# Patient Record
Sex: FEMALE | Race: WHITE | NOT HISPANIC OR LATINO | Employment: OTHER | URBAN - METROPOLITAN AREA
[De-identification: names, ages, dates, MRNs, and addresses within clinical notes are randomized per-mention and may not be internally consistent; named-entity substitution may affect disease eponyms.]

---

## 2018-04-11 ENCOUNTER — PES CALL (OUTPATIENT)
Dept: ADMINISTRATIVE | Facility: CLINIC | Age: 73
End: 2018-04-11

## 2019-06-17 ENCOUNTER — HOSPITAL ENCOUNTER (OUTPATIENT)
Dept: TELEMEDICINE | Facility: HOSPITAL | Age: 74
Discharge: HOME | End: 2019-06-17
Payer: MEDICARE

## 2019-06-17 PROBLEM — F29 PSYCHOSIS: Status: ACTIVE | Noted: 2019-06-17

## 2019-06-17 PROCEDURE — 99203 OFFICE O/P NEW LOW 30 MIN: CPT | Mod: GT,,, | Performed by: PSYCHIATRY & NEUROLOGY

## 2019-06-17 PROCEDURE — 99203 PR OFFICE/OUTPT VISIT, NEW, LEVL III, 30-44 MIN: ICD-10-PCS | Mod: GT,,, | Performed by: PSYCHIATRY & NEUROLOGY
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
## 2019-06-18 PROBLEM — K21.9 GERD (GASTROESOPHAGEAL REFLUX DISEASE): Chronic | Status: ACTIVE | Noted: 2019-06-18

## 2019-06-18 PROBLEM — H10.13 ALLERGIC CONJUNCTIVITIS OF BOTH EYES: Status: ACTIVE | Noted: 2019-06-18

## 2019-06-18 PROBLEM — R73.09 ELEVATED GLUCOSE: Status: ACTIVE | Noted: 2019-06-18

## 2019-08-14 ENCOUNTER — PES CALL (OUTPATIENT)
Dept: ADMINISTRATIVE | Facility: CLINIC | Age: 74
End: 2019-08-14

## 2022-06-09 ENCOUNTER — TELEPHONE (OUTPATIENT)
Dept: UROLOGY | Facility: CLINIC | Age: 77
End: 2022-06-09

## 2022-06-09 ENCOUNTER — OFFICE VISIT (OUTPATIENT)
Dept: UROLOGY | Facility: CLINIC | Age: 77
End: 2022-06-09
Payer: MEDICARE

## 2022-06-09 VITALS
BODY MASS INDEX: 28.05 KG/M2 | HEART RATE: 96 BPM | WEIGHT: 152.44 LBS | DIASTOLIC BLOOD PRESSURE: 65 MMHG | RESPIRATION RATE: 18 BRPM | SYSTOLIC BLOOD PRESSURE: 103 MMHG | HEIGHT: 62 IN

## 2022-06-09 DIAGNOSIS — N39.0 URINARY TRACT INFECTION WITH HEMATURIA, SITE UNSPECIFIED: Primary | ICD-10-CM

## 2022-06-09 DIAGNOSIS — N20.0 LEFT NEPHROLITHIASIS: ICD-10-CM

## 2022-06-09 DIAGNOSIS — R31.9 URINARY TRACT INFECTION WITH HEMATURIA, SITE UNSPECIFIED: Primary | ICD-10-CM

## 2022-06-09 LAB
BILIRUB SERPL-MCNC: ABNORMAL MG/DL
BLOOD URINE, POC: ABNORMAL
CLARITY, POC UA: CLEAR
COLOR, POC UA: YELLOW
GLUCOSE UR QL STRIP: ABNORMAL
KETONES UR QL STRIP: ABNORMAL
LEUKOCYTE ESTERASE URINE, POC: ABNORMAL
NITRITE, POC UA: ABNORMAL
PH, POC UA: 5.5
PROTEIN, POC: ABNORMAL
SPECIFIC GRAVITY, POC UA: 1.02
UROBILINOGEN, POC UA: 0.2

## 2022-06-09 PROCEDURE — 99999 PR PBB SHADOW E&M-EST. PATIENT-LVL IV: CPT | Mod: PBBFAC,,, | Performed by: UROLOGY

## 2022-06-09 PROCEDURE — 3288F PR FALLS RISK ASSESSMENT DOCUMENTED: ICD-10-PCS | Mod: CPTII,S$GLB,, | Performed by: UROLOGY

## 2022-06-09 PROCEDURE — 99204 OFFICE O/P NEW MOD 45 MIN: CPT | Mod: S$GLB,,, | Performed by: UROLOGY

## 2022-06-09 PROCEDURE — 81002 POCT URINE DIPSTICK WITHOUT MICROSCOPE: ICD-10-PCS | Mod: S$GLB,,, | Performed by: UROLOGY

## 2022-06-09 PROCEDURE — 3074F PR MOST RECENT SYSTOLIC BLOOD PRESSURE < 130 MM HG: ICD-10-PCS | Mod: CPTII,S$GLB,, | Performed by: UROLOGY

## 2022-06-09 PROCEDURE — 1126F AMNT PAIN NOTED NONE PRSNT: CPT | Mod: CPTII,S$GLB,, | Performed by: UROLOGY

## 2022-06-09 PROCEDURE — 3078F PR MOST RECENT DIASTOLIC BLOOD PRESSURE < 80 MM HG: ICD-10-PCS | Mod: CPTII,S$GLB,, | Performed by: UROLOGY

## 2022-06-09 PROCEDURE — 1101F PR PT FALLS ASSESS DOC 0-1 FALLS W/OUT INJ PAST YR: ICD-10-PCS | Mod: CPTII,S$GLB,, | Performed by: UROLOGY

## 2022-06-09 PROCEDURE — 3074F SYST BP LT 130 MM HG: CPT | Mod: CPTII,S$GLB,, | Performed by: UROLOGY

## 2022-06-09 PROCEDURE — 3288F FALL RISK ASSESSMENT DOCD: CPT | Mod: CPTII,S$GLB,, | Performed by: UROLOGY

## 2022-06-09 PROCEDURE — 1101F PT FALLS ASSESS-DOCD LE1/YR: CPT | Mod: CPTII,S$GLB,, | Performed by: UROLOGY

## 2022-06-09 PROCEDURE — 1126F PR PAIN SEVERITY QUANTIFIED, NO PAIN PRESENT: ICD-10-PCS | Mod: CPTII,S$GLB,, | Performed by: UROLOGY

## 2022-06-09 PROCEDURE — 3078F DIAST BP <80 MM HG: CPT | Mod: CPTII,S$GLB,, | Performed by: UROLOGY

## 2022-06-09 PROCEDURE — 81002 URINALYSIS NONAUTO W/O SCOPE: CPT | Mod: S$GLB,,, | Performed by: UROLOGY

## 2022-06-09 PROCEDURE — 99204 PR OFFICE/OUTPT VISIT, NEW, LEVL IV, 45-59 MIN: ICD-10-PCS | Mod: S$GLB,,, | Performed by: UROLOGY

## 2022-06-09 PROCEDURE — 1159F PR MEDICATION LIST DOCUMENTED IN MEDICAL RECORD: ICD-10-PCS | Mod: CPTII,S$GLB,, | Performed by: UROLOGY

## 2022-06-09 PROCEDURE — 99999 PR PBB SHADOW E&M-EST. PATIENT-LVL IV: ICD-10-PCS | Mod: PBBFAC,,, | Performed by: UROLOGY

## 2022-06-09 PROCEDURE — 1159F MED LIST DOCD IN RCRD: CPT | Mod: CPTII,S$GLB,, | Performed by: UROLOGY

## 2022-06-09 RX ORDER — ESTRADIOL 0.1 MG/G
1 CREAM VAGINAL DAILY
Qty: 42.5 G | Refills: 3 | Status: SHIPPED | OUTPATIENT
Start: 2022-06-09 | End: 2022-06-09

## 2022-06-09 RX ORDER — ESTRADIOL 0.1 MG/G
1 CREAM VAGINAL DAILY
Qty: 42.5 G | Refills: 3 | Status: SHIPPED | OUTPATIENT
Start: 2022-06-09 | End: 2023-11-20

## 2022-06-09 NOTE — PATIENT INSTRUCTIONS
Need to confirm uti's in future with cath urines. If no uti then eval cause for sx.   LLP 9mm stone enlarging slowly since 2013    1. Urinary tract infection with hematuria, site unspecified    2. Left nephrolithiasis          Plan:     Start estrace cream nightly for 2 weeks then every other night- to help prevent uti.     Start utiva cranberry caplets. Once daily. When she has uti sx twice a day for 2 days. If no improvement with then needs catheterized urien sample     Xray and ultrasound  to see if stone still present and visible, confirm not obstructing-if visible on kub/xray can possibly can shock wave lithotripsy (12cm SSD, 1200 HU). Would likely need stent followed by urs if didn't work.     She will call clinic if she has uti's - needs catheterized urine samples     F/u in 3 months        Hormone cream- why you need it, how to use it  We all have bacteria that cover our bodies, however we want good bacteria, not bad bacteria. When you lack hormones,  your vagina starts to let the bad bacteria take over because there is an imbalance. The hormone cream allows for good bacteria to take over again and this can help decrease the risks of UTIs. It can also help with vaginal dryness.   Please let  know if you have a history of breast cancer, uterine cancer, cervical cancer or a history of blood clots or heart attack.  If it costs more than $70 we can write you for a compounded less expensive form of estrace [facility]. Please let us know. They will mail it to you for around $8 or you can pick it up in [place]. They will call you for payment first and you can ask the address. If you haven't heard from them in 2 days, please call them to confirm they received the prescription.   Do not use the applicator, just your finger. This will make it last longer. Apply small smear to 2nd knuckle. Apply around urethra and into vagina to 2nd knuckle. Prescription should last around 6 months.    Apply inside vagina with finger daily x 2 weeks and then 2 to 3x per week after this indefinitely.  You will only see improvement if you use on a regular basis in about a month. (less dryness, maybe some less overactive bladder, less UTIs possibly if having UTIs).   It is not a medicine to use as needed. Typically a lifelong medication.

## 2022-06-09 NOTE — TELEPHONE ENCOUNTER
Spoke with aliza at [facility] clarified prescription for estrace vaginal cream. Apply pea size amount with knuckle. Verbally voiced understanding.

## 2022-06-09 NOTE — TELEPHONE ENCOUNTER
----- Message from Elaina Edouard MA sent at 6/9/2022 12:49 PM CDT -----  Type: Needs Medical Advice  Who Called:  [facility]  Best Call Back Number: [phone]  Additional Information: pharm needs clarification for medication that was sent over

## 2022-06-09 NOTE — PROGRESS NOTES
[facility] - [place]  Staff: MD Maritza  PCP: Kareem Sosa MD  Date of Service: 06/09/2022      Subjective:     HPI: Amanda Pereira is a 76 y.o. female     Pt saw radha sherrie 12/5/12 for flank pain, oab and possible h/o stones. Urine cultures were negative. and by that point had a CTRSS 11/29/12 showing a 5mm stone on left      She then saw  2/8/13 . He did a cysto urethral dilation. kub showed 8mm stone over L kidney.       She saw Brea rodriguez 10/31/14 for L flank pain with nausea. Voided ua showed leuk and blood.     kub 10/23/14 identifies a stone but not in location c/w seen on ct and previous kub      She saw me 12/10/14 with c/o L flank pain stating pain occurs with activity and decaf coffee, was also having some UUI. pvr by scan:0. ua neg. I ordered a rbus and started her on vesicare and premarin cream. rbus showed 5mm stone in L kd without hydro.     Ct 2018 at [facility] done for diarrhea:      Interval history since last visit with me 6/9/22 :  She thinks she's been having uti's the past few months. Symptoms include dysuria, significant urge incontinence, peach colored cloudiness. Hasn't passed any kidney stones. Hasn't had any left flank pain.  Usually resolves with azo. Denies any GH. Significant dry mouth from anti-depressants?    About a week ago she saw pcp, she was started on abx and finished them yesterday.     When she doesn't have a uti has some oab. Voids 3-4x a day.    Urine history: family history of kidney, bladder or prostate cancer:No, personal or family history of kidney stones: No,tobacco use: No, anticoagulation: No   6/9/22  Tr bld  12/10/14 Neg  10/27/14 2+bili/+wbc/50 bld  10/23/14 diptheroids  11/29/12 Ng, void: >1000 glucose  11/21/12 Tr prot    REVIEW OF SYSTEMS:  Negative except for as stated above    Past Medical History:   Diagnosis Date    Depression     Diabetes mellitus     borderline    Kidney stone        Past Surgical History:    Procedure Laterality Date    TONSILLECTOMY, ADENOIDECTOMY      TUBAL LIGATION      WISDOM TOOTH EXTRACTION              Objective:     Vitals:    06/09/22 1204   BP: 103/65   Pulse: 96   Resp: 18       Focused Gu exam: as above       Assessment:     Amanda Pereira is a 76 y.o. female with     Need to confirm uti's in future with cath urines. If no uti then eval cause for sx.   LLP 9mm stone enlarging slowly since 2013    1. Urinary tract infection with hematuria, site unspecified    2. Left nephrolithiasis          Plan:     Start estrace cream nightly for 2 weeks then every other night- to help prevent uti. [facility] to [facility]     Start utiva cranberry caplets. Once daily. When she has uti sx twice a day for 2 days. If no improvement with then needs catheterized urien sample     Xray and ultrasound  to see if stone still present and visible, confirm not obstructing-if visible on kub/xray can possibly can shock wave lithotripsy (12cm SSD, 1200 HU). Would likely need stent followed by urs if didn't work.     She will call clinic if she has uti's - needs catheterized urine samples     F/u in 3 months          Pepper Salas MD

## 2022-06-10 ENCOUNTER — PATIENT MESSAGE (OUTPATIENT)
Dept: UROLOGY | Facility: CLINIC | Age: 77
End: 2022-06-10
Payer: MEDICARE

## 2022-06-13 ENCOUNTER — PATIENT MESSAGE (OUTPATIENT)
Dept: UROLOGY | Facility: CLINIC | Age: 77
End: 2022-06-13
Payer: MEDICARE

## 2022-06-14 ENCOUNTER — PATIENT MESSAGE (OUTPATIENT)
Dept: UROLOGY | Facility: CLINIC | Age: 77
End: 2022-06-14
Payer: MEDICARE

## 2022-06-27 ENCOUNTER — HOSPITAL ENCOUNTER (OUTPATIENT)
Dept: RADIOLOGY | Facility: HOSPITAL | Age: 77
Discharge: HOME | End: 2022-06-27
Attending: UROLOGY
Payer: MEDICARE

## 2022-06-27 DIAGNOSIS — N20.0 LEFT NEPHROLITHIASIS: ICD-10-CM

## 2022-06-27 DIAGNOSIS — R31.9 URINARY TRACT INFECTION WITH HEMATURIA, SITE UNSPECIFIED: ICD-10-CM

## 2022-06-27 DIAGNOSIS — N39.0 URINARY TRACT INFECTION WITH HEMATURIA, SITE UNSPECIFIED: ICD-10-CM

## 2022-06-27 PROCEDURE — 74018 RADEX ABDOMEN 1 VIEW: CPT | Mod: 26,,, | Performed by: RADIOLOGY

## 2022-06-27 PROCEDURE — 74018 RADEX ABDOMEN 1 VIEW: CPT | Mod: TC,FY

## 2022-06-27 PROCEDURE — 76770 US EXAM ABDO BACK WALL COMP: CPT | Mod: 26,,, | Performed by: RADIOLOGY

## 2022-06-27 PROCEDURE — 76770 US EXAM ABDO BACK WALL COMP: CPT | Mod: TC

## 2022-06-27 PROCEDURE — 74018 XR KUB: ICD-10-PCS | Mod: 26,,, | Performed by: RADIOLOGY

## 2022-06-27 PROCEDURE — 76770 US RETROPERITONEAL COMPLETE: ICD-10-PCS | Mod: 26,,, | Performed by: RADIOLOGY

## 2022-06-27 NOTE — PROGRESS NOTES
Let her know xray shows stone  It's not obstructing  It should not be causing pain  We can talk about treating it since we can see it on xray  She can f/u in 3 months as scheduled or f/u sooner if she wants to discuss shock wave treatment for stone

## 2022-08-22 ENCOUNTER — OFFICE VISIT (OUTPATIENT)
Dept: UROLOGY | Facility: CLINIC | Age: 77
End: 2022-08-22
Payer: MEDICARE

## 2022-08-22 VITALS
WEIGHT: 143.88 LBS | BODY MASS INDEX: 25.49 KG/M2 | HEIGHT: 63 IN | HEART RATE: 80 BPM | DIASTOLIC BLOOD PRESSURE: 61 MMHG | SYSTOLIC BLOOD PRESSURE: 105 MMHG

## 2022-08-22 DIAGNOSIS — N39.41 URGE INCONTINENCE: ICD-10-CM

## 2022-08-22 DIAGNOSIS — N32.81 OAB (OVERACTIVE BLADDER): ICD-10-CM

## 2022-08-22 DIAGNOSIS — N20.0 LEFT NEPHROLITHIASIS: Primary | ICD-10-CM

## 2022-08-22 LAB
BILIRUB SERPL-MCNC: ABNORMAL MG/DL
BLOOD URINE, POC: NEGATIVE
CLARITY, POC UA: CLEAR
COLOR, POC UA: YELLOW
GLUCOSE UR QL STRIP: NEGATIVE
KETONES UR QL STRIP: NEGATIVE
LEUKOCYTE ESTERASE URINE, POC: ABNORMAL
NITRITE, POC UA: NEGATIVE
PH, POC UA: 5.5
PROTEIN, POC: NEGATIVE
SPECIFIC GRAVITY, POC UA: 1.02
UROBILINOGEN, POC UA: 0.2

## 2022-08-22 PROCEDURE — 99999 PR PBB SHADOW E&M-EST. PATIENT-LVL V: CPT | Mod: PBBFAC,,, | Performed by: UROLOGY

## 2022-08-22 PROCEDURE — 81002 POCT URINE DIPSTICK WITHOUT MICROSCOPE: ICD-10-PCS | Mod: S$GLB,,, | Performed by: UROLOGY

## 2022-08-22 PROCEDURE — 99214 OFFICE O/P EST MOD 30 MIN: CPT | Mod: S$GLB,,, | Performed by: UROLOGY

## 2022-08-22 PROCEDURE — 3074F PR MOST RECENT SYSTOLIC BLOOD PRESSURE < 130 MM HG: ICD-10-PCS | Mod: CPTII,S$GLB,, | Performed by: UROLOGY

## 2022-08-22 PROCEDURE — 3078F PR MOST RECENT DIASTOLIC BLOOD PRESSURE < 80 MM HG: ICD-10-PCS | Mod: CPTII,S$GLB,, | Performed by: UROLOGY

## 2022-08-22 PROCEDURE — 1160F RVW MEDS BY RX/DR IN RCRD: CPT | Mod: CPTII,S$GLB,, | Performed by: UROLOGY

## 2022-08-22 PROCEDURE — 1101F PR PT FALLS ASSESS DOC 0-1 FALLS W/OUT INJ PAST YR: ICD-10-PCS | Mod: CPTII,S$GLB,, | Performed by: UROLOGY

## 2022-08-22 PROCEDURE — 1160F PR REVIEW ALL MEDS BY PRESCRIBER/CLIN PHARMACIST DOCUMENTED: ICD-10-PCS | Mod: CPTII,S$GLB,, | Performed by: UROLOGY

## 2022-08-22 PROCEDURE — 99214 PR OFFICE/OUTPT VISIT, EST, LEVL IV, 30-39 MIN: ICD-10-PCS | Mod: S$GLB,,, | Performed by: UROLOGY

## 2022-08-22 PROCEDURE — 3074F SYST BP LT 130 MM HG: CPT | Mod: CPTII,S$GLB,, | Performed by: UROLOGY

## 2022-08-22 PROCEDURE — 3288F FALL RISK ASSESSMENT DOCD: CPT | Mod: CPTII,S$GLB,, | Performed by: UROLOGY

## 2022-08-22 PROCEDURE — 3288F PR FALLS RISK ASSESSMENT DOCUMENTED: ICD-10-PCS | Mod: CPTII,S$GLB,, | Performed by: UROLOGY

## 2022-08-22 PROCEDURE — 3078F DIAST BP <80 MM HG: CPT | Mod: CPTII,S$GLB,, | Performed by: UROLOGY

## 2022-08-22 PROCEDURE — 1159F PR MEDICATION LIST DOCUMENTED IN MEDICAL RECORD: ICD-10-PCS | Mod: CPTII,S$GLB,, | Performed by: UROLOGY

## 2022-08-22 PROCEDURE — 99999 PR PBB SHADOW E&M-EST. PATIENT-LVL V: ICD-10-PCS | Mod: PBBFAC,,, | Performed by: UROLOGY

## 2022-08-22 PROCEDURE — 1101F PT FALLS ASSESS-DOCD LE1/YR: CPT | Mod: CPTII,S$GLB,, | Performed by: UROLOGY

## 2022-08-22 PROCEDURE — 1126F AMNT PAIN NOTED NONE PRSNT: CPT | Mod: CPTII,S$GLB,, | Performed by: UROLOGY

## 2022-08-22 PROCEDURE — 1159F MED LIST DOCD IN RCRD: CPT | Mod: CPTII,S$GLB,, | Performed by: UROLOGY

## 2022-08-22 PROCEDURE — 81002 URINALYSIS NONAUTO W/O SCOPE: CPT | Mod: S$GLB,,, | Performed by: UROLOGY

## 2022-08-22 PROCEDURE — 1126F PR PAIN SEVERITY QUANTIFIED, NO PAIN PRESENT: ICD-10-PCS | Mod: CPTII,S$GLB,, | Performed by: UROLOGY

## 2022-08-22 RX ORDER — OXYBUTYNIN CHLORIDE 10 MG/1
10 TABLET, EXTENDED RELEASE ORAL DAILY
Qty: 90 TABLET | Refills: 3 | Status: ON HOLD | OUTPATIENT
Start: 2022-08-22 | End: 2022-09-21 | Stop reason: HOSPADM

## 2022-08-22 RX ORDER — LABETALOL 200 MG/1
200 TABLET, FILM COATED ORAL NIGHTLY
Status: ON HOLD | COMMUNITY
Start: 2022-05-18 | End: 2023-11-22 | Stop reason: HOSPADM

## 2022-08-22 RX ORDER — ESCITALOPRAM OXALATE 20 MG/1
20 TABLET ORAL EVERY MORNING
COMMUNITY
Start: 2022-04-25

## 2022-08-22 RX ORDER — DOXEPIN HYDROCHLORIDE 25 MG/1
CAPSULE ORAL
Status: ON HOLD | COMMUNITY
Start: 2022-05-18 | End: 2022-09-21 | Stop reason: ALTCHOICE

## 2022-08-22 RX ORDER — LAMOTRIGINE 150 MG/1
150 TABLET ORAL 2 TIMES DAILY
COMMUNITY
Start: 2022-05-09

## 2022-08-22 RX ORDER — CIPROFLOXACIN 2 MG/ML
400 INJECTION, SOLUTION INTRAVENOUS
Status: CANCELLED | OUTPATIENT
Start: 2022-08-22

## 2022-08-22 NOTE — PATIENT INSTRUCTIONS
Assessment:     Amanda Pereira is a 76 y.o. female with     Need to confirm uti's in future with cath urines. If no uti then eval cause for sx.      LLP 9mm stone enlarging slowly since 2013. Visible on kub. No ct since 2018 but bw 2014 and 2018 did not grow any new stones. Wants to try to have a procedure can try eswl. Stent and urs if it doesn't break up.    oab- try ditropan      1. Left nephrolithiasis    2. OAB (overactive bladder)    3. Urge incontinence          Plan:       Short plan:  Continue estrace cream every other night at minimum 2x a week to help prevent uti's.  Start ditropan/oxybutynin 10mg XL once in the morning to see if it helps with frequency and urine leakage. If it causes too much dry mouth let us know and we can try another medicine. But the other medicines will take longer to work.  Schedule for ESWL/shock wave of LLP stone on Wednesday sept 21st.   preop visit 7d prior with labs, cxr, ekg.   Hold any asa products x 7d. kub morning of procedure.   Catheterized urine 10d prior for ua and culture.       Extracorporeal shock wave lithotripsy is planned for left lower pole 9mm STONE on 9/21:  The stone is radioopaque.   The stone is located on the left at the level of L3 and measures 10 mm.   CT showed the stone was 1200 [place] units and 12 cm skin to stone distance.     The patient does not have more stones in right kidney. (but last ct in 2018 neg. rbus recently neg.   The patient was counseled that if the stone does not appear to break up that I can place a stent and return for stone extraction 2-3 weeks later.   The patient was counseled to stop any blood thinners including Goody's Powder, BC Powder 1 week prior the procedure.     The patient is scheduled for ESWL. The risks and benefits of extracorporeal shock wave lithotripsy were discussed with the patient in detail.  Consent was obtained.  The risks include but are not limited to bleeding in or around the kidney, infection, pain,  incomplete fragmentation of the stone requiring a repeat treatment or a different form of treatment, obstruction of the kidney by stone particles possibly requiring a ureteral stent or nephrostomy tube, injury to or loss of the kidney ,injury to the ureter or bladder, need for further procedures, hypertension (transient or permanent), recurrence of stones, and need for open surgery.  Alternative treatments were also discussed with the patient in detail to include ureteroscopy, percutaneous treatment of the stone, open surgery  and observation. Patient understands these risks and has agreed to proceed with surgery.

## 2022-08-22 NOTE — PROGRESS NOTES
[facility] - [place]  Staff: MD Maritza  PCP: Kareem Sosa MD  Date of Service: 08/22/2022      Subjective:     HPI: Amanda Pereira is a 76 y.o. female     Pt saw radha sherrie 12/5/12 for flank pain, oab and possible h/o stones. Urine cultures were negative. and by that point had a CTRSS 11/29/12 showing a 5mm stone on left      She then saw  2/8/13 . He did a cysto urethral dilation. kub showed 8mm stone over L kidney.       She saw Brea rodriguez 10/31/14 for L flank pain with nausea. Voided ua showed leuk and blood.     kub 10/23/14 identifies a stone but not in location c/w seen on ct and previous kub      12/10/14 interval history by me: She saw me 12/10/14 with c/o L flank pain stating pain occurs with activity and decaf coffee, was also having some UUI. Pt states that she had left flank pain for the past few years ever since her kidney stone was first identified. She was going through a lot with her  going through cancer treatment and eventually passing away. She says she has pain whenever she drinks decaf coffee, does housework and describes the pain as a throbbing pain. Her known stone is a left nonobstructing stone.      She also describes urgency and urge incontinence occassionally. Last week she wet all her clothes because she was unable to control it. She's been having trouble controlling her urine for the past few weeks. She also describes leaking urine every time she coughs. She does not wear any pads. She does not have to change her underwear every day. Her UUI bothers her the most.  She also describes difficulty voiding in the morning. Stream improves throughout the day.    pvr by scan:0. ua neg.   I ordered a rbus and started her on vesicare and premarin cream. rbus showed 5mm stone in L kd without hydro.     Ct 2018 at [facility] done for diarrhea:LLP stone only. No new stones.         Interval history 6/9/22 :  She thinks she's been having uti's the  past few months. Symptoms include dysuria, significant urge incontinence, peach colored cloudiness. Hasn't passed any kidney stones. Hasn't had any left flank pain.  Usually resolves with azo. Denies any GH. Significant dry mouth from anti-depressants?    About a week ago she saw pcp, she was started on abx and finished them yesterday.     When she doesn't have a uti has some oab. Voids 3-4x a day. ua today with tr bld.     Interval history 8/22/22:  At last visit recommended starting estrace cream and utiva to help prevent uti/uti sx. She's using hormone cream every other night but not utiva. hasn not had a uti in the last 2.5 months.      Also ordered a kub which showed stone in LLP. No other obvious stones. rbus showed no hydro. No mention of stones bc bowel obscuring LLQ. . ctrss previously showed:  (12cm SSD, 1200 HU).     She also says she is urinating every 1-2 hours (day and night). Wearing 2-3 pads/day- wet when she changes them.      ua void: small leuk but cath urine with none.         Urine history: family history of kidney, bladder or prostate cancer:No, personal or family history of kidney stones: No,tobacco use: No, anticoagulation: No. No cardiologist. NEEDS CATHETERIZED URINES.   8/22/22 Small leuk, cath: tr bld, pvr by io:   6/9/22  Tr bld   12/10/14 Neg  10/27/14 2+bili/+wbc/50 bld  10/23/14 diptheroids  11/29/12 Ng, void: >1000 glucose  11/21/12 Tr prot    REVIEW OF SYSTEMS:  Negative except for as stated above    Past Medical History:   Diagnosis Date    Depression     Diabetes mellitus     borderline    Kidney stone        Past Surgical History:   Procedure Laterality Date    TONSILLECTOMY, ADENOIDECTOMY      TUBAL LIGATION      WISDOM TOOTH EXTRACTION              Objective:     Vitals:    08/22/22 1156   BP: 105/61   Pulse: 80       Focused Gu exam: as above       Assessment:     Amanda Pereira is a 76 y.o. female with     Need to confirm uti's in future with cath urines. If no uti then  eval cause for sx.      LLP 9mm stone enlarging slowly since 2013. Visible on kub. No ct since 2018 but bw 2014 and 2018 did not grow any new stones. Wants to try to have a procedure can try eswl. Stent and urs if it doesn't break up.    oab- try ditropan      1. Left nephrolithiasis    2. OAB (overactive bladder)    3. Urge incontinence          Plan:       Short plan:  · Continue estrace cream every other night at minimum 2x a week to help prevent uti's.  · Start ditropan/oxybutynin 10mg XL once in the morning to see if it helps with frequency and urine leakage. If it causes too much dry mouth let us know and we can try another medicine. But the other medicines will take longer to work.  · Schedule for ESWL/shock wave of LLP stone on Wednesday sept 21st.   · preop visit 7d prior with labs, cxr, ekg.   · Hold any asa products x 7d. kub morning of procedure.   · Catheterized urine 10d prior for ua and culture.       Extracorporeal shock wave lithotripsy is planned for left lower pole 9mm STONE on 9/21:  The stone is radioopaque.   The stone is located on the left at the level of L3 and measures 10 mm.   CT showed the stone was 1200 [place] units and 12 cm skin to stone distance.     The patient does not have more stones in right kidney. (but last ct in 2018 neg. rbus recently neg.   The patient was counseled that if the stone does not appear to break up that I can place a stent and return for stone extraction 2-3 weeks later.   The patient was counseled to stop any blood thinners including Goody's Powder, BC Powder 1 week prior the procedure.     The patient is scheduled for ESWL. The risks and benefits of extracorporeal shock wave lithotripsy were discussed with the patient in detail.  Consent was obtained.  The risks include but are not limited to bleeding in or around the kidney, infection, pain, incomplete fragmentation of the stone requiring a repeat treatment or a different form of treatment, obstruction  of the kidney by stone particles possibly requiring a ureteral stent or nephrostomy tube, injury to or loss of the kidney ,injury to the ureter or bladder, need for further procedures, hypertension (transient or permanent), recurrence of stones, and need for open surgery.  Alternative treatments were also discussed with the patient in detail to include ureteroscopy, percutaneous treatment of the stone, open surgery  and observation. Patient understands these risks and has agreed to proceed with surgery.          Pepper Salas MD

## 2022-09-08 ENCOUNTER — TELEPHONE (OUTPATIENT)
Dept: UROLOGY | Facility: CLINIC | Age: 77
End: 2022-09-08
Payer: MEDICARE

## 2022-09-13 ENCOUNTER — CLINICAL SUPPORT (OUTPATIENT)
Dept: UROLOGY | Facility: CLINIC | Age: 77
End: 2022-09-13
Payer: MEDICARE

## 2022-09-13 ENCOUNTER — HOSPITAL ENCOUNTER (OUTPATIENT)
Dept: RADIOLOGY | Facility: HOSPITAL | Age: 77
Discharge: HOME | End: 2022-09-13
Attending: UROLOGY
Payer: MEDICARE

## 2022-09-13 ENCOUNTER — HOSPITAL ENCOUNTER (OUTPATIENT)
Dept: PREADMISSION TESTING | Facility: HOSPITAL | Age: 77
Discharge: HOME | End: 2022-09-13
Attending: UROLOGY
Payer: MEDICARE

## 2022-09-13 DIAGNOSIS — Z01.818 PRE-OP EXAM: ICD-10-CM

## 2022-09-13 DIAGNOSIS — R82.998 CELLS AND CASTS IN URINE: Primary | ICD-10-CM

## 2022-09-13 DIAGNOSIS — N20.0 LEFT NEPHROLITHIASIS: ICD-10-CM

## 2022-09-13 LAB
BILIRUB UR QL STRIP: NEGATIVE
CLARITY UR: CLEAR
COLOR UR: YELLOW
GLUCOSE UR QL STRIP: NEGATIVE
HGB UR QL STRIP: ABNORMAL
KETONES UR QL STRIP: NEGATIVE
LEUKOCYTE ESTERASE UR QL STRIP: NEGATIVE
NITRITE UR QL STRIP: NEGATIVE
PH UR STRIP: 7 [PH] (ref 5–8)
PROT UR QL STRIP: NEGATIVE
SP GR UR STRIP: <=1.005 (ref 1–1.03)
URN SPEC COLLECT METH UR: ABNORMAL
UROBILINOGEN UR STRIP-ACNC: NEGATIVE EU/DL

## 2022-09-13 PROCEDURE — 99499 NO LOS: ICD-10-PCS | Mod: S$GLB,,, | Performed by: UROLOGY

## 2022-09-13 PROCEDURE — 87086 URINE CULTURE/COLONY COUNT: CPT | Performed by: UROLOGY

## 2022-09-13 PROCEDURE — 71045 XR CHEST AP PORTABLE: ICD-10-PCS | Mod: 26,,, | Performed by: RADIOLOGY

## 2022-09-13 PROCEDURE — 71045 X-RAY EXAM CHEST 1 VIEW: CPT | Mod: 26,,, | Performed by: RADIOLOGY

## 2022-09-13 PROCEDURE — 71045 X-RAY EXAM CHEST 1 VIEW: CPT | Mod: TC,FY

## 2022-09-13 PROCEDURE — 81003 URINALYSIS AUTO W/O SCOPE: CPT | Performed by: UROLOGY

## 2022-09-13 PROCEDURE — 99499 UNLISTED E&M SERVICE: CPT | Mod: S$GLB,,, | Performed by: UROLOGY

## 2022-09-13 PROCEDURE — 51701 PR INSERTION OF NON-INDWELLING BLADDER CATHETERIZATION FOR RESIDUAL UR: ICD-10-PCS | Mod: S$GLB,,, | Performed by: UROLOGY

## 2022-09-13 PROCEDURE — 51701 INSERT BLADDER CATHETER: CPT | Mod: S$GLB,,, | Performed by: UROLOGY

## 2022-09-13 PROCEDURE — 93005 ELECTROCARDIOGRAM TRACING: CPT

## 2022-09-13 PROCEDURE — 93010 EKG 12-LEAD: ICD-10-PCS | Mod: ,,, | Performed by: INTERNAL MEDICINE

## 2022-09-13 PROCEDURE — 93010 ELECTROCARDIOGRAM REPORT: CPT | Mod: ,,, | Performed by: INTERNAL MEDICINE

## 2022-09-13 RX ORDER — MAG HYDROX/ALUMINUM HYD/SIMETH 200-200-20
30 SUSPENSION, ORAL (FINAL DOSE FORM) ORAL
COMMUNITY
End: 2023-11-20

## 2022-09-13 NOTE — DISCHARGE INSTRUCTIONS
To confirm, Your doctor has instructed you that surgery is scheduled for: 9/21/22 WITH DR. WAYNE    Please report to [facility], [facility] the morning of surgery. You must check-in and receive a wristband before going to your procedure.    Pre-Op will call the afternoon prior to surgery between 1:00 and 6:00 PM with the final arrival time.  Phone number: [phone]    PLEASE NOTE:  The surgery schedule has many variables which may affect the time of your surgery case.  Family members should be available if your surgery time changes.  Plan to be here the day of your procedure between 4-6 hours.    MEDICATIONS:  TAKE ONLY THESE MEDICATIONS WITH A SMALL SIP OF WATER THE MORNING OF YOUR PROCEDURE:  LEXAPRO, LAMICTAL, OXYBUTYNIN    DO NOT TAKE THESE MEDICATIONS 5-7 DAYS PRIOR to your procedure or per your surgeon's request:   ASPIRIN, ALEVE, ADVIL, IBUPROFEN, FISH OIL VITAMIN E, HERBALS  (May take Tylenol)    ONLY if you are prescribed any types of blood thinners such as:  Aspirin, Coumadin, Plavix, Pradaxa, Xarelto, Aggrenox, Effient, Eliquis, Savasya, Brilinta, or any other, ask your surgeon whether you should stop taking them and how long before surgery you should stop.  You may also need to verify with the prescribing physician if it is ok to stop your medication.      INSTRUCTIONS IMPORTANT!!  Do not eat or drink anything between midnight and the time of your procedure- this includes gum, mints, and candy.  Do not smoke or drink alcoholic beverages 24 hours prior to your procedure.  Shower the night before AND the morning of your procedure with a Chlorhexidine wash such as Hibiclens or Dial antibacterial soap from the neck down.  Do not get it on your face or in your eyes.  You may use your own shampoo and face wash. This helps your skin to be as bacteria free as possible.    If you wear contact lenses, dentures, hearing aids or glasses, bring a container to put them in during  surgery and give to a family member for safe keeping.  Please leave all jewelry, piercing's and valuables at home.   DO NOT remove hair from the surgery site.  Do not shave the incision site unless you are given specific instructions to do so.    ONLY if you have been diagnosed with sleep apnea please bring your C-PAP machine.  ONLY if you wear home oxygen please bring your portable oxygen tank the day of your procedure.  ONLY if you have a history of OPEN HEART SURGERY you will need a clearance from your Cardiologist per Anesthesia.      ONLY for patients requiring bowel prep, written instructions will be given by your doctor's office.  ONLY if you have a neuro stimulator, please bring the controller with you the morning of surgery  ONLY if a type and screen test is needed before surgery, please return:  If your doctor has scheduled you for an overnight stay, bring a small overnight bag with any personal items you need.  Make arrangements in advance for transportation home by a responsible adult.  It is not safe to drive a vehicle during the 24 hours after anesthesia.       [facility] Visitor Policy  Effective July 25, 2022    [facility] will make masks available at entrances and will enforce mask wearing in all common and patient  care areas for employees, patients, and visitors. Masks must be worn properly, ensuring that the nose and mouth  are covered. Children under 2 years of age are excluded from this requirement.    [facility] will continue routine visitation for COVID-19 negative patients, including inpatients, outpatients, and  procedural areas. Visitors will be asked to leave if they exhibit symptoms of respiratory infection, have tested  positive for COVID -19 in the last ten days, have a pending COVID-19 test for symptoms, are unable or refuse  to wear a mask OR do not comply with current policy    All [facility] facilities and properties are tobacco free.  Smoking is NOT allowed.   If you have any  questions about these instructions, call Pre-Op Admit  Nursing at [phone] or the Pre-Op Day Surgery Unit at [phone].

## 2022-09-13 NOTE — PROGRESS NOTES
The patient is here today for a catheterized urine sample.   The patient does not have UTI symptoms (burning with urination, frequency, urgency)    The patient was prepped and a sterile in and out catheterization was performed using a 10 fr catheter.   The bladder was completely drained with the catheter.   40 cc drained when catheter was placed.   Was the patient asked to empty their bladder prior to obtaining a catheterized specimen? no     The urine was sent for: u/a and urine culture    Plan copied from last visit or results prompting today's cath urine (date of note: 8/22/2022):    Short plan:  Continue estrace cream every other night at minimum 2x a week to help prevent uti's.  Start ditropan/oxybutynin 10mg XL once in the morning to see if it helps with frequency and urine leakage. If it causes too much dry mouth let us know and we can try another medicine. But the other medicines will take longer to work.  Schedule for ESWL/shock wave of LLP stone on Wednesday sept 21st.   preop visit 7d prior with labs, cxr, ekg.   Hold any asa products x 7d. kub morning of procedure.   Catheterized urine 10d prior for ua and culture.

## 2022-09-15 LAB — BACTERIA UR CULT: NO GROWTH

## 2022-09-20 ENCOUNTER — ANESTHESIA EVENT (OUTPATIENT)
Dept: SURGERY | Facility: HOSPITAL | Age: 77
End: 2022-09-20
Payer: MEDICARE

## 2022-09-21 ENCOUNTER — TELEPHONE (OUTPATIENT)
Dept: UROLOGY | Facility: CLINIC | Age: 77
End: 2022-09-21
Payer: MEDICARE

## 2022-09-21 ENCOUNTER — ANESTHESIA (OUTPATIENT)
Dept: SURGERY | Facility: HOSPITAL | Age: 77
End: 2022-09-21
Payer: MEDICARE

## 2022-09-21 ENCOUNTER — HOSPITAL ENCOUNTER (OUTPATIENT)
Facility: HOSPITAL | Age: 77
Discharge: HOME | End: 2022-09-21
Attending: UROLOGY | Admitting: UROLOGY
Payer: MEDICARE

## 2022-09-21 VITALS
SYSTOLIC BLOOD PRESSURE: 125 MMHG | WEIGHT: 150 LBS | TEMPERATURE: 98 F | DIASTOLIC BLOOD PRESSURE: 61 MMHG | OXYGEN SATURATION: 95 % | HEIGHT: 63 IN | BODY MASS INDEX: 26.58 KG/M2 | RESPIRATION RATE: 18 BRPM | HEART RATE: 54 BPM

## 2022-09-21 DIAGNOSIS — N20.0 LEFT NEPHROLITHIASIS: ICD-10-CM

## 2022-09-21 LAB
CTP QC/QA: YES
SARS-COV-2 AG RESP QL IA.RAPID: NEGATIVE

## 2022-09-21 PROCEDURE — 74420 UROGRAPHY RTRGR +-KUB: CPT | Mod: 26,,, | Performed by: UROLOGY

## 2022-09-21 PROCEDURE — C1758 CATHETER, URETERAL: HCPCS | Performed by: UROLOGY

## 2022-09-21 PROCEDURE — C1769 GUIDE WIRE: HCPCS | Performed by: UROLOGY

## 2022-09-21 PROCEDURE — 63600175 PHARM REV CODE 636 W HCPCS: Performed by: UROLOGY

## 2022-09-21 PROCEDURE — 71000015 HC POSTOP RECOV 1ST HR: Performed by: UROLOGY

## 2022-09-21 PROCEDURE — 25500020 PHARM REV CODE 255: Performed by: UROLOGY

## 2022-09-21 PROCEDURE — 71000033 HC RECOVERY, INTIAL HOUR: Performed by: UROLOGY

## 2022-09-21 PROCEDURE — D9220A PRA ANESTHESIA: ICD-10-PCS | Mod: CRNA,,, | Performed by: NURSE ANESTHETIST, CERTIFIED REGISTERED

## 2022-09-21 PROCEDURE — 63600175 PHARM REV CODE 636 W HCPCS: Performed by: NURSE ANESTHETIST, CERTIFIED REGISTERED

## 2022-09-21 PROCEDURE — 27200651 HC AIRWAY, LMA: Performed by: ANESTHESIOLOGY

## 2022-09-21 PROCEDURE — D9220A PRA ANESTHESIA: Mod: ANES,,, | Performed by: ANESTHESIOLOGY

## 2022-09-21 PROCEDURE — 36000706: Performed by: UROLOGY

## 2022-09-21 PROCEDURE — D9220A PRA ANESTHESIA: Mod: CRNA,,, | Performed by: NURSE ANESTHETIST, CERTIFIED REGISTERED

## 2022-09-21 PROCEDURE — 25000003 PHARM REV CODE 250: Performed by: NURSE ANESTHETIST, CERTIFIED REGISTERED

## 2022-09-21 PROCEDURE — 37000008 HC ANESTHESIA 1ST 15 MINUTES: Performed by: UROLOGY

## 2022-09-21 PROCEDURE — 25000003 PHARM REV CODE 250: Performed by: ANESTHESIOLOGY

## 2022-09-21 PROCEDURE — 99900103 DSU ONLY-NO CHARGE-INITIAL HR (STAT): Performed by: UROLOGY

## 2022-09-21 PROCEDURE — 74420 PR  X-RAY RETROGRADE PYELOGRAM: ICD-10-PCS | Mod: 26,,, | Performed by: UROLOGY

## 2022-09-21 PROCEDURE — 94640 AIRWAY INHALATION TREATMENT: CPT

## 2022-09-21 PROCEDURE — 36000707: Performed by: UROLOGY

## 2022-09-21 PROCEDURE — 50590 PR FRAGMENT KIDNEY STONE/ ESWL: ICD-10-PCS | Mod: LT,,, | Performed by: UROLOGY

## 2022-09-21 PROCEDURE — 25000242 PHARM REV CODE 250 ALT 637 W/ HCPCS: Performed by: ANESTHESIOLOGY

## 2022-09-21 PROCEDURE — 37000009 HC ANESTHESIA EA ADD 15 MINS: Performed by: UROLOGY

## 2022-09-21 PROCEDURE — D9220A PRA ANESTHESIA: ICD-10-PCS | Mod: ANES,,, | Performed by: ANESTHESIOLOGY

## 2022-09-21 PROCEDURE — 99900104 DSU ONLY-NO CHARGE-EA ADD'L HR (STAT): Performed by: UROLOGY

## 2022-09-21 PROCEDURE — 50590 FRAGMENTING OF KIDNEY STONE: CPT | Mod: LT,,, | Performed by: UROLOGY

## 2022-09-21 RX ORDER — TRAMADOL HYDROCHLORIDE 50 MG/1
50 TABLET ORAL EVERY 6 HOURS PRN
Qty: 10 TABLET | Refills: 0 | Status: ON HOLD | OUTPATIENT
Start: 2022-09-21 | End: 2023-11-22 | Stop reason: HOSPADM

## 2022-09-21 RX ORDER — LIDOCAINE HYDROCHLORIDE 10 MG/ML
1 INJECTION, SOLUTION EPIDURAL; INFILTRATION; INTRACAUDAL; PERINEURAL ONCE
Status: DISCONTINUED | OUTPATIENT
Start: 2022-09-21 | End: 2022-09-21 | Stop reason: HOSPADM

## 2022-09-21 RX ORDER — LEVALBUTEROL 1.25 MG/.5ML
1.25 SOLUTION, CONCENTRATE RESPIRATORY (INHALATION) ONCE
Status: COMPLETED | OUTPATIENT
Start: 2022-09-21 | End: 2022-09-21

## 2022-09-21 RX ORDER — LIDOCAINE HCL/PF 100 MG/5ML
SYRINGE (ML) INTRAVENOUS
Status: DISCONTINUED | OUTPATIENT
Start: 2022-09-21 | End: 2022-09-21

## 2022-09-21 RX ORDER — ACETAMINOPHEN 10 MG/ML
INJECTION, SOLUTION INTRAVENOUS
Status: DISCONTINUED | OUTPATIENT
Start: 2022-09-21 | End: 2022-09-21

## 2022-09-21 RX ORDER — ONDANSETRON HYDROCHLORIDE 2 MG/ML
INJECTION, SOLUTION INTRAMUSCULAR; INTRAVENOUS
Status: DISCONTINUED | OUTPATIENT
Start: 2022-09-21 | End: 2022-09-21

## 2022-09-21 RX ORDER — LEVALBUTEROL 1.25 MG/.5ML
1.25 SOLUTION, CONCENTRATE RESPIRATORY (INHALATION) ONCE
Status: DISCONTINUED | OUTPATIENT
Start: 2022-09-21 | End: 2022-09-21

## 2022-09-21 RX ORDER — ONDANSETRON 2 MG/ML
4 INJECTION INTRAMUSCULAR; INTRAVENOUS ONCE AS NEEDED
Status: DISCONTINUED | OUTPATIENT
Start: 2022-09-21 | End: 2022-09-21 | Stop reason: HOSPADM

## 2022-09-21 RX ORDER — DEXAMETHASONE SODIUM PHOSPHATE 4 MG/ML
INJECTION, SOLUTION INTRA-ARTICULAR; INTRALESIONAL; INTRAMUSCULAR; INTRAVENOUS; SOFT TISSUE
Status: DISCONTINUED | OUTPATIENT
Start: 2022-09-21 | End: 2022-09-21

## 2022-09-21 RX ORDER — MIRABEGRON 50 MG/1
50 TABLET, FILM COATED, EXTENDED RELEASE ORAL EVERY MORNING
Qty: 90 TABLET | Refills: 3 | Status: ON HOLD | OUTPATIENT
Start: 2022-09-21 | End: 2023-11-22 | Stop reason: HOSPADM

## 2022-09-21 RX ORDER — PHENYLEPHRINE HYDROCHLORIDE 10 MG/ML
INJECTION INTRAVENOUS
Status: DISCONTINUED | OUTPATIENT
Start: 2022-09-21 | End: 2022-09-21

## 2022-09-21 RX ORDER — OXYCODONE HYDROCHLORIDE 5 MG/1
5 TABLET ORAL ONCE AS NEEDED
Status: DISCONTINUED | OUTPATIENT
Start: 2022-09-21 | End: 2022-09-21 | Stop reason: HOSPADM

## 2022-09-21 RX ORDER — FENTANYL CITRATE 50 UG/ML
INJECTION, SOLUTION INTRAMUSCULAR; INTRAVENOUS
Status: DISCONTINUED | OUTPATIENT
Start: 2022-09-21 | End: 2022-09-21

## 2022-09-21 RX ORDER — FENTANYL CITRATE 50 UG/ML
25 INJECTION, SOLUTION INTRAMUSCULAR; INTRAVENOUS EVERY 5 MIN PRN
Status: DISCONTINUED | OUTPATIENT
Start: 2022-09-21 | End: 2022-09-21 | Stop reason: HOSPADM

## 2022-09-21 RX ORDER — DOXYCYCLINE HYCLATE 100 MG
100 TABLET ORAL 2 TIMES DAILY
Qty: 6 TABLET | Refills: 0 | Status: SHIPPED | OUTPATIENT
Start: 2022-09-21 | End: 2022-09-24

## 2022-09-21 RX ORDER — TAMSULOSIN HYDROCHLORIDE 0.4 MG/1
0.4 CAPSULE ORAL NIGHTLY
Qty: 30 CAPSULE | Refills: 0 | Status: ON HOLD | OUTPATIENT
Start: 2022-09-21 | End: 2023-11-22 | Stop reason: HOSPADM

## 2022-09-21 RX ORDER — PROPOFOL 10 MG/ML
VIAL (ML) INTRAVENOUS
Status: DISCONTINUED | OUTPATIENT
Start: 2022-09-21 | End: 2022-09-21

## 2022-09-21 RX ORDER — CIPROFLOXACIN 2 MG/ML
400 INJECTION, SOLUTION INTRAVENOUS
Status: COMPLETED | OUTPATIENT
Start: 2022-09-21 | End: 2022-09-21

## 2022-09-21 RX ADMIN — PHENYLEPHRINE HYDROCHLORIDE 200 MCG: 10 INJECTION INTRAVENOUS at 03:09

## 2022-09-21 RX ADMIN — FENTANYL CITRATE 100 MCG: 50 INJECTION, SOLUTION INTRAMUSCULAR; INTRAVENOUS at 02:09

## 2022-09-21 RX ADMIN — LIDOCAINE HYDROCHLORIDE 100 MG: 20 INJECTION INTRAVENOUS at 02:09

## 2022-09-21 RX ADMIN — ONDANSETRON 4 MG: 2 INJECTION, SOLUTION INTRAMUSCULAR; INTRAVENOUS at 02:09

## 2022-09-21 RX ADMIN — SODIUM CHLORIDE, SODIUM GLUCONATE, SODIUM ACETATE, POTASSIUM CHLORIDE, MAGNESIUM CHLORIDE, SODIUM PHOSPHATE, DIBASIC, AND POTASSIUM PHOSPHATE: .53; .5; .37; .037; .03; .012; .00082 INJECTION, SOLUTION INTRAVENOUS at 01:09

## 2022-09-21 RX ADMIN — PROPOFOL 150 MG: 10 INJECTION, EMULSION INTRAVENOUS at 02:09

## 2022-09-21 RX ADMIN — CIPROFLOXACIN 400 MG: 2 INJECTION INTRAVENOUS at 02:09

## 2022-09-21 RX ADMIN — LEVALBUTEROL HYDROCHLORIDE 1.25 MG: 1.25 SOLUTION, CONCENTRATE RESPIRATORY (INHALATION) at 04:09

## 2022-09-21 RX ADMIN — DEXAMETHASONE SODIUM PHOSPHATE 4 MG: 4 INJECTION, SOLUTION INTRA-ARTICULAR; INTRALESIONAL; INTRAMUSCULAR; INTRAVENOUS; SOFT TISSUE at 02:09

## 2022-09-21 RX ADMIN — ACETAMINOPHEN 1000 MG: 10 INJECTION, SOLUTION INTRAVENOUS at 02:09

## 2022-09-21 RX ADMIN — SODIUM CHLORIDE, SODIUM GLUCONATE, SODIUM ACETATE, POTASSIUM CHLORIDE, MAGNESIUM CHLORIDE, SODIUM PHOSPHATE, DIBASIC, AND POTASSIUM PHOSPHATE: .53; .5; .37; .037; .03; .012; .00082 INJECTION, SOLUTION INTRAVENOUS at 04:09

## 2022-09-21 NOTE — ANESTHESIA PREPROCEDURE EVALUATION
09/21/2022  Amanda Pereira is a 76 y.o., female.      Pre-op Assessment    I have reviewed the Patient Summary Reports.     I have reviewed the Nursing Notes. I have reviewed the NPO Status.   I have reviewed the Medications.     Review of Systems  Cardiovascular:  Cardiovascular Normal     Pulmonary:  Pulmonary Normal    Renal/:   Chronic Renal Disease renal calculi    Hepatic/GI:   GERD    Neurological:  Neurology Normal    Endocrine:   Diabetes    Psych:   Psychiatric History depression          Physical Exam  General: Well nourished    Airway:  Mallampati: II   Neck ROM: Normal ROM    Dental:  Intact    Chest/Lungs:  Clear to auscultation, Normal Respiratory Rate    Heart:  Rate: Normal  Rhythm: Regular Rhythm        Anesthesia Plan  Type of Anesthesia, risks & benefits discussed:    Anesthesia Type: Gen Supraglottic Airway  Post Op Pain Control Plan: multimodal analgesia and IV/PO Opioids PRN  Induction:  IV and Inhalation  Informed Consent: Informed consent signed with the Patient and all parties understand the risks and agree with anesthesia plan.  All questions answered.   ASA Score: 2    Ready For Surgery From Anesthesia Perspective.     .

## 2022-09-21 NOTE — ANESTHESIA PROCEDURE NOTES
Intubation    Date/Time: 9/21/2022 2:41 PM  Performed by: Harshil Johnson CRNA  Authorized by: Bud Bowden MD     Intubation:     Induction:  Intravenous    Intubated:  Postinduction    Mask Ventilation:  N/a    Attempts:  1    Attempted By:  CRNA    Difficult Airway Encountered?: No      Complications:  None    Airway Device:  Supraglottic airway/LMA    Airway Device Size:  3.0    Style/Cuff Inflation:  Cuffed    Secured at:  The lips    Placement Verified By:  Capnometry    Complicating Factors:  None    Findings Post-Intubation:  BS equal bilateral

## 2022-09-21 NOTE — TELEPHONE ENCOUNTER
----- Message from Pepper Salas MD sent at 9/21/2022  3:25 PM CDT -----  ·  · ctrss in 4 to 6 weeks to see what remains of stone - our nurse will schedule  · Return to see urology nurse practitioner in 8 weeks to review xray, bring stone fragments for analysis. Try another oab med.

## 2022-09-21 NOTE — H&P
[facility] Note - [place]  Staff: MD Maritza  PCP: Kareem Sosa MD  Date of Service: 09/21/2022      Subjective:     HPI: Amanda Pereira is a 76 y.o. female     Pt saw radha sherrie 12/5/12 for flank pain, oab and possible h/o stones. Urine cultures were negative. and by that point had a CTRSS 11/29/12 showing a 5mm stone on left      She then saw  2/8/13 . He did a cysto urethral dilation. kub showed 8mm stone over L kidney.       She saw Brea rodriguez 10/31/14 for L flank pain with nausea. Voided ua showed leuk and blood.     kub 10/23/14 identifies a stone but not in location c/w seen on ct and previous kub      12/10/14 interval history by me: She saw me 12/10/14 with c/o L flank pain stating pain occurs with activity and decaf coffee, was also having some UUI. Pt states that she had left flank pain for the past few years ever since her kidney stone was first identified. She was going through a lot with her  going through cancer treatment and eventually passing away. She says she has pain whenever she drinks decaf coffee, does housework and describes the pain as a throbbing pain. Her known stone is a left nonobstructing stone.      She also describes urgency and urge incontinence occassionally. Last week she wet all her clothes because she was unable to control it. She's been having trouble controlling her urine for the past few weeks. She also describes leaking urine every time she coughs. She does not wear any pads. She does not have to change her underwear every day. Her UUI bothers her the most.  She also describes difficulty voiding in the morning. Stream improves throughout the day.    pvr by scan:0. ua neg.   I ordered a rbus and started her on vesicare and premarin cream. rbus showed 5mm stone in L kd without hydro.     Ct 2018 at [facility] done for diarrhea:LLP stone only. No new stones.         Interval history 6/9/22 :  She thinks she's been having uti's the  past few months. Symptoms include dysuria, significant urge incontinence, peach colored cloudiness. Hasn't passed any kidney stones. Hasn't had any left flank pain.  Usually resolves with azo. Denies any GH. Significant dry mouth from anti-depressants?    About a week ago she saw pcp, she was started on abx and finished them yesterday.     When she doesn't have a uti has some oab. Voids 3-4x a day. ua today with tr bld.     Interval history 8/22/22:  At last visit recommended starting estrace cream and utiva to help prevent uti/uti sx. She's using hormone cream every other night but not utiva. hasn not had a uti in the last 2.5 months.      Also ordered a kub 6/27/22 which showed stone in LLP. No other obvious stones. rbus showed no hydro. No mention of stones bc bowel obscuring LLQ. . ctrss previously showed:  (12cm SSD, 1200 HU).     She also says she is urinating every 1-2 hours (day and night). Wearing 2-3 pads/day- wet when she changes them.      ua void: small leuk but cath urine with none.         Interval history 9/21/22:  At last visit on 8/22 had pt continue estrace cream twice a week to help prevent uti's (she has been using this) Started ditropan for oab/incontinence (she says doesn't feel like it has helped much) and scheduled eswl of LLP stone. Here for this today.  Ua on 9/13 was neg.   Kub today does not clearly show the stone.     Urine history: family history of kidney, bladder or prostate cancer:No, personal or family history of kidney stones: No,tobacco use: No, anticoagulation: No. No cardiologist. NEEDS CATHETERIZED URINES.   9/13/22 Ng, cath: tr bld  8/22/22 Small leuk, cath: tr bld, pvr by io:   6/9/22  Tr bld   12/10/14 Neg  10/27/14 2+bili/+wbc/50 bld  10/23/14 diptheroids  11/29/12 Ng, void: >1000 glucose  11/21/12 Tr prot    REVIEW OF SYSTEMS:  Negative except for as stated above    Past Medical History:   Diagnosis Date    Depression     Diabetes mellitus     borderline    Kidney stone         Past Surgical History:   Procedure Laterality Date    TONSILLECTOMY, ADENOIDECTOMY      TUBAL LIGATION      WISDOM TOOTH EXTRACTION              Objective:     Vitals:    09/21/22 1203   BP: 132/60   Pulse: 62   Resp: 18   Temp: 97.4 °F (36.3 °C)       General:wdwn  in NAD  Neurologic: CN grossly normal. Normal sensation.   Psychiatric: awake, alert and oriented x 3. Mood and affect Normal. Cooperative.  Eyes: PERRLA, normal conjunctiva  Respiratory: no increased work on breathing. No wheezing.   Cardiovascular: No obvious extremity edema. Warm and well perfused.  GI: no obvious stomach distension  Musculoskeletal: normal  range of motion of bilateral upper extremities. Normal muscle strength and tone.  Skin: no obvious rashes or lesions. No tightening of skin noted.    Pertinent  exam in HPI    Recent Labs   Lab 09/13/22  1032   WBC 12.75 H   Hemoglobin 14.1   Hematocrit 44.5   Platelets 295   ]  Recent Labs   Lab 09/13/22  1032   Sodium 140   Potassium 4.0   Chloride 106   CO2 24   BUN 11   Creatinine 1.0   Glucose 118 H   Calcium 9.0   ]    No results found for: LABA1C, HGBA1C          Assessment:     Amanda Pereira is a 76 y.o. female with     1. Left nephrolithiasis          Plan:       Short plan:  Cystoscopy possible L rgp if unable to stone on flouro table. (Not easily seen on xray today). If not able to be seen will do rgp and obtain a ct, confirm stone still present (last ct in 2018-but kub in June showed stone) and will return for stone extraction in 2 weeks.   Continue estrace cream every other night at minimum 2x a week to help prevent uti's. Needs cath urines in the future.   continue ditropan/oxybutynin 10mg XL once in the morning to see if it helps with frequency and urine leakage.   eSWL/shock wave of LLP stone on Wednesday sept 21st/today    Extracorporeal shock wave lithotripsy is planned for left lower pole 9mm STONE on 9/21:  The stone is radioopaque.   The stone is located on the left at  the level of L3 and measures 10 mm.   CT showed the stone was 1200 [place] units and 12 cm skin to stone distance.     The patient does not have more stones in right kidney. (but last ct in 2018 neg. rbus recently neg.   The patient was counseled that if the stone does not appear to break up that I can place a stent and return for stone extraction 2-3 weeks later.   The patient was counseled to stop any blood thinners including Goody's Powder, BC Powder 1 week prior the procedure.     The patient is scheduled for ESWL. The risks and benefits of extracorporeal shock wave lithotripsy were discussed with the patient in detail.  Consent was obtained.  The risks include but are not limited to bleeding in or around the kidney, infection, pain, incomplete fragmentation of the stone requiring a repeat treatment or a different form of treatment, obstruction of the kidney by stone particles possibly requiring a ureteral stent or nephrostomy tube, injury to or loss of the kidney ,injury to the ureter or bladder, need for further procedures, hypertension (transient or permanent), recurrence of stones, and need for open surgery.  Alternative treatments were also discussed with the patient in detail to include ureteroscopy, percutaneous treatment of the stone, open surgery  and observation. Patient understands these risks and has agreed to proceed with surgery.          Pepper Salas MD

## 2022-09-21 NOTE — OP NOTE
[facility]  Operative Note    Date: 09/21/2022    Pre-Op Diagnosis: Left renal stone    Post-Op Diagnosis: same    Procedure(s) Performed:   1.  Left ESWL  2.  Left retrograde pyelogram  Fluoro < 1 h    Stent indications:     Specimen(s): none    Staff Surgeon: Pepper Salas MD    Findings:   The stone is located in the left lower pole and measures about 9mm. Very faint. Retrograde confirmed stone in similar location.   By the end of the procedure the stone appeared less dense.     Estimated Blood Loss: none    Drains:   1.  6 x 24 Left Double J ureteral stent with strings    Implants: * No implants in log *    Anesthesia: Monitored Local Anesthesia with Sedation    Indications:Amanda Pereira is a 76 y.o. female with a  left renal stone presenting for definitive stone management.  The stone is radio-opaque on KUB.      Procedure in Detail:  After risks, benefits and possible complications of the procedure were explained, the patient elected to undergo the procedure and informed consent was obtained.  All questions were answered in the kyler-operative area. The patient was transferred to the cystoscopy suite and placed on the ESWL table in the supine position.  SCDs were applied and working. Time out was performed, kyler-procedural antibiotics were given. MAC anesthesia was administered.  After adequate anesthesia the patient was placed in frog leg position and prepped and draped in the usual sterile fashion.     A flexible cystoscope was introduced into the patients bladder per urethra.  This passed easily.  The entire urethra was visualized and revealed no strictures or masses.  Formal cystoscopy was performed which showed the right and left ureteral orifices in the normal anatomic position effluxing clear urine.  There were no masses or lesions seen, no bladder stones, no diverticuli and no trabeculations.      My attention was turned to the patients left ureteral orifice.    A 0.38 guide  wire was advanced up the left ureteral orifice. This was confirmed using fluoro.  I then passed  a 5 Chinese open ended ureteral catheter   Over the wire and removed the wire. Then performed a left retrograde pyelogram.  A 180 degree coil was observed in the renal pelvis as well as the bladder using fluoro.  A 180 degree coil was also seen using direct visualization in the bladder.       The patient's left lower poleleft stone was aligned on the X-Y- and Z axis and left ESWL was begun.  3000 thousand shocks were administered at a rate of 1.5 shocks per second, beginning at 16 KV of power and advanced to 26 KV. Intermittent fluoroscopy was used to monitor fragmentation of the stone.    At the end of the procedure the stone appeared less dense.      The patient tolerated the procedure well and was transferred to the PACU in stable condition.      Plan:   Strain all urine for the next 3 days   from the pharmacy if you don't already have: tramadol, flomax and doxycycline   Inversion therapy for 3 days (see below for details)  ctrss in 4 to 6 weeks to see what remains of stone - our nurse will schedule  Return to see urology nurse practitioner in 8 weeks to review xray, bring stone fragments for analysis. Try another oab med.   Follow up with  on regular basis for other urologic issues being managed -call to make follow up based on last visit in clinic if no appointment made  Continue estrace cream twice weekly to the vagina  DISCONTINUE DITROPAN.MAY BE CAUSING TOO MUCH DRY MOUTH. Try myrbetriq 50mg once daily alan Salas MD

## 2022-09-21 NOTE — TRANSFER OF CARE
"Anesthesia Transfer of Care Note    Patient: Amanda Pereira    Procedure(s) Performed: Procedure(s) (LRB):  LITHOTRIPSY, ESWL (Left)  CYSTOSCOPY (N/A)    Patient location: PACU    Anesthesia Type: general    Transport from OR: Transported from OR on 2-3 L/min O2 by NC with adequate spontaneous ventilation    Post pain: adequate analgesia    Post assessment: no apparent anesthetic complications    Post vital signs: stable    Level of consciousness: awake    Nausea/Vomiting: no nausea/vomiting    Complications: none    Transfer of care protocol was followed      Last vitals:   Visit Vitals  /60 (BP Location: Left arm, Patient Position: Sitting)   Pulse 62   Temp 36.3 °C (97.4 °F) (Skin)   Resp 18   Ht 5' 3" (1.6 m)   Wt 68 kg (150 lb)   LMP  (LMP Unknown)   SpO2 (!) 92%   Breastfeeding No   BMI 26.57 kg/m²     "

## 2022-09-21 NOTE — PLAN OF CARE
Dr. Bowden notified of patient's oxygen level of 84-87%.  After deep breathing and IS machine, oxygen increases to 94-97%. But then goes back down to 80's.  Xopenex 1.25mg neb. Treatment ordered.  Jahaira, rn

## 2022-09-21 NOTE — PATIENT INSTRUCTIONS
Your urologist is: Dr.Jennifer Salas  Office number: [phone]  Address: [address] [place] [address], [address], [facility] [address]      PLEASE READ the following directions and contact our office with any questions via phone or the portal. If you were told that you need an appointment and no appointment was made, call the office the day after surgery and ask to speak with 's nurse to make an appointment.    Strain all urine for the next 3 days   from the pharmacy if you don't already have: tramadol, flomax and doxycycline   Inversion therapy for 3 days (see below for details)  ctrss in 4 to 6 weeks to see what remains of stone - our nurse will schedule  Return to see urology nurse practitioner in 8 weeks to review xray, bring stone fragments for analysis. Try another oab med.   Follow up with  on regular basis for other urologic issues being managed -call to make follow up based on last visit in clinic if no appointment made  Continue estrace cream twice weekly to the vagina  DISCONTINUE DITROPAN.MAY BE CAUSING TOO MUCH DRY MOUTH. Try myrbetriq 50mg once daily instead    ESWL post-op instructions for :    Home with:  -urine strainer: strain all urine and bring fragments to appointment so we can send for analysis    Home medications and prescriptions:  Flomax/tamsulosin 0.4mg by mouth nightly for 1 week to help stretch ureter and pass stone fragment (can cause lightheadness if you stand up too fast, so take at night)  tramadol 10mg take every 4 to 6 hours as needed (dispense  15) for pain if tylenol or ibuprofen not helping-take miralax 17g daily while taking pain medicine  Blood thinners: you can restart any blood thinners 2 days later if urine is clear yellow, if not, and patient is unsure of when to restart then call office. If planning on having a planned ureteroscopy in two weeks then will remain off blood thinners until then. If patient is scheduled for a  cystoscopy stent removal only can start the blood thinners as recommended.     Follow up:  You may possibly follow up in clinic in 4 weeks and most likely with the nurse practitioner.   You should have an xray scheduled before your follow-up to see how you responded to the shock wave.   If you do not have this scheduled (xray or follow-up, please call the clinic within 2 days of your procedure).    if you still have more stones we will continue to monitor these and decide on treatment once they are larger  if this is not your first stone then we will likely proceed with a workup to figure out why you make stones (this includes a 24 hour urine collection and some blood tests)  Bring stone fragments with you to your next appointment.    no stent was placed:  See stent instructions    You have a stone in lower pole (or bottom of the kidney), so I recommend inversion therapy  You will drink 1 bottle of water firstand 15 minutes later you will  radha over and have someone or yourself palpate your back for about a minute to see if we can get the stone fragments to move up and out (over the hill out of the bottom of the kidney)  You will do this 3 times a day for a total of 3 days      Other instructions  You may see some blood in your urine while the stone fragments are passing and a few days afterward. Do not be alarmed, even if the urine was clear for a while. Push fluids and refrain from strenuous activity until clearing occurs. If you have difficulty passing clots or don't improve, call us. You can also try sitting in a warm tub of water to help to urinate if needed. Avoid medications such as Aspirin, Advil, Motrin, Plavix, or Coumadin, which thin the blood and cause bleeding.  If you have never had your stones analyzed, strain all urine and bring stone fragments with you to your next appointment.    Diet:  You may return to your normal diet immediately. Alcohol, spicy foods, acidy foods and drinks with caffeine may  cause irritation or frequency and should be used in moderation. To keep your urine flowing freely and to avoid constipation, drink plenty of fluids during the day (8-10 glasses).    Activity:  While the kidney is healing do not engage in strenuous activity. If you are active, you may see more blood in the urine. We would suggest cutting down your activity under these circumstances until the bleeding has stopped, perhaps two weeks.    Bowels:  It is important to keep your bowels regular during the postoperative period. Straining with bowel movements can cause bleeding. A bowel movement every other day is reasonable. Use a mild over-the-counter laxative if needed, such as Milk of Magnesia 2-3 tablespoons, or 1-2 Dulcolax tablets. Call if you continue to have problems. Narcotics can worsen constipation; if you had been taking narcotics for pain, before, during or after your surgery, you may be constipated. Ditropan for bladder spasms may also cause constipation.    Problems you should report to us:  Fevers over 101.5 degrees Fahrenheit  Inability to urinate.   Drug reactions (hives, rash, nausea, vomiting, diarrhea)   Severe burning or pain with urination that is not improving.   Severe pain in your kidney not relieved with pain medications, go to ER if this occurs. You could be passing a large stone or have a blood clot around the kidney. They will likely do a ct scan to determine. Please call our office so we  know that you are heading to the ER.   You will also have some burning with urination. This is normal after stone therapy and is also expected while the stent is in place. This burning should be mild or moderate and should improve over time. Severe burning, especially when it is not improving, can be a sign of infection.    Call Urology at  with any questions

## 2022-09-21 NOTE — DISCHARGE SUMMARY
[facility]  Urology  Discharge Note - Short Stay      Patient Name: Amanda Pereira  MRN: [id number]  Discharge Date and Time:  09/21/2022 3:26 PM  Attending Physician: Pepper Salas,*   Discharging Provider: Pepper Salas MD  Primary Care Physician: Kareem Sosa MD    There are no hospital problems to display for this patient.      Final Diagnoses: Same as principal problem.    Hospital Course: Patient was admitted for an outpatient procedure and tolerated the procedure well with no complications.*    Procedure(s) (LRB):  LITHOTRIPSY, ESWL (Left)  CYSTOSCOPY (N/A)     Indwelling Lines/Drains at time of discharge:   Lines/Drains/Airways       None                   Discharged Condition: good    Disposition: home    Follow Up:      Patient Instructions:      CT Renal Stone Study ABD Pelvis WO   Standing Status: Future Standing Exp. Date: 09/21/23     Order Specific Question Answer Comments   May the Radiologist modify the order per protocol to meet the clinical needs of the patient? Yes        Medications:  Reconciled Home Medications:      Medication List        START taking these medications      doxycycline 100 MG tablet  Commonly known as: VIBRA-TABS  Take 1 tablet (100 mg total) by mouth 2 (two) times daily. for 3 days     MYRBETRIQ 50 mg Tb24  Generic drug: mirabegron  Take 1 tablet (50 mg total) by mouth every morning.     tamsulosin 0.4 mg Cap  Commonly known as: FLOMAX  Take 1 capsule (0.4 mg total) by mouth every evening. Take nightly for stent/stone     traMADoL 50 mg tablet  Commonly known as: ULTRAM  Take 1 tablet (50 mg total) by mouth every 6 (six) hours as needed for Pain.            CONTINUE taking these medications      aluminum-magnesium hydroxide-simethicone 200-200-20 mg/5 mL Susp  Commonly known as: MAALOX  Take 30 mLs by mouth.     EScitalopram oxalate 20 MG tablet  Commonly known as: LEXAPRO  Take 20 mg by mouth every morning.     estradioL 0.01 % (0.1  mg/gram) vaginal cream  Commonly known as: ESTRACE  Place 1 g vaginally once daily. Apply pea sized amount up to second knuckle. Apply nightly for 2 weeks then every other night.     IMODIUM A-D ORAL  Take by mouth.     labetaloL 200 MG tablet  Commonly known as: NORMODYNE  Take 200 mg by mouth every evening.     lamoTRIgine 150 MG Tab  Commonly known as: LAMICTAL  Take 150 mg by mouth 2 (two) times daily.     pantoprazole 40 MG tablet  Commonly known as: PROTONIX  Take 1 tablet (40 mg total) by mouth once daily.            STOP taking these medications      oxybutynin 10 MG 24 hr tablet  Commonly known as: DITROPAN-XL              Discharge Procedure Orders (must include Diet, Follow-up, Activity):   Discharge Procedure Orders (must include Diet, Follow-up, Activity)   CT Renal Stone Study ABD Pelvis WO   Standing Status: Future Standing Exp. Date: 09/21/23     Order Specific Question Answer Comments   May the Radiologist modify the order per protocol to meet the clinical needs of the patient? Yes       Strain all urine for the next 3 days   from the pharmacy if you don't already have: tramadol, flomax and doxycycline   Inversion therapy for 3 days (see below for details)  ctrss in 4 to 6 weeks to see what remains of stone - our nurse will schedule  Return to see urology nurse practitioner in 8 weeks to review xray, bring stone fragments for analysis. Try another oab med.   Follow up with  on regular basis for other urologic issues being managed -call to make follow up based on last visit in clinic if no appointment made  Continue estrace cream twice weekly to the vagina  DISCONTINUE DITROPAN.MAY BE CAUSING TOO MUCH DRY MOUTH. Try myrbetriq 50mg once daily instead      Pepper Salas MD  Urology  [facility]

## 2022-09-21 NOTE — PLAN OF CARE
Released from Pacu when criteria met pain controlled skin w+d No nausea No emesis    Sleepy but x4 encouraged deep breaths  reinstr on IS encouraged use Pt has all belongings   in post op with nurses   DUe to void

## 2022-09-21 NOTE — PLAN OF CARE
Patient tolerated procedure and anesthesia well.  No complaints.  No apparent distress noted or reported. Vitals stable.  Denies pain denies nausea.  Tolerates po.  Discharge instructions reviewed with patient and family members.  Verbalized understanding.  Consents with chart.

## 2022-11-02 ENCOUNTER — HOSPITAL ENCOUNTER (OUTPATIENT)
Dept: RADIOLOGY | Facility: HOSPITAL | Age: 77
Discharge: HOME | End: 2022-11-02
Attending: UROLOGY
Payer: MEDICARE

## 2022-11-02 DIAGNOSIS — N20.0 LEFT NEPHROLITHIASIS: ICD-10-CM

## 2022-11-02 PROCEDURE — 74176 CT RENAL STONE STUDY ABD PELVIS WO: ICD-10-PCS | Mod: 26,,, | Performed by: RADIOLOGY

## 2022-11-02 PROCEDURE — 74176 CT ABD & PELVIS W/O CONTRAST: CPT | Mod: 26,,, | Performed by: RADIOLOGY

## 2022-11-02 PROCEDURE — 74176 CT ABD & PELVIS W/O CONTRAST: CPT | Mod: TC

## 2023-03-03 ENCOUNTER — OFFICE VISIT (OUTPATIENT)
Dept: URGENT CARE | Facility: CLINIC | Age: 78
End: 2023-03-03
Payer: MEDICARE

## 2023-03-03 VITALS
HEART RATE: 89 BPM | BODY MASS INDEX: 26.75 KG/M2 | DIASTOLIC BLOOD PRESSURE: 62 MMHG | RESPIRATION RATE: 16 BRPM | TEMPERATURE: 98 F | WEIGHT: 145.38 LBS | HEIGHT: 62 IN | OXYGEN SATURATION: 95 % | SYSTOLIC BLOOD PRESSURE: 92 MMHG

## 2023-03-03 DIAGNOSIS — R30.0 DYSURIA: Primary | ICD-10-CM

## 2023-03-03 DIAGNOSIS — J22 LOWER RESPIRATORY INFECTION: ICD-10-CM

## 2023-03-03 LAB
BILIRUB UR QL STRIP: NEGATIVE
GLUCOSE UR QL STRIP: NEGATIVE
KETONES UR QL STRIP: NEGATIVE
LEUKOCYTE ESTERASE UR QL STRIP: NEGATIVE
PH, POC UA: 5
POC BLOOD, URINE: NEGATIVE
POC NITRATES, URINE: NEGATIVE
PROT UR QL STRIP: POSITIVE
SP GR UR STRIP: 1.02 (ref 1–1.03)
UROBILINOGEN UR STRIP-ACNC: ABNORMAL (ref 0.1–1.1)

## 2023-03-03 PROCEDURE — 81003 POCT URINALYSIS, DIPSTICK, AUTOMATED, W/O SCOPE: ICD-10-PCS | Mod: QW,S$GLB,, | Performed by: NURSE PRACTITIONER

## 2023-03-03 PROCEDURE — 1159F MED LIST DOCD IN RCRD: CPT | Mod: CPTII,S$GLB,, | Performed by: NURSE PRACTITIONER

## 2023-03-03 PROCEDURE — 3078F PR MOST RECENT DIASTOLIC BLOOD PRESSURE < 80 MM HG: ICD-10-PCS | Mod: CPTII,S$GLB,, | Performed by: NURSE PRACTITIONER

## 2023-03-03 PROCEDURE — 99214 PR OFFICE/OUTPT VISIT, EST, LEVL IV, 30-39 MIN: ICD-10-PCS | Mod: S$GLB,,, | Performed by: NURSE PRACTITIONER

## 2023-03-03 PROCEDURE — 3074F SYST BP LT 130 MM HG: CPT | Mod: CPTII,S$GLB,, | Performed by: NURSE PRACTITIONER

## 2023-03-03 PROCEDURE — 81003 URINALYSIS AUTO W/O SCOPE: CPT | Mod: QW,S$GLB,, | Performed by: NURSE PRACTITIONER

## 2023-03-03 PROCEDURE — 1160F PR REVIEW ALL MEDS BY PRESCRIBER/CLIN PHARMACIST DOCUMENTED: ICD-10-PCS | Mod: CPTII,S$GLB,, | Performed by: NURSE PRACTITIONER

## 2023-03-03 PROCEDURE — 3078F DIAST BP <80 MM HG: CPT | Mod: CPTII,S$GLB,, | Performed by: NURSE PRACTITIONER

## 2023-03-03 PROCEDURE — 1160F RVW MEDS BY RX/DR IN RCRD: CPT | Mod: CPTII,S$GLB,, | Performed by: NURSE PRACTITIONER

## 2023-03-03 PROCEDURE — 3074F PR MOST RECENT SYSTOLIC BLOOD PRESSURE < 130 MM HG: ICD-10-PCS | Mod: CPTII,S$GLB,, | Performed by: NURSE PRACTITIONER

## 2023-03-03 PROCEDURE — 1159F PR MEDICATION LIST DOCUMENTED IN MEDICAL RECORD: ICD-10-PCS | Mod: CPTII,S$GLB,, | Performed by: NURSE PRACTITIONER

## 2023-03-03 PROCEDURE — 99214 OFFICE O/P EST MOD 30 MIN: CPT | Mod: S$GLB,,, | Performed by: NURSE PRACTITIONER

## 2023-03-03 RX ORDER — AZELASTINE 1 MG/ML
1 SPRAY, METERED NASAL 2 TIMES DAILY
Qty: 30 ML | Refills: 0 | Status: ON HOLD | OUTPATIENT
Start: 2023-03-03 | End: 2023-11-22 | Stop reason: HOSPADM

## 2023-03-03 RX ORDER — BENZONATATE 200 MG/1
200 CAPSULE ORAL 3 TIMES DAILY PRN
Qty: 30 CAPSULE | Refills: 0 | Status: SHIPPED | OUTPATIENT
Start: 2023-03-03 | End: 2023-03-13

## 2023-03-03 RX ORDER — DOXYCYCLINE 100 MG/1
100 CAPSULE ORAL 2 TIMES DAILY
Qty: 20 CAPSULE | Refills: 0 | Status: SHIPPED | OUTPATIENT
Start: 2023-03-03 | End: 2023-11-20

## 2023-03-03 NOTE — PROGRESS NOTES
"Subjective:       Patient ID: Amanda Pereira is a 77 y.o. female.    Vitals:  height is 5' 2" (1.575 m) and weight is 66 kg (145 lb 6.4 oz). Her oral temperature is 97.7 °F (36.5 °C). Her blood pressure is 92/62 and her pulse is 89. Her respiration is 16 and oxygen saturation is 95%.     Chief Complaint: Urinary Tract Infection    Pt also states "Dry cough x's 3 weeks; was seen by doctor and was prescribed steroids and antibiotics but was not able to fill the Rx for antibiotics." 2/16. Did not take antibitoics. Had steroid shot and medrol dose pack, worsening.     Past Medical History:  No date: Depression  No date: Diabetes mellitus      Comment:  borderline  No date: Kidney stone    Past Surgical History:  9/21/2022: CYSTOSCOPY; N/A      Comment:  Procedure: CYSTOSCOPY;  Surgeon: Pepper Salas MD;  Location: [facility] OR;  Service: Urology;                 Laterality: N/A;  9/21/2022: EXTRACORPOREAL SHOCK WAVE LITHOTRIPSY; Left      Comment:  Procedure: LITHOTRIPSY, ESWL;  Surgeon: Pepper Salas MD;  Location: [facility] OR;  Service: Urology;                 Laterality: Left;  9/21/2022: RETROGRADE PYELOGRAPHY; Left      Comment:  Procedure: PYELOGRAM, RETROGRADE;  Surgeon: Pepper Salas MD;  Location: [facility] OR;  Service: Urology;                 Laterality: Left;  No date: TONSILLECTOMY, ADENOIDECTOMY  No date: TUBAL LIGATION  No date: WISDOM TOOTH EXTRACTION    Review of patient's family history indicates:      Social History    Socioeconomic History      Marital status:       Number of children: 1    Tobacco Use      Smoking status: Never      Smokeless tobacco: Never    Substance and Sexual Activity      Alcohol use: No      Drug use: No      Sexual activity: Not Currently      Current Outpatient Medications:  aluminum-magnesium hydroxide-simethicone (MAALOX) 200-200-20 mg/5 mL Susp, Take 30 mLs by mouth., Disp: , Rfl:   EScitalopram " oxalate (LEXAPRO) 20 MG tablet, Take 20 mg by mouth every morning., Disp: , Rfl:   estradioL (ESTRACE) 0.01 % (0.1 mg/gram) vaginal cream, Place 1 g vaginally once daily. Apply pea sized amount up to second knuckle. Apply nightly for 2 weeks then every other night., Disp: 42.5 g, Rfl: 3  labetaloL (NORMODYNE) 200 MG tablet, Take 200 mg by mouth every evening., Disp: , Rfl:   lamoTRIgine (LAMICTAL) 150 MG Tab, Take 150 mg by mouth 2 (two) times daily., Disp: , Rfl:   loperamide HCl (IMODIUM A-D ORAL), Take by mouth., Disp: , Rfl:   mirabegron (MYRBETRIQ) 50 mg Tb24, Take 1 tablet (50 mg total) by mouth every morning., Disp: 90 tablet, Rfl: 3  traMADoL (ULTRAM) 50 mg tablet, Take 1 tablet (50 mg total) by mouth every 6 (six) hours as needed for Pain., Disp: 10 tablet, Rfl: 0  pantoprazole (PROTONIX) 40 MG tablet, Take 1 tablet (40 mg total) by mouth once daily., Disp: 30 tablet, Rfl: 0  tamsulosin (FLOMAX) 0.4 mg Cap, Take 1 capsule (0.4 mg total) by mouth every evening. Take nightly for stent/stone, Disp: 30 capsule, Rfl: 0    No current facility-administered medications for this visit.      Review of patient's allergies indicates:   -- Pcn [penicillins] -- Hives   -- Promethazine hcl -- Other (See Comments)    --  Gets muscle spasams   -- Reglan [metoclopramide hcl] -- Other (See Comments)    --  Gets muscle spasams     Urinary Tract Infection   This is a recurrent problem. Episode onset: a month or two ago. The problem has been gradually worsening. The quality of the pain is described as burning. Associated symptoms include flank pain, frequency, hesitancy and urgency. Pertinent negatives include no chills. She has tried nothing for the symptoms. Her past medical history is significant for recurrent UTIs.     Constitution: Positive for fatigue. Negative for chills and fever.   HENT:  Positive for ear pain, postnasal drip, sinus pressure and sore throat.    Respiratory:  Positive for cough and sputum production.     Gastrointestinal: Negative.    Genitourinary:  Positive for frequency, urgency and flank pain.   Neurological: Negative.      Objective:      Physical Exam   Constitutional: She is oriented to person, place, and time.  Non-toxic appearance. No distress.   HENT:   Head: Normocephalic and atraumatic.   Ears:   Right Ear: Tympanic membrane normal.   Left Ear: Tympanic membrane normal.   Nose: Congestion present.   Mouth/Throat: Oropharyngeal exudate present.   Eyes: Pupils are equal, round, and reactive to light.   Cardiovascular: Normal rate and regular rhythm.   Pulmonary/Chest: Effort normal and breath sounds normal. No respiratory distress.   Abdominal: Normal appearance and bowel sounds are normal. There is no abdominal tenderness.   Neurological: She is alert and oriented to person, place, and time.   Psychiatric: Her behavior is normal. Mood normal.       Assessment:       1. Dysuria    2. Lower respiratory infection          Plan:       UA no blood or bacteria, discussed with patient. Treat as below, Follow up with PCP if symptoms are not resolving in 48-72 hours, follow up immediately for new or worsening symptoms, ED precautions discussed.    Dysuria  -     POCT Urinalysis, Dipstick, Automated, W/O Scope    Lower respiratory infection  -     doxycycline (MONODOX) 100 MG capsule; Take 1 capsule (100 mg total) by mouth 2 (two) times daily.  Dispense: 20 capsule; Refill: 0  -     azelastine (ASTELIN) 137 mcg (0.1 %) nasal spray; 1 spray (137 mcg total) by Nasal route 2 (two) times daily.  Dispense: 30 mL; Refill: 0  -     benzonatate (TESSALON) 200 MG capsule; Take 1 capsule (200 mg total) by mouth 3 (three) times daily as needed.  Dispense: 30 capsule; Refill: 0

## 2023-11-20 ENCOUNTER — HOSPITAL ENCOUNTER (INPATIENT)
Facility: HOSPITAL | Age: 78
LOS: 2 days | Discharge: HOME | DRG: 177 | End: 2023-11-22
Attending: EMERGENCY MEDICINE | Admitting: STUDENT IN AN ORGANIZED HEALTH CARE EDUCATION/TRAINING PROGRAM
Payer: MEDICARE

## 2023-11-20 DIAGNOSIS — R09.02 HYPOXIA: ICD-10-CM

## 2023-11-20 DIAGNOSIS — U07.1 COVID-19 VIRUS INFECTION: ICD-10-CM

## 2023-11-20 DIAGNOSIS — R55 SYNCOPE, UNSPECIFIED SYNCOPE TYPE: Primary | ICD-10-CM

## 2023-11-20 PROBLEM — J96.01 ACUTE HYPOXEMIC RESPIRATORY FAILURE: Status: ACTIVE | Noted: 2023-11-20

## 2023-11-20 LAB
ALBUMIN SERPL BCP-MCNC: 3.6 G/DL (ref 3.5–5.2)
ALP SERPL-CCNC: 85 U/L (ref 55–135)
ALT SERPL W/O P-5'-P-CCNC: 24 U/L (ref 10–44)
ANION GAP SERPL CALC-SCNC: 11 MMOL/L (ref 8–16)
APTT PPP: 31.4 SEC (ref 21–32)
AST SERPL-CCNC: 49 U/L (ref 10–40)
BACTERIA #/AREA URNS HPF: ABNORMAL /HPF
BASOPHILS # BLD AUTO: 0.07 K/UL (ref 0–0.2)
BASOPHILS NFR BLD: 0.9 % (ref 0–1.9)
BILIRUB SERPL-MCNC: 0.3 MG/DL (ref 0.1–1)
BILIRUB UR QL STRIP: NEGATIVE
BNP SERPL-MCNC: 41 PG/ML (ref 0–99)
BUN SERPL-MCNC: 13 MG/DL (ref 8–23)
CALCIUM SERPL-MCNC: 7.7 MG/DL (ref 8.7–10.5)
CHLORIDE SERPL-SCNC: 101 MMOL/L (ref 95–110)
CLARITY UR: CLEAR
CO2 SERPL-SCNC: 20 MMOL/L (ref 23–29)
COLOR UR: YELLOW
CREAT SERPL-MCNC: 0.8 MG/DL (ref 0.5–1.4)
D DIMER PPP IA.FEU-MCNC: 1.2 MG/L FEU (ref 0–0.49)
DIFFERENTIAL METHOD: ABNORMAL
EOSINOPHIL # BLD AUTO: 0.3 K/UL (ref 0–0.5)
EOSINOPHIL NFR BLD: 4 % (ref 0–8)
ERYTHROCYTE [DISTWIDTH] IN BLOOD BY AUTOMATED COUNT: 14.6 % (ref 11.5–14.5)
EST. GFR  (NO RACE VARIABLE): >60 ML/MIN/1.73 M^2
GLUCOSE SERPL-MCNC: 222 MG/DL (ref 70–110)
GLUCOSE SERPL-MCNC: 291 MG/DL (ref 70–110)
GLUCOSE SERPL-MCNC: 87 MG/DL (ref 70–110)
GLUCOSE SERPL-MCNC: 94 MG/DL (ref 70–110)
GLUCOSE UR QL STRIP: NEGATIVE
HCT VFR BLD AUTO: 43.6 % (ref 37–48.5)
HGB BLD-MCNC: 14 G/DL (ref 12–16)
HGB UR QL STRIP: NEGATIVE
HYALINE CASTS #/AREA URNS LPF: 5 /LPF
IMM GRANULOCYTES # BLD AUTO: 0.01 K/UL (ref 0–0.04)
IMM GRANULOCYTES NFR BLD AUTO: 0.1 % (ref 0–0.5)
INFLUENZA A, MOLECULAR: NEGATIVE
INFLUENZA B, MOLECULAR: NEGATIVE
INR PPP: 1 (ref 0.8–1.2)
KETONES UR QL STRIP: ABNORMAL
LACTATE SERPL-SCNC: 0.7 MMOL/L (ref 0.5–1.9)
LEUKOCYTE ESTERASE UR QL STRIP: ABNORMAL
LYMPHOCYTES # BLD AUTO: 2.1 K/UL (ref 1–4.8)
LYMPHOCYTES NFR BLD: 27.9 % (ref 18–48)
MAGNESIUM SERPL-MCNC: 1.8 MG/DL (ref 1.6–2.6)
MCH RBC QN AUTO: 29.8 PG (ref 27–31)
MCHC RBC AUTO-ENTMCNC: 32.1 G/DL (ref 32–36)
MCV RBC AUTO: 93 FL (ref 82–98)
MICROSCOPIC COMMENT: ABNORMAL
MONOCYTES # BLD AUTO: 1 K/UL (ref 0.3–1)
MONOCYTES NFR BLD: 13.6 % (ref 4–15)
NEUTROPHILS # BLD AUTO: 4 K/UL (ref 1.8–7.7)
NEUTROPHILS NFR BLD: 53.5 % (ref 38–73)
NITRITE UR QL STRIP: NEGATIVE
NRBC BLD-RTO: 0 /100 WBC
PH UR STRIP: 6 [PH] (ref 5–8)
PLATELET # BLD AUTO: 207 K/UL (ref 150–450)
PMV BLD AUTO: 10.4 FL (ref 9.2–12.9)
POTASSIUM SERPL-SCNC: 3.6 MMOL/L (ref 3.5–5.1)
PROCALCITONIN SERPL IA-MCNC: 0.11 NG/ML (ref 0–0.5)
PROT SERPL-MCNC: 6.5 G/DL (ref 6–8.4)
PROT UR QL STRIP: ABNORMAL
PROTHROMBIN TIME: 10.8 SEC (ref 9–12.5)
RBC # BLD AUTO: 4.7 M/UL (ref 4–5.4)
RBC #/AREA URNS HPF: 1 /HPF (ref 0–4)
SARS-COV-2 RDRP RESP QL NAA+PROBE: POSITIVE
SODIUM SERPL-SCNC: 132 MMOL/L (ref 136–145)
SP GR UR STRIP: 1.01 (ref 1–1.03)
SPECIMEN SOURCE: NORMAL
SQUAMOUS #/AREA URNS HPF: 2 /HPF
TROPONIN I SERPL HS-MCNC: 30.3 PG/ML (ref 0–14.9)
TROPONIN I SERPL HS-MCNC: 42.4 PG/ML (ref 0–14.9)
TSH SERPL DL<=0.005 MIU/L-ACNC: 1.47 UIU/ML (ref 0.34–5.6)
URN SPEC COLLECT METH UR: ABNORMAL
UROBILINOGEN UR STRIP-ACNC: NEGATIVE EU/DL
WBC # BLD AUTO: 7.42 K/UL (ref 3.9–12.7)
WBC #/AREA URNS HPF: 1 /HPF (ref 0–5)

## 2023-11-20 PROCEDURE — 93010 EKG 12-LEAD: ICD-10-PCS | Mod: ,,, | Performed by: INTERNAL MEDICINE

## 2023-11-20 PROCEDURE — 93005 ELECTROCARDIOGRAM TRACING: CPT | Performed by: INTERNAL MEDICINE

## 2023-11-20 PROCEDURE — 85025 COMPLETE CBC W/AUTO DIFF WBC: CPT | Performed by: EMERGENCY MEDICINE

## 2023-11-20 PROCEDURE — 99285 EMERGENCY DEPT VISIT HI MDM: CPT | Mod: 25

## 2023-11-20 PROCEDURE — 93010 ELECTROCARDIOGRAM REPORT: CPT | Mod: ,,, | Performed by: INTERNAL MEDICINE

## 2023-11-20 PROCEDURE — 81001 URINALYSIS AUTO W/SCOPE: CPT | Performed by: EMERGENCY MEDICINE

## 2023-11-20 PROCEDURE — 63600175 PHARM REV CODE 636 W HCPCS: Performed by: STUDENT IN AN ORGANIZED HEALTH CARE EDUCATION/TRAINING PROGRAM

## 2023-11-20 PROCEDURE — 86140 C-REACTIVE PROTEIN: CPT | Performed by: STUDENT IN AN ORGANIZED HEALTH CARE EDUCATION/TRAINING PROGRAM

## 2023-11-20 PROCEDURE — 96360 HYDRATION IV INFUSION INIT: CPT

## 2023-11-20 PROCEDURE — 96361 HYDRATE IV INFUSION ADD-ON: CPT

## 2023-11-20 PROCEDURE — 85610 PROTHROMBIN TIME: CPT | Performed by: EMERGENCY MEDICINE

## 2023-11-20 PROCEDURE — 25000003 PHARM REV CODE 250: Performed by: EMERGENCY MEDICINE

## 2023-11-20 PROCEDURE — 87040 BLOOD CULTURE FOR BACTERIA: CPT | Mod: 59 | Performed by: EMERGENCY MEDICINE

## 2023-11-20 PROCEDURE — A4216 STERILE WATER/SALINE, 10 ML: HCPCS | Performed by: STUDENT IN AN ORGANIZED HEALTH CARE EDUCATION/TRAINING PROGRAM

## 2023-11-20 PROCEDURE — 84145 PROCALCITONIN (PCT): CPT | Performed by: EMERGENCY MEDICINE

## 2023-11-20 PROCEDURE — 25000003 PHARM REV CODE 250: Performed by: STUDENT IN AN ORGANIZED HEALTH CARE EDUCATION/TRAINING PROGRAM

## 2023-11-20 PROCEDURE — 84443 ASSAY THYROID STIM HORMONE: CPT | Performed by: EMERGENCY MEDICINE

## 2023-11-20 PROCEDURE — 83605 ASSAY OF LACTIC ACID: CPT | Performed by: EMERGENCY MEDICINE

## 2023-11-20 PROCEDURE — 82962 GLUCOSE BLOOD TEST: CPT

## 2023-11-20 PROCEDURE — 12000002 HC ACUTE/MED SURGE SEMI-PRIVATE ROOM

## 2023-11-20 PROCEDURE — 80053 COMPREHEN METABOLIC PANEL: CPT | Performed by: EMERGENCY MEDICINE

## 2023-11-20 PROCEDURE — 85730 THROMBOPLASTIN TIME PARTIAL: CPT | Performed by: EMERGENCY MEDICINE

## 2023-11-20 PROCEDURE — 27100171 HC OXYGEN HIGH FLOW UP TO 24 HOURS

## 2023-11-20 PROCEDURE — 99900035 HC TECH TIME PER 15 MIN (STAT)

## 2023-11-20 PROCEDURE — 87502 INFLUENZA DNA AMP PROBE: CPT | Performed by: EMERGENCY MEDICINE

## 2023-11-20 PROCEDURE — 94640 AIRWAY INHALATION TREATMENT: CPT

## 2023-11-20 PROCEDURE — U0002 COVID-19 LAB TEST NON-CDC: HCPCS | Performed by: EMERGENCY MEDICINE

## 2023-11-20 PROCEDURE — 83880 ASSAY OF NATRIURETIC PEPTIDE: CPT | Performed by: EMERGENCY MEDICINE

## 2023-11-20 PROCEDURE — 84484 ASSAY OF TROPONIN QUANT: CPT | Mod: 91 | Performed by: STUDENT IN AN ORGANIZED HEALTH CARE EDUCATION/TRAINING PROGRAM

## 2023-11-20 PROCEDURE — 83735 ASSAY OF MAGNESIUM: CPT | Performed by: EMERGENCY MEDICINE

## 2023-11-20 PROCEDURE — 25000242 PHARM REV CODE 250 ALT 637 W/ HCPCS: Performed by: STUDENT IN AN ORGANIZED HEALTH CARE EDUCATION/TRAINING PROGRAM

## 2023-11-20 PROCEDURE — 94799 UNLISTED PULMONARY SVC/PX: CPT

## 2023-11-20 PROCEDURE — 36415 COLL VENOUS BLD VENIPUNCTURE: CPT | Performed by: EMERGENCY MEDICINE

## 2023-11-20 PROCEDURE — 84484 ASSAY OF TROPONIN QUANT: CPT | Performed by: EMERGENCY MEDICINE

## 2023-11-20 PROCEDURE — 94761 N-INVAS EAR/PLS OXIMETRY MLT: CPT

## 2023-11-20 PROCEDURE — 99900031 HC PATIENT EDUCATION (STAT)

## 2023-11-20 PROCEDURE — 85379 FIBRIN DEGRADATION QUANT: CPT | Performed by: STUDENT IN AN ORGANIZED HEALTH CARE EDUCATION/TRAINING PROGRAM

## 2023-11-20 PROCEDURE — 27000221 HC OXYGEN, UP TO 24 HOURS

## 2023-11-20 RX ORDER — ACETAMINOPHEN 325 MG/1
650 TABLET ORAL
Status: COMPLETED | OUTPATIENT
Start: 2023-11-20 | End: 2023-11-20

## 2023-11-20 RX ORDER — CLONAZEPAM 1 MG/1
1 TABLET ORAL 2 TIMES DAILY PRN
Status: DISCONTINUED | OUTPATIENT
Start: 2023-11-20 | End: 2023-11-22 | Stop reason: HOSPADM

## 2023-11-20 RX ORDER — ALBUTEROL SULFATE 0.83 MG/ML
2.5 SOLUTION RESPIRATORY (INHALATION) EVERY 6 HOURS
Status: DISCONTINUED | OUTPATIENT
Start: 2023-11-20 | End: 2023-11-22 | Stop reason: HOSPADM

## 2023-11-20 RX ORDER — IBUPROFEN 200 MG
24 TABLET ORAL
Status: DISCONTINUED | OUTPATIENT
Start: 2023-11-20 | End: 2023-11-22 | Stop reason: HOSPADM

## 2023-11-20 RX ORDER — DEXAMETHASONE SODIUM PHOSPHATE 4 MG/ML
6 INJECTION, SOLUTION INTRA-ARTICULAR; INTRALESIONAL; INTRAMUSCULAR; INTRAVENOUS; SOFT TISSUE EVERY 24 HOURS
Status: DISCONTINUED | OUTPATIENT
Start: 2023-11-21 | End: 2023-11-22 | Stop reason: HOSPADM

## 2023-11-20 RX ORDER — SODIUM CHLORIDE 0.9 % (FLUSH) 0.9 %
10 SYRINGE (ML) INJECTION EVERY 12 HOURS
Status: DISCONTINUED | OUTPATIENT
Start: 2023-11-20 | End: 2023-11-22 | Stop reason: HOSPADM

## 2023-11-20 RX ORDER — ONDANSETRON 2 MG/ML
4 INJECTION INTRAMUSCULAR; INTRAVENOUS EVERY 8 HOURS PRN
Status: DISCONTINUED | OUTPATIENT
Start: 2023-11-20 | End: 2023-11-22 | Stop reason: HOSPADM

## 2023-11-20 RX ORDER — DOXEPIN HYDROCHLORIDE 25 MG/1
25 CAPSULE ORAL NIGHTLY
COMMUNITY
Start: 2023-07-18

## 2023-11-20 RX ORDER — GLUCAGON 1 MG
1 KIT INJECTION
Status: DISCONTINUED | OUTPATIENT
Start: 2023-11-20 | End: 2023-11-22 | Stop reason: HOSPADM

## 2023-11-20 RX ORDER — ALBUTEROL SULFATE 90 UG/1
2 AEROSOL, METERED RESPIRATORY (INHALATION) EVERY 6 HOURS
Status: DISCONTINUED | OUTPATIENT
Start: 2023-11-20 | End: 2023-11-20

## 2023-11-20 RX ORDER — BENZONATATE 100 MG/1
200 CAPSULE ORAL 3 TIMES DAILY PRN
Status: DISCONTINUED | OUTPATIENT
Start: 2023-11-20 | End: 2023-11-22 | Stop reason: HOSPADM

## 2023-11-20 RX ORDER — ESCITALOPRAM OXALATE 10 MG/1
20 TABLET ORAL EVERY MORNING
Status: DISCONTINUED | OUTPATIENT
Start: 2023-11-20 | End: 2023-11-22 | Stop reason: HOSPADM

## 2023-11-20 RX ORDER — TRAMADOL HYDROCHLORIDE 50 MG/1
50 TABLET ORAL EVERY 6 HOURS PRN
Status: DISCONTINUED | OUTPATIENT
Start: 2023-11-20 | End: 2023-11-22 | Stop reason: HOSPADM

## 2023-11-20 RX ORDER — MAGNESIUM SULFATE HEPTAHYDRATE 40 MG/ML
2 INJECTION, SOLUTION INTRAVENOUS ONCE
Status: COMPLETED | OUTPATIENT
Start: 2023-11-20 | End: 2023-11-20

## 2023-11-20 RX ORDER — TRAZODONE HYDROCHLORIDE 150 MG/1
150 TABLET ORAL NIGHTLY
COMMUNITY

## 2023-11-20 RX ORDER — LOPERAMIDE HYDROCHLORIDE 2 MG/1
2 CAPSULE ORAL DAILY PRN
Status: DISCONTINUED | OUTPATIENT
Start: 2023-11-20 | End: 2023-11-22 | Stop reason: HOSPADM

## 2023-11-20 RX ORDER — DEXAMETHASONE SODIUM PHOSPHATE 4 MG/ML
10 INJECTION, SOLUTION INTRA-ARTICULAR; INTRALESIONAL; INTRAMUSCULAR; INTRAVENOUS; SOFT TISSUE ONCE
Status: COMPLETED | OUTPATIENT
Start: 2023-11-20 | End: 2023-11-20

## 2023-11-20 RX ORDER — DOXEPIN HYDROCHLORIDE 25 MG/1
25 CAPSULE ORAL NIGHTLY
Status: DISCONTINUED | OUTPATIENT
Start: 2023-11-20 | End: 2023-11-22 | Stop reason: HOSPADM

## 2023-11-20 RX ORDER — SODIUM CHLORIDE 9 MG/ML
INJECTION, SOLUTION INTRAVENOUS CONTINUOUS
Status: DISCONTINUED | OUTPATIENT
Start: 2023-11-20 | End: 2023-11-21

## 2023-11-20 RX ORDER — ACETAMINOPHEN 325 MG/1
650 TABLET ORAL EVERY 4 HOURS PRN
Status: DISCONTINUED | OUTPATIENT
Start: 2023-11-20 | End: 2023-11-22 | Stop reason: HOSPADM

## 2023-11-20 RX ORDER — INSULIN ASPART 100 [IU]/ML
0-5 INJECTION, SOLUTION INTRAVENOUS; SUBCUTANEOUS
Status: DISCONTINUED | OUTPATIENT
Start: 2023-11-20 | End: 2023-11-22 | Stop reason: HOSPADM

## 2023-11-20 RX ORDER — IBUPROFEN 200 MG
16 TABLET ORAL
Status: DISCONTINUED | OUTPATIENT
Start: 2023-11-20 | End: 2023-11-22 | Stop reason: HOSPADM

## 2023-11-20 RX ORDER — ENOXAPARIN SODIUM 100 MG/ML
40 INJECTION SUBCUTANEOUS EVERY 24 HOURS
Status: DISCONTINUED | OUTPATIENT
Start: 2023-11-20 | End: 2023-11-22 | Stop reason: HOSPADM

## 2023-11-20 RX ORDER — FLUOROURACIL 50 MG/G
1 CREAM TOPICAL NIGHTLY
COMMUNITY
Start: 2023-07-18 | End: 2023-11-20

## 2023-11-20 RX ORDER — CLONAZEPAM 2 MG/1
1-2 TABLET ORAL NIGHTLY
COMMUNITY

## 2023-11-20 RX ORDER — TALC
6 POWDER (GRAM) TOPICAL NIGHTLY PRN
Status: DISCONTINUED | OUTPATIENT
Start: 2023-11-20 | End: 2023-11-22 | Stop reason: HOSPADM

## 2023-11-20 RX ORDER — CHOLECALCIFEROL (VITAMIN D3) 50 MCG
2000 TABLET ORAL DAILY
Status: DISCONTINUED | OUTPATIENT
Start: 2023-11-20 | End: 2023-11-22 | Stop reason: HOSPADM

## 2023-11-20 RX ORDER — HYDRALAZINE HYDROCHLORIDE 20 MG/ML
10 INJECTION INTRAMUSCULAR; INTRAVENOUS EVERY 4 HOURS PRN
Status: DISCONTINUED | OUTPATIENT
Start: 2023-11-20 | End: 2023-11-22 | Stop reason: HOSPADM

## 2023-11-20 RX ORDER — FAMOTIDINE 10 MG/ML
20 INJECTION INTRAVENOUS DAILY
Status: DISCONTINUED | OUTPATIENT
Start: 2023-11-20 | End: 2023-11-22 | Stop reason: HOSPADM

## 2023-11-20 RX ORDER — MUPIROCIN 20 MG/G
OINTMENT TOPICAL 2 TIMES DAILY
Status: DISCONTINUED | OUTPATIENT
Start: 2023-11-20 | End: 2023-11-22 | Stop reason: HOSPADM

## 2023-11-20 RX ADMIN — FAMOTIDINE 20 MG: 10 INJECTION, SOLUTION INTRAVENOUS at 11:11

## 2023-11-20 RX ADMIN — ALBUTEROL SULFATE 2.5 MG: 2.5 SOLUTION RESPIRATORY (INHALATION) at 10:11

## 2023-11-20 RX ADMIN — MAGNESIUM SULFATE 2 G: 2 INJECTION INTRAVENOUS at 12:11

## 2023-11-20 RX ADMIN — LAMOTRIGINE 150 MG: 100 TABLET ORAL at 08:11

## 2023-11-20 RX ADMIN — ENOXAPARIN SODIUM 40 MG: 40 INJECTION SUBCUTANEOUS at 06:11

## 2023-11-20 RX ADMIN — SODIUM CHLORIDE, PRESERVATIVE FREE 10 ML: 5 INJECTION INTRAVENOUS at 10:11

## 2023-11-20 RX ADMIN — ALBUTEROL SULFATE 2.5 MG: 2.5 SOLUTION RESPIRATORY (INHALATION) at 11:11

## 2023-11-20 RX ADMIN — SODIUM CHLORIDE, PRESERVATIVE FREE 10 ML: 5 INJECTION INTRAVENOUS at 09:11

## 2023-11-20 RX ADMIN — CLONAZEPAM 1 MG: 1 TABLET ORAL at 09:11

## 2023-11-20 RX ADMIN — DOXYCYCLINE 100 MG: 100 INJECTION, POWDER, LYOPHILIZED, FOR SOLUTION INTRAVENOUS at 11:11

## 2023-11-20 RX ADMIN — SODIUM CHLORIDE: 0.9 INJECTION, SOLUTION INTRAVENOUS at 08:11

## 2023-11-20 RX ADMIN — ACETAMINOPHEN 650 MG: 325 TABLET ORAL at 08:11

## 2023-11-20 RX ADMIN — MUPIROCIN 1 G: 20 OINTMENT TOPICAL at 10:11

## 2023-11-20 RX ADMIN — MUPIROCIN 1 G: 20 OINTMENT TOPICAL at 08:11

## 2023-11-20 RX ADMIN — ESCITALOPRAM OXALATE 20 MG: 10 TABLET ORAL at 10:11

## 2023-11-20 RX ADMIN — SODIUM CHLORIDE 1000 ML: 0.9 INJECTION, SOLUTION INTRAVENOUS at 08:11

## 2023-11-20 RX ADMIN — ALBUTEROL SULFATE 2.5 MG: 2.5 SOLUTION RESPIRATORY (INHALATION) at 07:11

## 2023-11-20 RX ADMIN — INSULIN ASPART 1 UNITS: 100 INJECTION, SOLUTION INTRAVENOUS; SUBCUTANEOUS at 11:11

## 2023-11-20 RX ADMIN — SODIUM CHLORIDE: 0.9 INJECTION, SOLUTION INTRAVENOUS at 12:11

## 2023-11-20 RX ADMIN — LOPERAMIDE HYDROCHLORIDE 2 MG: 2 CAPSULE ORAL at 08:11

## 2023-11-20 RX ADMIN — DEXAMETHASONE SODIUM PHOSPHATE 10 MG: 4 INJECTION, SOLUTION INTRA-ARTICULAR; INTRALESIONAL; INTRAMUSCULAR; INTRAVENOUS; SOFT TISSUE at 10:11

## 2023-11-20 RX ADMIN — THERA TABS 1 TABLET: TAB at 10:11

## 2023-11-20 RX ADMIN — LAMOTRIGINE 150 MG: 100 TABLET ORAL at 12:11

## 2023-11-20 RX ADMIN — DOXEPIN HYDROCHLORIDE 25 MG: 25 CAPSULE ORAL at 08:11

## 2023-11-20 NOTE — ED PROVIDER NOTES
Encounter Date: 11/20/2023       History     Chief Complaint   Patient presents with    Generalized Body Aches    Chills    Cough    Fatigue     Patient with no significant cardiac or pulmonary history.  Patient 4 day history of generalized weakness.  Patient reports she has been bumping into objects at her house because she is not walking in a straight line.  Decreased p.o. intake.  Patient ingested a proximally 2 spoonfuls of soup in last 24 hours.  Patient with subjective fever and chills.  No sick contacts.  Patient arrives with EMS.  Patient noted to be hypoxic with triage nurse.      Review of patient's allergies indicates:   Allergen Reactions    Pcn [penicillins] Hives    Promethazine hcl Other (See Comments)     Gets muscle spasams    Reglan [metoclopramide hcl] Other (See Comments)     Gets muscle spasams     Past Medical History:   Diagnosis Date    Depression     Diabetes mellitus     borderline    Kidney stone      Past Surgical History:   Procedure Laterality Date    CYSTOSCOPY N/A 9/21/2022    Procedure: CYSTOSCOPY;  Surgeon: Pepper Salas MD;  Location: [facility] OR;  Service: Urology;  Laterality: N/A;    EXTRACORPOREAL SHOCK WAVE LITHOTRIPSY Left 9/21/2022    Procedure: LITHOTRIPSY, ESWL;  Surgeon: Pepper Salas MD;  Location: [facility] OR;  Service: Urology;  Laterality: Left;    RETROGRADE PYELOGRAPHY Left 9/21/2022    Procedure: PYELOGRAM, RETROGRADE;  Surgeon: Pepper Salas MD;  Location: [facility] OR;  Service: Urology;  Laterality: Left;    TONSILLECTOMY, ADENOIDECTOMY      TUBAL LIGATION      WISDOM TOOTH EXTRACTION       Family History   Problem Relation Age of Onset    Coronary artery disease Father     Urolithiasis Mother     Diabetes Maternal Grandmother     Thyroid disease Sister     Prostate cancer Neg Hx     Kidney cancer Neg Hx      Social History     Tobacco Use    Smoking status: Never    Smokeless tobacco: Never   Substance Use Topics    Alcohol use: No    Drug  use: No     Review of Systems   Constitutional:  Negative for chills and fever.   HENT:  Negative for congestion.    Eyes:  Negative for visual disturbance.   Respiratory:  Negative for shortness of breath.    Cardiovascular:  Negative for chest pain and palpitations.   Gastrointestinal:  Negative for abdominal pain and vomiting.   Genitourinary:  Negative for dysuria.   Musculoskeletal:  Negative for joint swelling.   Neurological:  Positive for weakness. Negative for seizures, syncope and headaches.   Psychiatric/Behavioral:  Negative for confusion.        Physical Exam     Initial Vitals [11/20/23 0744]   BP Pulse Resp Temp SpO2   138/67 88 (!) 22 99.7 °F (37.6 °C) (!) 88 %      MAP       --         Physical Exam    Nursing note and vitals reviewed.  Constitutional: She is not diaphoretic. No distress.   HENT:   Head: Normocephalic and atraumatic.   Dry mucous membranes   Eyes: Conjunctivae and EOM are normal.   Neck:   Normal range of motion.  Cardiovascular:  Normal rate.           Pulmonary/Chest: Breath sounds normal.   Abdominal: Abdomen is soft. Bowel sounds are normal. There is no abdominal tenderness.   Musculoskeletal:         General: Normal range of motion.      Cervical back: Normal range of motion.     Neurological: She is alert. She has normal strength. No cranial nerve deficit or sensory deficit.   No gross deficits   Skin: No rash noted.   Psychiatric: She has a normal mood and affect.         ED Course   Procedures  Labs Reviewed   TROPONIN I HIGH SENSITIVITY - Abnormal; Notable for the following components:       Result Value    Troponin I High Sensitivity 42.4 (*)     All other components within normal limits   URINALYSIS, REFLEX TO URINE CULTURE - Abnormal; Notable for the following components:    Protein, UA Trace (*)     Ketones, UA 2+ (*)     Leukocytes, UA Trace (*)     All other components within normal limits    Narrative:     Specimen Source->Urine   CBC W/ AUTO DIFFERENTIAL - Abnormal;  Notable for the following components:    RDW 14.6 (*)     All other components within normal limits   SARS-COV-2 RNA AMPLIFICATION, QUAL - Abnormal; Notable for the following components:    SARS-CoV-2 RNA, Amplification, Qual Positive (*)     All other components within normal limits   URINALYSIS MICROSCOPIC - Abnormal; Notable for the following components:    Hyaline Casts, UA 5 (*)     All other components within normal limits    Narrative:     Specimen Source->Urine   CULTURE, BLOOD   CULTURE, BLOOD   PROTIME-INR   APTT   TSH   MAGNESIUM   LACTIC ACID, PLASMA   PROCALCITONIN   COMPREHENSIVE METABOLIC PANEL   INFLUENZA A AND B ANTIGEN   B-TYPE NATRIURETIC PEPTIDE   POCT GLUCOSE   POCT GLUCOSE MONITORING CONTINUOUS        ECG Results              EKG 12-lead (In process)  Result time 11/20/23 08:34:39      In process by Interface, Lab In [facility] (11/20/23 08:34:39)                   Narrative:    Test Reason : R09.02,    Vent. Rate : 080 BPM     Atrial Rate : 080 BPM     P-R Int : 110 ms          QRS Dur : 080 ms      QT Int : 368 ms       P-R-T Axes : 012 032 053 degrees     QTc Int : 424 ms    Sinus rhythm with short AK  Possible Anterior infarct ,age undetermined  Abnormal ECG  When compared with ECG of 13-SEP-2022 10:19,  Borderline criteria for Anterior infarct are now Present  T wave inversion no longer evident in Lateral leads    Referred By: [email or web address]   SELF           Confirmed By:                                   Imaging Results              CT Head Without Contrast (Final result)  Result time 11/20/23 09:38:27      Final result by Nash Lehman MD (11/20/23 09:38:27)                   Narrative:    CMS MANDATED QUALITY DATA-CT RADIATION DOSE-436  All CT scans at this facility dose modulation, iterative reconstruction, and or weight-based dosing when appropriate to reduce radiation dose to as low as reasonably achievable.    HISTORY: Neuro deficit, acute, stroke suspected; Dizziness,  persistent/recurrent, cardiac or vascular cause suspected    FINDINGS: Comparison to head CT of 09/22/2015. There is no acute intracranial hemorrhage, with no mass effect or abnormal extra-axial fluid. Gray white differentiation is maintained, with stable generalized prominence of the cortical sulci and ventricles. The unenhanced cerebellum and brainstem are unremarkable.    There are carotid siphon vascular calcifications. There is complete opacification of the visualized left maxillary sinus, with the remaining visualized paranasal sinuses and mastoid air cells clear. There is no acute calvarial fracture.    IMPRESSION: No acute intracranial hemorrhage, mass effect, or loss of gray-white differentiation.    Electronically signed by:  Nash Lehman MD  11/20/2023 09:38 AM CST Workstation: [id number]                                     X-Ray Chest AP Portable (Final result)  Result time 11/20/23 08:17:38      Final result by Omid Negrete MD (11/20/23 08:17:38)                   Narrative:    Chest single view    CLINICAL DATA: Hypoxia, cough, chills    FINDINGS: AP view shows the heart to be within normal size limits. The mediastinum is unremarkable. The lungs are clear. No acute osseous abnormality is identified.    IMPRESSION:  1. No acute process.    Electronically signed by:  Omid Negrete MD  11/20/2023 08:17 AM CST Workstation: [id number]                                     Medications   sodium chloride 0.9% bolus 1,000 mL 1,000 mL (1,000 mLs Intravenous New Bag 11/20/23 0806)   acetaminophen tablet 650 mg (650 mg Oral Given 11/20/23 0806)     Medical Decision Making  Patient presents with weakness.  She arrives with EMS.  Patient is hypoxic with O2 saturation 88%.  Laboratory evaluation is positive for COVID.  Troponin is indeterminate at 42.  CBC and CMP are essentially unremarkable.  EKG is normal sinus rhythm.  Chest x-ray with no definite pneumonitis.  Head CT no acute pathology.  Patient  clinically appears very volume depleted and she does have ketones in the urine.  Fluid resuscitation initiated here.  Given hypoxia will begin Decadron IV.  Patient does need supplemental oxygen.  Hospitalist consulted for admission.    Amount and/or Complexity of Data Reviewed  Independent Historian: EMS     Details: EMS gives initial history  Labs: ordered. Decision-making details documented in ED Course.  Radiology: ordered. Decision-making details documented in ED Course.  ECG/medicine tests:  Decision-making details documented in ED Course.    Risk  OTC drugs.                                   Clinical Impression:  Final diagnoses:  [R09.02] Hypoxia  [U07.1] COVID-19 virus infection (Primary)          ED Disposition Condition    Admit Stable                Jabari Vickers MD  11/20/23 1525

## 2023-11-20 NOTE — ASSESSMENT & PLAN NOTE
Patient with Hypoxic Respiratory failure which is Acute.  she is not on home oxygen. Supplemental oxygen was provided and noted-      .   Signs/symptoms of respiratory failure include- tachypnea, increased work of breathing, and respiratory distress. Contributing diagnoses includes -  COVID  Labs and images were reviewed. Patient Has not had a recent ABG. Will treat underlying causes and adjust management of respiratory failure as follows- Patient on 2 L nasal cannula.  We will wean oxygen as tolerated.  Continue IV dexamethasone, doxycycline, IV fluids, and albuterol.  Contact precautions.

## 2023-11-20 NOTE — H&P
[facility] - Emergency Dept  Hospital Medicine  History & Physical    Patient Name: Amanda Pereira  MRN: [id number]  Patient Class: IP- Inpatient  Admission Date: 11/20/2023  Attending Physician: Boby Daniels MD   Primary Care Provider: Kareem Sosa MD         Patient information was obtained from patient, past medical records, and ER records.     Subjective:     Principal Problem:COVID-19    Chief Complaint:   Chief Complaint   Patient presents with    Generalized Body Aches    Chills    Cough    Fatigue        HPI: Patient is a 77-year-old female who presents with generalized body aches, cough, shortness breast, fatigue for the past 4 days.  Shortness of breath has been worsening encouraged at rest.  Patient also says she is been generally weak in his had poor p.o. intake.  Has had difficulty walking due to the weakness.  Has had subjective fevers and chills.  Patient denies any chest pain, nausea, vomiting, abdominal pain, or diarrhea.  On arrival, patient was 88% on room air.  The patient is now 2 L nasal cannula satting 96%.  CBC unremarkable.  Sodium 132, bicarb 20, creatinine 0.8.  Lactate 0.7.  COVID positive.  Chest x-ray with no acute findings.    Past Medical History:   Diagnosis Date    Depression     Diabetes mellitus     borderline    Kidney stone        Past Surgical History:   Procedure Laterality Date    CYSTOSCOPY N/A 9/21/2022    Procedure: CYSTOSCOPY;  Surgeon: Pepper Salas MD;  Location: [facility];  Service: Urology;  Laterality: N/A;    EXTRACORPOREAL SHOCK WAVE LITHOTRIPSY Left 9/21/2022    Procedure: LITHOTRIPSY, ESWL;  Surgeon: Pepper Salas MD;  Location: [facility] OR;  Service: Urology;  Laterality: Left;    RETROGRADE PYELOGRAPHY Left 9/21/2022    Procedure: PYELOGRAM, RETROGRADE;  Surgeon: Pepper Salas MD;  Location: [facility] OR;  Service: Urology;  Laterality: Left;    TONSILLECTOMY, ADENOIDECTOMY      TUBAL LIGATION      WISDOM TOOTH EXTRACTION          Review of patient's allergies indicates:   Allergen Reactions    Pcn [penicillins] Hives    Promethazine hcl Other (See Comments)     Gets muscle spasams    Reglan [metoclopramide hcl] Other (See Comments)     Gets muscle spasams       No current facility-administered medications on file prior to encounter.     Current Outpatient Medications on File Prior to Encounter   Medication Sig    clonazePAM (KLONOPIN) 2 MG Tab Take 1-2 mg by mouth every evening.    doxepin (SINEQUAN) 25 MG capsule Take 25 mg by mouth every evening.    EScitalopram oxalate (LEXAPRO) 20 MG tablet Take 20 mg by mouth every morning.    lamoTRIgine (LAMICTAL) 150 MG Tab Take 150 mg by mouth 2 (two) times daily.    loperamide HCl (IMODIUM A-D ORAL) Take 2 mg by mouth daily as needed.    traZODone (DESYREL) 150 MG tablet Take 150 mg by mouth every evening.    azelastine (ASTELIN) 137 mcg (0.1 %) nasal spray 1 spray (137 mcg total) by Nasal route 2 (two) times daily. (Patient not taking: Reported on 11/20/2023)    labetaloL (NORMODYNE) 200 MG tablet Take 200 mg by mouth every evening.    mirabegron (MYRBETRIQ) 50 mg Tb24 Take 1 tablet (50 mg total) by mouth every morning. (Patient not taking: Reported on 11/20/2023)    pantoprazole (PROTONIX) 40 MG tablet Take 1 tablet (40 mg total) by mouth once daily. (Patient not taking: Reported on 11/20/2023)    tamsulosin (FLOMAX) 0.4 mg Cap Take 1 capsule (0.4 mg total) by mouth every evening. Take nightly for stent/stone (Patient not taking: Reported on 11/20/2023)    traMADoL (ULTRAM) 50 mg tablet Take 1 tablet (50 mg total) by mouth every 6 (six) hours as needed for Pain. (Patient not taking: Reported on 11/20/2023)    [DISCONTINUED] aluminum-magnesium hydroxide-simethicone (MAALOX) 200-200-20 mg/5 mL Susp Take 30 mLs by mouth.    [DISCONTINUED] doxycycline (MONODOX) 100 MG capsule Take 1 capsule (100 mg total) by mouth 2 (two) times daily.    [DISCONTINUED] estradioL (ESTRACE) 0.01 % (0.1 mg/gram)  vaginal cream Place 1 g vaginally once daily. Apply pea sized amount up to second knuckle. Apply nightly for 2 weeks then every other night.    [DISCONTINUED] fluorouraciL (EFUDEX) 5 % cream Apply 1 g topically every evening.     Family History       Problem Relation (Age of Onset)    Coronary artery disease Father    Diabetes Maternal Grandmother    Thyroid disease Sister    Urolithiasis Mother          Tobacco Use    Smoking status: Never    Smokeless tobacco: Never   Substance and Sexual Activity    Alcohol use: No    Drug use: No    Sexual activity: Not Currently     Review of Systems   Constitutional:  Positive for activity change, appetite change, chills, fatigue and fever.   HENT: Negative.     Eyes: Negative.    Respiratory:  Positive for cough and shortness of breath. Negative for wheezing and stridor.    Cardiovascular: Negative.  Negative for chest pain, palpitations and leg swelling.   Gastrointestinal: Negative.    Endocrine: Negative.    Genitourinary: Negative.    Musculoskeletal: Negative.    Skin: Negative.    Neurological:  Positive for weakness and headaches. Negative for dizziness, seizures, facial asymmetry, speech difficulty, light-headedness and numbness.   Hematological: Negative.    Psychiatric/Behavioral: Negative.       Objective:     Vital Signs (Most Recent):  Temp: 99.7 °F (37.6 °C) (11/20/23 0744)  Pulse: 78 (11/20/23 0834)  Resp: 20 (11/20/23 0834)  BP: 121/62 (11/20/23 0834)  SpO2: 98 % (11/20/23 0834) Vital Signs (24h Range):  Temp:  [99.7 °F (37.6 °C)] 99.7 °F (37.6 °C)  Pulse:  [78-88] 78  Resp:  [20-22] 20  SpO2:  [88 %-98 %] 98 %  BP: (119-138)/(62-67) 121/62     Weight: 59 kg (130 lb)  Body mass index is 23.78 kg/m².     Physical Exam  Vitals and nursing note reviewed.   Constitutional:       General: She is not in acute distress.     Comments: Weak appearing, on 2 L nasal cannula   HENT:      Head: Normocephalic and atraumatic.      Nose: Nose normal.   Eyes:      Extraocular  Movements: Extraocular movements intact.      Conjunctiva/sclera: Conjunctivae normal.   Cardiovascular:      Rate and Rhythm: Normal rate and regular rhythm.   Pulmonary:      Effort: Pulmonary effort is normal. No respiratory distress.      Breath sounds: Normal breath sounds. No stridor. No wheezing or rales.   Abdominal:      General: Bowel sounds are normal. There is no distension.      Palpations: Abdomen is soft.      Tenderness: There is no abdominal tenderness.   Musculoskeletal:         General: No swelling or tenderness. Normal range of motion.      Cervical back: Normal range of motion and neck supple.      Right lower leg: No edema.      Left lower leg: No edema.   Skin:     General: Skin is warm and dry.   Neurological:      General: No focal deficit present.      Mental Status: She is alert and oriented to person, place, and time.                Significant Labs: All pertinent labs within the past 24 hours have been reviewed.  Recent Lab Results         11/20/23  0831   11/20/23  0826   11/20/23  0805   11/20/23  0802        Influenza A, Molecular       Negative       Influenza B, Molecular       Negative       Procalcitonin     0.106  Comment: Procalcitonin Result Interpretation:    <=0.50 ng/mL  Systemic infection (sepsis) is not likely. Local bacterial infection   is   possible.    >0.50 and <= 2.00 ng/mL  Systemic infection (sepsis) is possible, but other conditions are   also known   to elevate procalcitonin.     >2.00 ng/mL  Systemic infection (sepsis) is likely unless other causes are known.    >=10.00 ng/mL  Important systemic inflammatory response, almost exclusively due to   severe   bacterial sepsis or septic shock.           Albumin     3.6         ALP     85         ALT     24         Anion Gap     11         Appearance, UA Clear             aPTT     31.4  Comment: Refer to local heparin nomogram for intensity/dose specific   therapeutic   range.           AST     49         Bacteria, UA  None             Baso #     0.07         Basophil %     0.9         Bilirubin (UA) Negative             BILIRUBIN TOTAL     0.3  Comment: For infants and newborns, interpretation of results should be based  on gestational age, weight and in agreement with clinical  observations.    Premature Infant recommended reference ranges:  Up to 24 hours.............<8.0 mg/dL  Up to 48 hours............<12.0 mg/dL  3-5 days..................<15.0 mg/dL  6-29 days.................<15.0 mg/dL           BUN     13         Calcium     7.7         Chloride     101         CO2     20         Color, UA Yellow             Creatinine     0.8         Differential Method     Automated         eGFR     >60.0         Eos #     0.3         Eosinophil %     4.0         Flu A & B Source       Nasal swab       Glucose     94         Glucose, UA Negative             Gran # (ANC)     4.0         Gran %     53.5         Hematocrit     43.6         Hemoglobin     14.0         Hyaline Casts, UA 5             Immature Grans (Abs)     0.01  Comment: Mild elevation in immature granulocytes is non specific and   can be seen in a variety of conditions including stress response,   acute inflammation, trauma and pregnancy. Correlation with other   laboratory and clinical findings is essential.           Immature Granulocytes     0.1         INR     1.0  Comment: Coumadin Therapy:  2.0 - 3.0 for INR for all indicators except mechanical heart valves  and antiphospholipid syndromes which should use 2.5 - 3.5.           Ketones, UA 2+             Lactate, Jamshid     0.7  Comment: Falsely low lactic acid results can be found in samples   containing >=13.0 mg/dL total bilirubin and/or >=3.5 mg/dL   direct bilirubin.           Leukocytes, UA Trace             Lymph #     2.1         Lymph %     27.9         Magnesium      1.8         MCH     29.8         MCHC     32.1         MCV     93         Microscopic Comment SEE COMMENT  Comment: Other formed elements not  mentioned in the report are not   present in the microscopic examination.                Mono #     1.0         Mono %     13.6         MPV     10.4         NITRITE UA Negative             nRBC     0         Occult Blood UA Negative             pH, UA 6.0             Platelet Count     207         POC Glucose   87           Potassium     3.6         PROTEIN TOTAL     6.5         Protein, UA Trace  Comment: Recommend a 24 hour urine protein or a urine   protein/creatinine ratio if globulin induced proteinuria is  clinically suspected.               Protime     10.8         RBC     4.70         RBC, UA 1             RDW     14.6         SARS-CoV-2 RNA, Amplification, Qual       Positive  Comment: This test utilizes isothermal nucleic acid amplification technology   to   detect the SARS-CoV-2 RdRp nucleic acid segment. The analytical   sensitivity   (limit of detection) is 500 copies/swab.     A POSITIVE result is indicative of the presence of SARS-CoV-2 RNA;   clinical   correlation with patient history and other diagnostic information is   necessary to determine patient infection status.    A NEGATIVE result means that SARS-CoV-2 nucleic acids are not present   above   the limit of detection. A NEGATIVE result should be treated as   presumptive.   It does not rule out the possibility of COVID-19 and should not be   the sole   basis for treatment decisions. If COVID-19 is strongly suspected   based on   clinical and exposure history, re-testing using an alternate   molecular assay   should be considered.     This test is only for use under the Food and Drug Administration s   Emergency   Use Authorization (EUA).     Commercial kits are provided by [employer]. Performance   characteristics of the EUA have been independently verified by   [facility]  _________________________________________________________________   The authorized Fact Sheet for Healthcare Providers and the  authorized   Fact   Sheet for Patients of the ID NOW COVID-19 are available on the FDA   website:   https://www.fda.gov/media/196674/download   https://www.fda.gov/media/745027/download         Sodium     132         Specific Eva, UA 1.010             Specimen UA Urine, Clean Catch             Squam Epithel, UA 2             Troponin I High Sensitivity     42.4  Comment: Troponin results differ between methods. Do not use   results between Troponin methods interchangeably.    Alkaline Phospatase levels above 400 U/L may   cause false positive results.    Access hsTnI should not be used for patients taking   Asfotase kelsy (Strensiq).           TSH     1.472         UROBILINOGEN UA Negative             WBC, UA 1             WBC     7.42                 Significant Imaging: I have reviewed all pertinent imaging results/findings within the past 24 hours.  Assessment/Plan:     * COVID-19  Patient on 2 L nasal cannula.  We will wean oxygen as tolerated.  Continue IV dexamethasone, doxycycline, IV fluids, and albuterol.  Contact precautions.    Acute hypoxemic respiratory failure  Patient with Hypoxic Respiratory failure which is Acute.  she is not on home oxygen. Supplemental oxygen was provided and noted-      .   Signs/symptoms of respiratory failure include- tachypnea, increased work of breathing, and respiratory distress. Contributing diagnoses includes -  COVID  Labs and images were reviewed. Patient Has not had a recent ABG. Will treat underlying causes and adjust management of respiratory failure as follows- Patient on 2 L nasal cannula.  We will wean oxygen as tolerated.  Continue IV dexamethasone, doxycycline, IV fluids, and albuterol.  Contact precautions.    GERD (gastroesophageal reflux disease)  Continue Pepcid      Depression  Continue home medications          VTE Risk Mitigation (From admission, onward)           Ordered     enoxaparin injection 40 mg  Daily         11/20/23 1011     IP VTE HIGH RISK  PATIENT  Once         11/20/23 1011     Place sequential compression device  Until discontinued         11/20/23 1011                       Boby Daniels MD  Department of Hospital Medicine  [facility] - Emergency Dept

## 2023-11-20 NOTE — ASSESSMENT & PLAN NOTE
Patient on 2 L nasal cannula.  We will wean oxygen as tolerated.  Continue IV dexamethasone, doxycycline, IV fluids, and albuterol.  Contact precautions.

## 2023-11-20 NOTE — SUBJECTIVE & OBJECTIVE
Past Medical History:   Diagnosis Date    Depression     Diabetes mellitus     borderline    Kidney stone        Past Surgical History:   Procedure Laterality Date    CYSTOSCOPY N/A 9/21/2022    Procedure: CYSTOSCOPY;  Surgeon: Pepper Salas MD;  Location: [facility] OR;  Service: Urology;  Laterality: N/A;    EXTRACORPOREAL SHOCK WAVE LITHOTRIPSY Left 9/21/2022    Procedure: LITHOTRIPSY, ESWL;  Surgeon: Pepper Salas MD;  Location: [facility] OR;  Service: Urology;  Laterality: Left;    RETROGRADE PYELOGRAPHY Left 9/21/2022    Procedure: PYELOGRAM, RETROGRADE;  Surgeon: Pepper Salas MD;  Location: [facility] OR;  Service: Urology;  Laterality: Left;    TONSILLECTOMY, ADENOIDECTOMY      TUBAL LIGATION      WISDOM TOOTH EXTRACTION         Review of patient's allergies indicates:   Allergen Reactions    Pcn [penicillins] Hives    Promethazine hcl Other (See Comments)     Gets muscle spasams    Reglan [metoclopramide hcl] Other (See Comments)     Gets muscle spasams       No current facility-administered medications on file prior to encounter.     Current Outpatient Medications on File Prior to Encounter   Medication Sig    clonazePAM (KLONOPIN) 2 MG Tab Take 1-2 mg by mouth every evening.    doxepin (SINEQUAN) 25 MG capsule Take 25 mg by mouth every evening.    EScitalopram oxalate (LEXAPRO) 20 MG tablet Take 20 mg by mouth every morning.    lamoTRIgine (LAMICTAL) 150 MG Tab Take 150 mg by mouth 2 (two) times daily.    loperamide HCl (IMODIUM A-D ORAL) Take 2 mg by mouth daily as needed.    traZODone (DESYREL) 150 MG tablet Take 150 mg by mouth every evening.    azelastine (ASTELIN) 137 mcg (0.1 %) nasal spray 1 spray (137 mcg total) by Nasal route 2 (two) times daily. (Patient not taking: Reported on 11/20/2023)    labetaloL (NORMODYNE) 200 MG tablet Take 200 mg by mouth every evening.    mirabegron (MYRBETRIQ) 50 mg Tb24 Take 1 tablet (50 mg total) by mouth every morning. (Patient not taking:  Reported on 11/20/2023)    pantoprazole (PROTONIX) 40 MG tablet Take 1 tablet (40 mg total) by mouth once daily. (Patient not taking: Reported on 11/20/2023)    tamsulosin (FLOMAX) 0.4 mg Cap Take 1 capsule (0.4 mg total) by mouth every evening. Take nightly for stent/stone (Patient not taking: Reported on 11/20/2023)    traMADoL (ULTRAM) 50 mg tablet Take 1 tablet (50 mg total) by mouth every 6 (six) hours as needed for Pain. (Patient not taking: Reported on 11/20/2023)    [DISCONTINUED] aluminum-magnesium hydroxide-simethicone (MAALOX) 200-200-20 mg/5 mL Susp Take 30 mLs by mouth.    [DISCONTINUED] doxycycline (MONODOX) 100 MG capsule Take 1 capsule (100 mg total) by mouth 2 (two) times daily.    [DISCONTINUED] estradioL (ESTRACE) 0.01 % (0.1 mg/gram) vaginal cream Place 1 g vaginally once daily. Apply pea sized amount up to second knuckle. Apply nightly for 2 weeks then every other night.    [DISCONTINUED] fluorouraciL (EFUDEX) 5 % cream Apply 1 g topically every evening.     Family History       Problem Relation (Age of Onset)    Coronary artery disease Father    Diabetes Maternal Grandmother    Thyroid disease Sister    Urolithiasis Mother          Tobacco Use    Smoking status: Never    Smokeless tobacco: Never   Substance and Sexual Activity    Alcohol use: No    Drug use: No    Sexual activity: Not Currently     Review of Systems   Constitutional:  Positive for activity change, appetite change, chills, fatigue and fever.   HENT: Negative.     Eyes: Negative.    Respiratory:  Positive for cough and shortness of breath. Negative for wheezing and stridor.    Cardiovascular: Negative.  Negative for chest pain, palpitations and leg swelling.   Gastrointestinal: Negative.    Endocrine: Negative.    Genitourinary: Negative.    Musculoskeletal: Negative.    Skin: Negative.    Neurological:  Positive for weakness and headaches. Negative for dizziness, seizures, facial asymmetry, speech difficulty, light-headedness  and numbness.   Hematological: Negative.    Psychiatric/Behavioral: Negative.       Objective:     Vital Signs (Most Recent):  Temp: 99.7 °F (37.6 °C) (11/20/23 0744)  Pulse: 78 (11/20/23 0834)  Resp: 20 (11/20/23 0834)  BP: 121/62 (11/20/23 0834)  SpO2: 98 % (11/20/23 0834) Vital Signs (24h Range):  Temp:  [99.7 °F (37.6 °C)] 99.7 °F (37.6 °C)  Pulse:  [78-88] 78  Resp:  [20-22] 20  SpO2:  [88 %-98 %] 98 %  BP: (119-138)/(62-67) 121/62     Weight: 59 kg (130 lb)  Body mass index is 23.78 kg/m².     Physical Exam  Vitals and nursing note reviewed.   Constitutional:       General: She is not in acute distress.     Comments: Weak appearing, on 2 L nasal cannula   HENT:      Head: Normocephalic and atraumatic.      Nose: Nose normal.   Eyes:      Extraocular Movements: Extraocular movements intact.      Conjunctiva/sclera: Conjunctivae normal.   Cardiovascular:      Rate and Rhythm: Normal rate and regular rhythm.   Pulmonary:      Effort: Pulmonary effort is normal. No respiratory distress.      Breath sounds: Normal breath sounds. No stridor. No wheezing or rales.   Abdominal:      General: Bowel sounds are normal. There is no distension.      Palpations: Abdomen is soft.      Tenderness: There is no abdominal tenderness.   Musculoskeletal:         General: No swelling or tenderness. Normal range of motion.      Cervical back: Normal range of motion and neck supple.      Right lower leg: No edema.      Left lower leg: No edema.   Skin:     General: Skin is warm and dry.   Neurological:      General: No focal deficit present.      Mental Status: She is alert and oriented to person, place, and time.                Significant Labs: All pertinent labs within the past 24 hours have been reviewed.  Recent Lab Results         11/20/23 0831   11/20/23 0826   11/20/23  0805 11/20/23  0802        Influenza A, Molecular       Negative       Influenza B, Molecular       Negative       Procalcitonin     0.106  Comment:  Procalcitonin Result Interpretation:    <=0.50 ng/mL  Systemic infection (sepsis) is not likely. Local bacterial infection   is   possible.    >0.50 and <= 2.00 ng/mL  Systemic infection (sepsis) is possible, but other conditions are   also known   to elevate procalcitonin.     >2.00 ng/mL  Systemic infection (sepsis) is likely unless other causes are known.    >=10.00 ng/mL  Important systemic inflammatory response, almost exclusively due to   severe   bacterial sepsis or septic shock.           Albumin     3.6         ALP     85         ALT     24         Anion Gap     11         Appearance, UA Clear             aPTT     31.4  Comment: Refer to local heparin nomogram for intensity/dose specific   therapeutic   range.           AST     49         Bacteria, UA None             Baso #     0.07         Basophil %     0.9         Bilirubin (UA) Negative             BILIRUBIN TOTAL     0.3  Comment: For infants and newborns, interpretation of results should be based  on gestational age, weight and in agreement with clinical  observations.    Premature Infant recommended reference ranges:  Up to 24 hours.............<8.0 mg/dL  Up to 48 hours............<12.0 mg/dL  3-5 days..................<15.0 mg/dL  6-29 days.................<15.0 mg/dL           BUN     13         Calcium     7.7         Chloride     101         CO2     20         Color, UA Yellow             Creatinine     0.8         Differential Method     Automated         eGFR     >60.0         Eos #     0.3         Eosinophil %     4.0         Flu A & B Source       Nasal swab       Glucose     94         Glucose, UA Negative             Gran # (ANC)     4.0         Gran %     53.5         Hematocrit     43.6         Hemoglobin     14.0         Hyaline Casts, UA 5             Immature Grans (Abs)     0.01  Comment: Mild elevation in immature granulocytes is non specific and   can be seen in a variety of conditions including stress response,   acute  inflammation, trauma and pregnancy. Correlation with other   laboratory and clinical findings is essential.           Immature Granulocytes     0.1         INR     1.0  Comment: Coumadin Therapy:  2.0 - 3.0 for INR for all indicators except mechanical heart valves  and antiphospholipid syndromes which should use 2.5 - 3.5.           Ketones, UA 2+             Lactate, Jamshid     0.7  Comment: Falsely low lactic acid results can be found in samples   containing >=13.0 mg/dL total bilirubin and/or >=3.5 mg/dL   direct bilirubin.           Leukocytes, UA Trace             Lymph #     2.1         Lymph %     27.9         Magnesium      1.8         MCH     29.8         MCHC     32.1         MCV     93         Microscopic Comment SEE COMMENT  Comment: Other formed elements not mentioned in the report are not   present in the microscopic examination.                Mono #     1.0         Mono %     13.6         MPV     10.4         NITRITE UA Negative             nRBC     0         Occult Blood UA Negative             pH, UA 6.0             Platelet Count     207         POC Glucose   87           Potassium     3.6         PROTEIN TOTAL     6.5         Protein, UA Trace  Comment: Recommend a 24 hour urine protein or a urine   protein/creatinine ratio if globulin induced proteinuria is  clinically suspected.               Protime     10.8         RBC     4.70         RBC, UA 1             RDW     14.6         SARS-CoV-2 RNA, Amplification, Qual       Positive  Comment: This test utilizes isothermal nucleic acid amplification technology   to   detect the SARS-CoV-2 RdRp nucleic acid segment. The analytical   sensitivity   (limit of detection) is 500 copies/swab.     A POSITIVE result is indicative of the presence of SARS-CoV-2 RNA;   clinical   correlation with patient history and other diagnostic information is   necessary to determine patient infection status.    A NEGATIVE result means that SARS-CoV-2 nucleic acids are not  present   above   the limit of detection. A NEGATIVE result should be treated as   presumptive.   It does not rule out the possibility of COVID-19 and should not be   the sole   basis for treatment decisions. If COVID-19 is strongly suspected   based on   clinical and exposure history, re-testing using an alternate   molecular assay   should be considered.     This test is only for use under the Food and Drug Administration s   Emergency   Use Authorization (EUA).     Commercial kits are provided by [employer]. Performance   characteristics of the EUA have been independently verified by   [facility]  _________________________________________________________________   The authorized Fact Sheet for Healthcare Providers and the authorized   Fact   Sheet for Patients of the ID NOW COVID-19 are available on the FDA   website:   https://www.fda.gov/media/180546/download   https://www.fda.gov/media/544661/download         Sodium     132         Specific [place], UA 1.010             Specimen UA Urine, Clean Catch             Squam Epithel, UA 2             Troponin I High Sensitivity     42.4  Comment: Troponin results differ between methods. Do not use   results between Troponin methods interchangeably.    Alkaline Phospatase levels above 400 U/L may   cause false positive results.    Access hsTnI should not be used for patients taking   Asfotase kelsy (Strensiq).           TSH     1.472         UROBILINOGEN UA Negative             WBC, UA 1             WBC     7.42                 Significant Imaging: I have reviewed all pertinent imaging results/findings within the past 24 hours.

## 2023-11-20 NOTE — PROGRESS NOTES
Pharmacist Renal Dose Adjustment Note    Amanda Pereira is a 77 y.o. female being treated with the medication famotidine    Patient Data:    Vital Signs (Most Recent):  Temp: 99.7 °F (37.6 °C) (11/20/23 0744)  Pulse: 78 (11/20/23 0834)  Resp: 20 (11/20/23 0834)  BP: 121/62 (11/20/23 0834)  SpO2: 98 % (11/20/23 0834) Vital Signs (72h Range):  Temp:  [99.7 °F (37.6 °C)]   Pulse:  [78-88]   Resp:  [20-22]   BP: (119-138)/(62-67)   SpO2:  [88 %-98 %]      Recent Labs   Lab 11/20/23 0805   CREATININE 0.8     Serum creatinine: 0.8 mg/dL 11/20/23 0805  Estimated creatinine clearance: 47.3 mL/min    Medication:famotidine dose: 20 mg frequency BID will be changed to medication:famotidine dose:20 mg frequency:Q24H  Reason: CrCl < 60 ml/min    Pharmacist's Name: Fab Wilson  Pharmacist's Extension: [phone]

## 2023-11-20 NOTE — PHARMACY MED REC
"              .        Admission Medication History     The home medication history was taken by Antonella Smith.    You may go to "Admission" then "Reconcile Home Medications" tabs to review and/or act upon these items.     The home medication list has been updated by the Pharmacy department.   Please read ALL comments highlighted in yellow.   Please address this information as you see fit.    Feel free to contact us if you have any questions or require assistance.        Medications listed below were obtained from: Patient/family and Analytic software- [employer]  No current facility-administered medications on file prior to encounter.     Current Outpatient Medications on File Prior to Encounter   Medication Sig Dispense Refill    clonazePAM (KLONOPIN) 2 MG Tab Take 1-2 mg by mouth every evening.      doxepin (SINEQUAN) 25 MG capsule Take 25 mg by mouth every evening.      EScitalopram oxalate (LEXAPRO) 20 MG tablet Take 20 mg by mouth every morning.      lamoTRIgine (LAMICTAL) 150 MG Tab Take 150 mg by mouth 2 (two) times daily.      loperamide HCl (IMODIUM A-D ORAL) Take 2 mg by mouth daily as needed.      traZODone (DESYREL) 150 MG tablet Take 150 mg by mouth every evening.      azelastine (ASTELIN) 137 mcg (0.1 %) nasal spray 1 spray (137 mcg total) by Nasal route 2 (two) times daily. (Patient not taking: Reported on 11/20/2023) 30 mL 0    labetaloL (NORMODYNE) 200 MG tablet Take 200 mg by mouth every evening.      mirabegron (MYRBETRIQ) 50 mg Tb24 Take 1 tablet (50 mg total) by mouth every morning. (Patient not taking: Reported on 11/20/2023) 90 tablet 3    pantoprazole (PROTONIX) 40 MG tablet Take 1 tablet (40 mg total) by mouth once daily. (Patient not taking: Reported on 11/20/2023) 30 tablet 0    tamsulosin (FLOMAX) 0.4 mg Cap Take 1 capsule (0.4 mg total) by mouth every evening. Take nightly for stent/stone (Patient not taking: Reported on 11/20/2023) 30 capsule 0    traMADoL (ULTRAM) 50 mg tablet Take 1 " tablet (50 mg total) by mouth every 6 (six) hours as needed for Pain. (Patient not taking: Reported on 11/20/2023) 10 tablet 0    [DISCONTINUED] aluminum-magnesium hydroxide-simethicone (MAALOX) 200-200-20 mg/5 mL Susp Take 30 mLs by mouth.      [DISCONTINUED] doxycycline (MONODOX) 100 MG capsule Take 1 capsule (100 mg total) by mouth 2 (two) times daily. 20 capsule 0    [DISCONTINUED] estradioL (ESTRACE) 0.01 % (0.1 mg/gram) vaginal cream Place 1 g vaginally once daily. Apply pea sized amount up to second knuckle. Apply nightly for 2 weeks then every other night. 42.5 g 3    [DISCONTINUED] fluorouraciL (EFUDEX) 5 % cream Apply 1 g topically every evening.         Potential issues to be addressed PRIOR TO DISCHARGE  Patient reported not taking the following medications: (Azelastine, Labetalol, Myrbetriq, Pantoprazole, Tamsulosin & Tramadol). These medications remain on the home medication list. Please address accordingly.     Antonella Smith  EXT [phone]

## 2023-11-20 NOTE — PROGRESS NOTES
Automatic Inhaler to Nebulizer Interchange    albuterol (Ventolin, ProAir, or Proventil)  mcg given multiple times per day changed to albuterol 2.5 mg q6h  per [facility] Automatic Therapeutic Substitutions Protocol.    Please contact pharmacy at extension [phone] with any questions.     Thank you,   Fab Wilson

## 2023-11-20 NOTE — PLAN OF CARE
[facility] - Emergency Dept  Initial Discharge Assessment       Primary Care Provider: Kareem Sosa MD    Admission Diagnosis: COVID-19 virus infection [U07.1]    Admission Date: 11/20/2023  Expected Discharge Date:     Pt is a 77-year-old female who arrived from home with COVID-19. Information verified as correct on facesheet. The assessment was completed at the patient's bedside.  Pt lives alone. Pt does not have a Living Will or Advance Directive. The Pt is oriented to person, places, and times. PCP last seen three weeks ago.  Pt denies Coumadin, dialysis, DME, HH, and has not been readmitted into the hospital in the last thirty days.  Pt is capable of performing ADLs without assistance. Pt drivers to and from medical appointments. Pt reports she takes medication as prescribed; pharmacy used [facility].  Pt verbalized plan to discharge home via family transport. Pt has no other needs to be addressed at this time. CM reviewed the chart and will continue to monitor.       Transition of Care Barriers: None    Payor: [employer] MEDICARE / Plan: [employer] MEDICARE HMO / Product Type: Capitation /     Extended Emergency Contact Information  Primary Emergency Contact: [facility]  Address: [address] [place] [address] [place] [address] [facility]  Home Phone: [phone]  Work Phone: [phone]  Mobile Phone: [phone]  Relation: Daughter    Discharge Plan A: Home  Discharge Plan B: Home with family      [id number] [place]  [address]  [address]  [place] [address]  Phone: [phone] Fax: [phone]    [id number]  [place] - [address]  [address]  [place] [address]  Phone: [phone] Fax: [phone]    [facility] - [place] - [address]  [address] [address]  [place] [address]  Phone: [phone] Fax: [phone]      Initial Assessment (most recent)       Adult Discharge Assessment - 11/20/23  1111          Discharge Assessment    Assessment Type Discharge Planning Assessment     Confirmed/corrected address, phone number and insurance Yes     Confirmed Demographics Correct on Facesheet     Source of Information patient     When was your last doctors appointment? --   three months ago    Communicated RITA with patient/caregiver Date not available/Unable to determine     Reason For Admission COVID-19     People in Home alone     Facility Arrived From: home     Do you expect to return to your current living situation? Yes     Do you have help at home or someone to help you manage your care at home? No     Prior to hospitilization cognitive status: Alert/Oriented     Current cognitive status: Alert/Oriented     Home Accessibility not wheelchair accessible     Equipment Currently Used at Home none     Readmission within 30 days? No     Patient currently being followed by outpatient case management? No     Do you currently have service(s) that help you manage your care at home? No     Do you take prescription medications? Yes     Do you have prescription coverage? Yes     Who is going to help you get home at discharge? Payor: [employer] MANAGED MEDICARE - [employer] MEDICARE HMO     How do you get to doctors appointments? car, drives self     Are you on dialysis? No     Do you take coumadin? No     DME Needed Upon Discharge  none     Discharge Plan discussed with: Patient     Transition of Care Barriers None     Discharge Plan A Home     Discharge Plan B Home with family

## 2023-11-20 NOTE — HPI
Patient is a 77-year-old female who presents with generalized body aches, cough, shortness breast, fatigue for the past 4 days.  Shortness of breath has been worsening encouraged at rest.  Patient also says she is been generally weak in his had poor p.o. intake.  Has had difficulty walking due to the weakness.  Has had subjective fevers and chills.  Patient denies any chest pain, nausea, vomiting, abdominal pain, or diarrhea.  On arrival, patient was 88% on room air.  The patient is now 2 L nasal cannula satting 96%.  CBC unremarkable.  Sodium 132, bicarb 20, creatinine 0.8.  Lactate 0.7.  COVID positive.  Chest x-ray with no acute findings.

## 2023-11-20 NOTE — PT/OT/SLP PROGRESS
Physical Therapy      Patient Name:  Amanda Pereira   MRN:  [id number]    Patient not seen today secondary to  (Nurse refused as pt had low tolerance for activity. On Vapotherm.). Will follow-up 11/21/2023.

## 2023-11-21 LAB
ALBUMIN SERPL BCP-MCNC: 3.2 G/DL (ref 3.5–5.2)
ALP SERPL-CCNC: 73 U/L (ref 55–135)
ALT SERPL W/O P-5'-P-CCNC: 19 U/L (ref 10–44)
ANION GAP SERPL CALC-SCNC: 6 MMOL/L (ref 8–16)
AST SERPL-CCNC: 32 U/L (ref 10–40)
BILIRUB SERPL-MCNC: 0.2 MG/DL (ref 0.1–1)
BUN SERPL-MCNC: 15 MG/DL (ref 8–23)
CALCIUM SERPL-MCNC: 7.8 MG/DL (ref 8.7–10.5)
CHLORIDE SERPL-SCNC: 109 MMOL/L (ref 95–110)
CO2 SERPL-SCNC: 25 MMOL/L (ref 23–29)
CREAT SERPL-MCNC: 0.7 MG/DL (ref 0.5–1.4)
CRP SERPL-MCNC: 10.1 MG/L (ref 0–8.2)
ERYTHROCYTE [DISTWIDTH] IN BLOOD BY AUTOMATED COUNT: 14.7 % (ref 11.5–14.5)
EST. GFR  (NO RACE VARIABLE): >60 ML/MIN/1.73 M^2
ESTIMATED AVG GLUCOSE: 128 MG/DL (ref 68–131)
GLUCOSE SERPL-MCNC: 117 MG/DL (ref 70–110)
GLUCOSE SERPL-MCNC: 140 MG/DL (ref 70–110)
GLUCOSE SERPL-MCNC: 141 MG/DL (ref 70–110)
GLUCOSE SERPL-MCNC: 145 MG/DL (ref 70–110)
GLUCOSE SERPL-MCNC: 167 MG/DL (ref 70–110)
HBA1C MFR BLD: 6.1 % (ref 4.5–6.2)
HCT VFR BLD AUTO: 41.5 % (ref 37–48.5)
HGB BLD-MCNC: 12.9 G/DL (ref 12–16)
MCH RBC QN AUTO: 28.9 PG (ref 27–31)
MCHC RBC AUTO-ENTMCNC: 31.1 G/DL (ref 32–36)
MCV RBC AUTO: 93 FL (ref 82–98)
PLATELET # BLD AUTO: 216 K/UL (ref 150–450)
PMV BLD AUTO: 10.8 FL (ref 9.2–12.9)
POTASSIUM SERPL-SCNC: 3.7 MMOL/L (ref 3.5–5.1)
PROT SERPL-MCNC: 5.7 G/DL (ref 6–8.4)
RBC # BLD AUTO: 4.46 M/UL (ref 4–5.4)
SODIUM SERPL-SCNC: 140 MMOL/L (ref 136–145)
WBC # BLD AUTO: 6.69 K/UL (ref 3.9–12.7)

## 2023-11-21 PROCEDURE — 63600175 PHARM REV CODE 636 W HCPCS: Performed by: STUDENT IN AN ORGANIZED HEALTH CARE EDUCATION/TRAINING PROGRAM

## 2023-11-21 PROCEDURE — 99900031 HC PATIENT EDUCATION (STAT)

## 2023-11-21 PROCEDURE — 80053 COMPREHEN METABOLIC PANEL: CPT | Performed by: STUDENT IN AN ORGANIZED HEALTH CARE EDUCATION/TRAINING PROGRAM

## 2023-11-21 PROCEDURE — 36415 COLL VENOUS BLD VENIPUNCTURE: CPT | Performed by: STUDENT IN AN ORGANIZED HEALTH CARE EDUCATION/TRAINING PROGRAM

## 2023-11-21 PROCEDURE — 27000221 HC OXYGEN, UP TO 24 HOURS

## 2023-11-21 PROCEDURE — 97535 SELF CARE MNGMENT TRAINING: CPT

## 2023-11-21 PROCEDURE — 97165 OT EVAL LOW COMPLEX 30 MIN: CPT

## 2023-11-21 PROCEDURE — 94640 AIRWAY INHALATION TREATMENT: CPT

## 2023-11-21 PROCEDURE — 25500020 PHARM REV CODE 255: Performed by: STUDENT IN AN ORGANIZED HEALTH CARE EDUCATION/TRAINING PROGRAM

## 2023-11-21 PROCEDURE — 83036 HEMOGLOBIN GLYCOSYLATED A1C: CPT | Performed by: STUDENT IN AN ORGANIZED HEALTH CARE EDUCATION/TRAINING PROGRAM

## 2023-11-21 PROCEDURE — 25000003 PHARM REV CODE 250: Performed by: STUDENT IN AN ORGANIZED HEALTH CARE EDUCATION/TRAINING PROGRAM

## 2023-11-21 PROCEDURE — A4216 STERILE WATER/SALINE, 10 ML: HCPCS | Performed by: STUDENT IN AN ORGANIZED HEALTH CARE EDUCATION/TRAINING PROGRAM

## 2023-11-21 PROCEDURE — 99900035 HC TECH TIME PER 15 MIN (STAT)

## 2023-11-21 PROCEDURE — 94761 N-INVAS EAR/PLS OXIMETRY MLT: CPT

## 2023-11-21 PROCEDURE — 97116 GAIT TRAINING THERAPY: CPT

## 2023-11-21 PROCEDURE — 85027 COMPLETE CBC AUTOMATED: CPT | Performed by: STUDENT IN AN ORGANIZED HEALTH CARE EDUCATION/TRAINING PROGRAM

## 2023-11-21 PROCEDURE — 12000002 HC ACUTE/MED SURGE SEMI-PRIVATE ROOM

## 2023-11-21 PROCEDURE — 97161 PT EVAL LOW COMPLEX 20 MIN: CPT

## 2023-11-21 PROCEDURE — 25000242 PHARM REV CODE 250 ALT 637 W/ HCPCS: Performed by: STUDENT IN AN ORGANIZED HEALTH CARE EDUCATION/TRAINING PROGRAM

## 2023-11-21 RX ADMIN — THERA TABS 1 TABLET: TAB at 09:11

## 2023-11-21 RX ADMIN — DEXAMETHASONE SODIUM PHOSPHATE 6 MG: 4 INJECTION, SOLUTION INTRA-ARTICULAR; INTRALESIONAL; INTRAMUSCULAR; INTRAVENOUS; SOFT TISSUE at 09:11

## 2023-11-21 RX ADMIN — ALBUTEROL SULFATE 2.5 MG: 2.5 SOLUTION RESPIRATORY (INHALATION) at 01:11

## 2023-11-21 RX ADMIN — FAMOTIDINE 20 MG: 10 INJECTION, SOLUTION INTRAVENOUS at 09:11

## 2023-11-21 RX ADMIN — IOHEXOL 100 ML: 350 INJECTION, SOLUTION INTRAVENOUS at 11:11

## 2023-11-21 RX ADMIN — MUPIROCIN 1 G: 20 OINTMENT TOPICAL at 09:11

## 2023-11-21 RX ADMIN — ENOXAPARIN SODIUM 40 MG: 40 INJECTION SUBCUTANEOUS at 05:11

## 2023-11-21 RX ADMIN — LAMOTRIGINE 150 MG: 100 TABLET ORAL at 10:11

## 2023-11-21 RX ADMIN — SODIUM CHLORIDE, PRESERVATIVE FREE 10 ML: 5 INJECTION INTRAVENOUS at 10:11

## 2023-11-21 RX ADMIN — ALBUTEROL SULFATE 2.5 MG: 2.5 SOLUTION RESPIRATORY (INHALATION) at 08:11

## 2023-11-21 RX ADMIN — LAMOTRIGINE 150 MG: 100 TABLET ORAL at 09:11

## 2023-11-21 RX ADMIN — MUPIROCIN 1 G: 20 OINTMENT TOPICAL at 10:11

## 2023-11-21 RX ADMIN — ALBUTEROL SULFATE 2.5 MG: 2.5 SOLUTION RESPIRATORY (INHALATION) at 07:11

## 2023-11-21 RX ADMIN — Medication 2000 UNITS: at 09:11

## 2023-11-21 RX ADMIN — DOXYCYCLINE 100 MG: 100 INJECTION, POWDER, LYOPHILIZED, FOR SOLUTION INTRAVENOUS at 10:11

## 2023-11-21 RX ADMIN — DOXEPIN HYDROCHLORIDE 25 MG: 25 CAPSULE ORAL at 10:11

## 2023-11-21 RX ADMIN — ESCITALOPRAM OXALATE 20 MG: 10 TABLET ORAL at 07:11

## 2023-11-21 NOTE — PT/OT/SLP EVAL
Occupational Therapy   Evaluation    Name: Amanda Pereira  MRN: [id number]  Admitting Diagnosis: COVID-19  Recent Surgery: * No surgery found *      Recommendations:     Discharge Recommendations: Low Intensity Therapy  Discharge Equipment Recommendations:  none  Barriers to discharge:  None    Assessment:     Amanda Pereira is a 77 y.o. female with a medical diagnosis of COVID-19.  Performance deficits affecting function: weakness, impaired endurance, impaired sensation, impaired self care skills, impaired functional mobility, gait instability, impaired cardiopulmonary response to activity.  Patient reports she is having a better day today. She reports increased weakness and difficulty eating over the past few days.     Rehab Prognosis: Good;  patient would benefit from acute skilled OT services to address these deficits and reach maximum level of function.       Plan:     Patient to be seen 5 x/week to address the above listed problems via self-care/home management, therapeutic activities, therapeutic exercises  Plan of Care Expires: 12/21/23  Plan of Care Reviewed with: patient    Subjective     Chief Complaint: diarrhea   Patient/Family Comments/goals: return home     Occupational Profile:  Living Environment: lives alone in single story home with four step entry  Previous level of function: independent with self care and utilizes no AD  Equipment Used at Home: none  Assistance upon Discharge: daughter and family are available for assistance at discharge    Pain/Comfort:  Pain Rating 1: 0/10  Pain Rating Post-Intervention 1: 0/10    Patients cultural, spiritual, Yarsani conflicts given the current situation: no    Objective:     Communicated with: nurse prior to session.  Patient found HOB elevated with oxygen, peripheral IV upon OT entry to room.    General Precautions: Standard, airborne, droplet, contact  Orthopedic Precautions: N/A  Braces: N/A  Respiratory Status: Nasal cannula, flow 3 L/min    Occupational  Performance:    Bed Mobility:    Patient completed Rolling/Turning to Left with  minimum assistance  Patient completed Rolling/Turning to Right with minimum assistance  Patient completed Scooting/Bridging with minimum assistance    Functional Mobility/Transfers:  Patient completed Sit <> Stand Transfer with minimum assistance  with  hand-held assist     Activities of Daily Living:  Lower Body Dressing: moderate assistance for change of brief   Grooming: Setup for oral care and washing face while seated at edge of bed    Cognitive/Visual Perceptual:  Cognitive/Psychosocial Skills:     -       Oriented to: Person, Place, Time, and Situation   -       Follows Commands/attention:Follows multistep  commands  -       Communication: clear/fluent  -       Memory: No Deficits noted  -       Safety awareness/insight to disability: intact   -       Mood/Affect/Coping skills/emotional control: Appropriate to situation    Physical Exam:  Upper Extremity Range of Motion:     -       Right Upper Extremity: WNL  -       Left Upper Extremity: WNL  Upper Extremity Strength:    -       Right Upper Extremity: WNL  -       Left Upper Extremity: WNL   Strength:    -       Right Upper Extremity: WNL  -       Left Upper Extremity: WNL  Fine Motor Coordination:    -       Intact    AMPAC 6 Click ADL:  AMPAC Total Score: 21    Treatment & Education:  Patient was educated on role of OT/POC, importance of getting out of bed during hospitalization, discharge planning and reviewed use of call bell for assistance.     Patient left HOB elevated with all lines intact and call button in reach    GOALS:   Multidisciplinary Problems       Occupational Therapy Goals          Problem: Occupational Therapy    Goal Priority Disciplines Outcome Interventions   Occupational Therapy Goal     OT, PT/OT     Description: Goals to be met by: 12/21/2023     Patient will increase functional independence with ADLs by performing:    UE Dressing with  Supervision.  LE Dressing with Supervision.  Grooming while standing at sink with Supervision.  Toileting from toilet with Supervision for hygiene and clothing management.   Bathing from  tub bench with Supervision.                         History:     Past Medical History:   Diagnosis Date    Depression     Diabetes mellitus     borderline    Kidney stone          Past Surgical History:   Procedure Laterality Date    CYSTOSCOPY N/A 9/21/2022    Procedure: CYSTOSCOPY;  Surgeon: Pepper Salas MD;  Location: [facility] OR;  Service: Urology;  Laterality: N/A;    EXTRACORPOREAL SHOCK WAVE LITHOTRIPSY Left 9/21/2022    Procedure: LITHOTRIPSY, ESWL;  Surgeon: Pepper Salas MD;  Location: [facility] OR;  Service: Urology;  Laterality: Left;    RETROGRADE PYELOGRAPHY Left 9/21/2022    Procedure: PYELOGRAM, RETROGRADE;  Surgeon: Pepper Salas MD;  Location: [facility] OR;  Service: Urology;  Laterality: Left;    TONSILLECTOMY, ADENOIDECTOMY      TUBAL LIGATION      WISDOM TOOTH EXTRACTION         Time Tracking:     OT Date of Treatment: 11/21/23  OT Start Time: 0957  OT Stop Time: 1018  OT Total Time (min): 21 min    Billable Minutes:Evaluation 10  Self Care/Home Management 11    11/21/2023

## 2023-11-21 NOTE — CARE UPDATE
11/21/23 0823   Patient Assessment/Suction   Level of Consciousness (AVPU) alert   Respiratory Effort Normal;Unlabored   Expansion/Accessory Muscles/Retractions no use of accessory muscles   All Lung Fields Breath Sounds diminished;rhonchi;coarse   Rhythm/Pattern, Respiratory unlabored   PRE-TX-O2   Device (Oxygen Therapy) nasal cannula   $ Is the patient on Low Flow Oxygen? Yes   Flow (L/min) 3   SpO2 96 %  (initially sats 93% - pt shallow breathing)   Pulse Oximetry Type Intermittent   $ Pulse Oximetry - Multiple Charge Pulse Oximetry - Multiple   Pulse 85   Resp 16   Aerosol Therapy   $ Aerosol Therapy Charges Aerosol Treatment   Daily Review of Necessity (SVN) completed   Respiratory Treatment Status (SVN) given   Treatment Route (SVN) mask;oxygen   Patient Position (SVN) semi-Rouse's   Post Treatment Assessment (SVN) breath sounds unchanged;vital signs unchanged   Signs of Intolerance (SVN) none   Education   $ Education Bronchodilator;DME Oxygen;15 min   Respiratory Evaluation   $ Care Plan Tech Time 15 min

## 2023-11-21 NOTE — PLAN OF CARE
Goals to be met by: 23     Patient will increase functional independence with mobility by performin. Sit to stand transfer with [place]  2. Bed to chair transfer with [place]   3. Gait  x 150 feet with Supervision

## 2023-11-21 NOTE — ASSESSMENT & PLAN NOTE
Patient with Hypoxic Respiratory failure which is Acute.  she is not on home oxygen. Supplemental oxygen was provided and noted- Oxygen Concentration (%):  [60] 60    .   Signs/symptoms of respiratory failure include- tachypnea, increased work of breathing, and respiratory distress. Contributing diagnoses includes -  COVID  Labs and images were reviewed. Patient Has not had a recent ABG. Will treat underlying causes and adjust management of respiratory failure as follows- Patient on 2 L nasal cannula.  We will wean oxygen as tolerated.  Continue IV dexamethasone, doxycycline, IV fluids, and albuterol.  Contact precautions.

## 2023-11-21 NOTE — SUBJECTIVE & OBJECTIVE
Interval History:  Patient reports feeling much better today.  CTA chest negative for PE.  Remains on 3 L nasal cannula.    Review of Systems   Constitutional:  Positive for activity change, appetite change, chills, fatigue and fever.   HENT: Negative.     Eyes: Negative.    Respiratory:  Positive for cough and shortness of breath. Negative for wheezing and stridor.    Cardiovascular: Negative.  Negative for chest pain, palpitations and leg swelling.   Gastrointestinal: Negative.    Endocrine: Negative.    Genitourinary: Negative.    Musculoskeletal: Negative.    Skin: Negative.    Neurological:  Positive for weakness and headaches. Negative for dizziness, seizures, facial asymmetry, speech difficulty, light-headedness and numbness.   Hematological: Negative.    Psychiatric/Behavioral: Negative.       Objective:     Vital Signs (Most Recent):  Temp: 98 °F (36.7 °C) (11/21/23 1119)  Pulse: 71 (11/21/23 1119)  Resp: 17 (11/21/23 1119)  BP: (!) 94/58 (nurse notified eliot) (11/21/23 1119)  SpO2: (!) 91 % (11/21/23 1119) Vital Signs (24h Range):  Temp:  [97.8 °F (36.6 °C)-98.6 °F (37 °C)] 98 °F (36.7 °C)  Pulse:  [64-88] 71  Resp:  [14-20] 17  SpO2:  [91 %-98 %] 91 %  BP: ()/(51-63) 94/58     Weight: 59 kg (130 lb 1.1 oz)  Body mass index is 23.79 kg/m².    Intake/Output Summary (Last 24 hours) at 11/21/2023 1309  Last data filed at 11/21/2023 0633  Gross per 24 hour   Intake 2101.67 ml   Output --   Net 2101.67 ml         Physical Exam  Vitals and nursing note reviewed.   Constitutional:       General: She is not in acute distress.     Comments: Weak appearing, on nasal cannula   HENT:      Head: Normocephalic and atraumatic.      Nose: Nose normal.   Eyes:      Extraocular Movements: Extraocular movements intact.      Conjunctiva/sclera: Conjunctivae normal.   Cardiovascular:      Rate and Rhythm: Normal rate and regular rhythm.   Pulmonary:      Effort: Pulmonary effort is normal. No respiratory distress.       Breath sounds: Normal breath sounds. No stridor. No wheezing or rales.   Abdominal:      General: Bowel sounds are normal. There is no distension.      Palpations: Abdomen is soft.      Tenderness: There is no abdominal tenderness.   Musculoskeletal:         General: No swelling or tenderness. Normal range of motion.      Cervical back: Normal range of motion and neck supple.      Right lower leg: No edema.      Left lower leg: No edema.   Skin:     General: Skin is warm and dry.   Neurological:      General: No focal deficit present.      Mental Status: She is alert and oriented to person, place, and time.             Significant Labs: All pertinent labs within the past 24 hours have been reviewed.  Recent Lab Results  (Last 5 results in the past 24 hours)        11/21/23  1056   11/21/23  0656   11/21/23  0527   11/20/23  2327   11/20/23  1956        Albumin     3.2           ALP     73           ALT     19           Anion Gap     6           AST     32           BILIRUBIN TOTAL     0.2  Comment: For infants and newborns, interpretation of results should be based  on gestational age, weight and in agreement with clinical  observations.    Premature Infant recommended reference ranges:  Up to 24 hours.............<8.0 mg/dL  Up to 48 hours............<12.0 mg/dL  3-5 days..................<15.0 mg/dL  6-29 days.................<15.0 mg/dL             BUN     15           Calcium     7.8           Chloride     109           CO2     25           Creatinine     0.7           CRP               D-Dimer               eGFR     >60.0           Estimated Avg Glucose     128           Glucose     145           Hematocrit     41.5           Hemoglobin     12.9           Hemoglobin A1C External     6.1  Comment: According to ADA guidelines, hemoglobin A1C <7.0% represents  optimal control in non-pregnant diabetic patients.  Different  metrics may apply to specific populations.   Standards of Medical Care in Diabetes -  2016.    For the purpose of screening for the presence of diabetes:  <5.7%     Consistent with the absence of diabetes  5.7-6.4%  Consistent with increasing risk for diabetes   (prediabetes)  >or=6.5%  Consistent with diabetes    Currently no consensus exists for use of hemoglobin A1C  for diagnosis of diabetes for children.             MCH     28.9           MCHC     31.1           MCV     93           MPV     10.8           Platelet Count     216           POC Glucose 141   117     222   291       Potassium     3.7           PROTEIN TOTAL     5.7           RBC     4.46           RDW     14.7           Sodium     140           Troponin I High Sensitivity               WBC     6.69                                  Significant Imaging: I have reviewed all pertinent imaging results/findings within the past 24 hours.

## 2023-11-21 NOTE — PROGRESS NOTES
[facility]  Progress Note    Patient Name: Amanda Pereira  MRN: [id number]  Patient Class: IP- Inpatient   Admission Date: 11/20/2023  Length of Stay: 1 days  Attending Physician: Boby Daniels MD  Primary Care Provider: Kareem Sosa MD        Subjective:     Principal Problem:COVID-19        HPI:  Patient is a 77-year-old female who presents with generalized body aches, cough, shortness breast, fatigue for the past 4 days.  Shortness of breath has been worsening encouraged at rest.  Patient also says she is been generally weak in his had poor p.o. intake.  Has had difficulty walking due to the weakness.  Has had subjective fevers and chills.  Patient denies any chest pain, nausea, vomiting, abdominal pain, or diarrhea.  On arrival, patient was 88% on room air.  The patient is now 2 L nasal cannula satting 96%.  CBC unremarkable.  Sodium 132, bicarb 20, creatinine 0.8.  Lactate 0.7.  COVID positive.  Chest x-ray with no acute findings.    Overview/Hospital Course:  No notes on file    Interval History:  Patient reports feeling much better today.  CTA chest negative for PE.  Remains on 3 L nasal cannula.    Review of Systems   Constitutional:  Positive for activity change, appetite change, chills, fatigue and fever.   HENT: Negative.     Eyes: Negative.    Respiratory:  Positive for cough and shortness of breath. Negative for wheezing and stridor.    Cardiovascular: Negative.  Negative for chest pain, palpitations and leg swelling.   Gastrointestinal: Negative.    Endocrine: Negative.    Genitourinary: Negative.    Musculoskeletal: Negative.    Skin: Negative.    Neurological:  Positive for weakness and headaches. Negative for dizziness, seizures, facial asymmetry, speech difficulty, light-headedness and numbness.   Hematological: Negative.    Psychiatric/Behavioral: Negative.       Objective:     Vital Signs (Most Recent):  Temp: 98 °F (36.7 °C) (11/21/23 1119)  Pulse: 71 (11/21/23  1119)  Resp: 17 (11/21/23 1119)  BP: (!) 94/58 (nurse notified eliot) (11/21/23 1119)  SpO2: (!) 91 % (11/21/23 1119) Vital Signs (24h Range):  Temp:  [97.8 °F (36.6 °C)-98.6 °F (37 °C)] 98 °F (36.7 °C)  Pulse:  [64-88] 71  Resp:  [14-20] 17  SpO2:  [91 %-98 %] 91 %  BP: ()/(51-63) 94/58     Weight: 59 kg (130 lb 1.1 oz)  Body mass index is 23.79 kg/m².    Intake/Output Summary (Last 24 hours) at 11/21/2023 1309  Last data filed at 11/21/2023 0633  Gross per 24 hour   Intake 2101.67 ml   Output --   Net 2101.67 ml         Physical Exam  Vitals and nursing note reviewed.   Constitutional:       General: She is not in acute distress.     Comments: Weak appearing, on nasal cannula   HENT:      Head: Normocephalic and atraumatic.      Nose: Nose normal.   Eyes:      Extraocular Movements: Extraocular movements intact.      Conjunctiva/sclera: Conjunctivae normal.   Cardiovascular:      Rate and Rhythm: Normal rate and regular rhythm.   Pulmonary:      Effort: Pulmonary effort is normal. No respiratory distress.      Breath sounds: Normal breath sounds. No stridor. No wheezing or rales.   Abdominal:      General: Bowel sounds are normal. There is no distension.      Palpations: Abdomen is soft.      Tenderness: There is no abdominal tenderness.   Musculoskeletal:         General: No swelling or tenderness. Normal range of motion.      Cervical back: Normal range of motion and neck supple.      Right lower leg: No edema.      Left lower leg: No edema.   Skin:     General: Skin is warm and dry.   Neurological:      General: No focal deficit present.      Mental Status: She is alert and oriented to person, place, and time.             Significant Labs: All pertinent labs within the past 24 hours have been reviewed.  Recent Lab Results  (Last 5 results in the past 24 hours)        11/21/23  1056   11/21/23  0656   11/21/23  0527   11/20/23  2327   11/20/23  1956        Albumin     3.2           ALP     73           ALT      19           Anion Gap     6           AST     32           BILIRUBIN TOTAL     0.2  Comment: For infants and newborns, interpretation of results should be based  on gestational age, weight and in agreement with clinical  observations.    Premature Infant recommended reference ranges:  Up to 24 hours.............<8.0 mg/dL  Up to 48 hours............<12.0 mg/dL  3-5 days..................<15.0 mg/dL  6-29 days.................<15.0 mg/dL             BUN     15           Calcium     7.8           Chloride     109           CO2     25           Creatinine     0.7           CRP               D-Dimer               eGFR     >60.0           Estimated Avg Glucose     128           Glucose     145           Hematocrit     41.5           Hemoglobin     12.9           Hemoglobin A1C External     6.1  Comment: According to ADA guidelines, hemoglobin A1C <7.0% represents  optimal control in non-pregnant diabetic patients.  Different  metrics may apply to specific populations.   Standards of Medical Care in Diabetes - 2016.    For the purpose of screening for the presence of diabetes:  <5.7%     Consistent with the absence of diabetes  5.7-6.4%  Consistent with increasing risk for diabetes   (prediabetes)  >or=6.5%  Consistent with diabetes    Currently no consensus exists for use of hemoglobin A1C  for diagnosis of diabetes for children.             MCH     28.9           MCHC     31.1           MCV     93           MPV     10.8           Platelet Count     216           POC Glucose 141   117     222   291       Potassium     3.7           PROTEIN TOTAL     5.7           RBC     4.46           RDW     14.7           Sodium     140           Troponin I High Sensitivity               WBC     6.69                                  Significant Imaging: I have reviewed all pertinent imaging results/findings within the past 24 hours.    Assessment/Plan:      * COVID-19  Patient on 2 L nasal cannula.  We will wean oxygen as  tolerated.  Continue IV dexamethasone, doxycycline, IV fluids, and albuterol.  Contact precautions.    Acute hypoxemic respiratory failure  Patient with Hypoxic Respiratory failure which is Acute.  she is not on home oxygen. Supplemental oxygen was provided and noted- Oxygen Concentration (%):  [60] 60    .   Signs/symptoms of respiratory failure include- tachypnea, increased work of breathing, and respiratory distress. Contributing diagnoses includes -  COVID  Labs and images were reviewed. Patient Has not had a recent ABG. Will treat underlying causes and adjust management of respiratory failure as follows- Patient on 2 L nasal cannula.  We will wean oxygen as tolerated.  Continue IV dexamethasone, doxycycline, IV fluids, and albuterol.  Contact precautions.    GERD (gastroesophageal reflux disease)  Continue Pepcid      Depression  Continue home medications          VTE Risk Mitigation (From admission, onward)           Ordered     enoxaparin injection 40 mg  Daily         11/20/23 1011     IP VTE HIGH RISK PATIENT  Once         11/20/23 1011     Place sequential compression device  Until discontinued         11/20/23 1011                    Discharge Planning   RITA: 11/22/2023     Code Status: Full Code   Is the patient medically ready for discharge?:     Reason for patient still in hospital (select all that apply): Treatment  Discharge Plan A: Home                  Boby Daniels MD  Department of Hospital Medicine   [facility]

## 2023-11-21 NOTE — NURSING
Nurses Note -- 4 Eyes      11/20/2023   9:26 PM      Skin assessed during: Admit      [] No Altered Skin Integrity Present    []Prevention Measures Documented   Blanchable Redness right lower leg ( shin) skin intact  Blanchable redness sacrum    [] Yes- Altered Skin Integrity Present or Discovered   [] LDA Added if Not in Epic (Describe Wound)   [] New Altered Skin Integrity was Present on Admit and Documented in LDA   [x] Wound Image Taken    Wound Care Consulted? No    Attending Nurse:  Adrienne Kelly RN/Staff Member:   gloria

## 2023-11-21 NOTE — RESPIRATORY THERAPY
11/20/23 1933   Patient Assessment/Suction   Level of Consciousness (AVPU) alert   Respiratory Effort Unlabored;Normal   Expansion/Accessory Muscles/Retractions no use of accessory muscles   All Lung Fields Breath Sounds Anterior:;coarse   Rhythm/Pattern, Respiratory unlabored;pattern regular;depth regular;no shortness of breath reported   Cough Frequency infrequent   PRE-TX-O2   Device (Oxygen Therapy) nasal cannula   Flow (L/min) 4   SpO2 (!) 93 %   Pulse Oximetry Type Intermittent   $ Pulse Oximetry - Multiple Charge Pulse Oximetry - Multiple   Pulse 85   Resp 20   Aerosol Therapy   $ Aerosol Therapy Charges Aerosol Treatment   Daily Review of Necessity (SVN) completed   Respiratory Treatment Status (SVN) given   Treatment Route (SVN) mask;oxygen   Patient Position (SVN) HOB elevated   Post Treatment Assessment (SVN) breath sounds unchanged   Signs of Intolerance (SVN) none   Breath Sounds Post-Respiratory Treatment   Throughout All Fields Post-Treatment no change   Post-treatment Heart Rate (beats/min) 85   Post-treatment Resp Rate (breaths/min) 20   Education   $ Education Bronchodilator;DME Nebulizer;DME Oxygen;15 min   Respiratory Evaluation   $ Care Plan Tech Time 15 min   $ Eval/Re-eval Charges Evaluation

## 2023-11-21 NOTE — PT/OT/SLP EVAL
"Physical Therapy Evaluation    Patient Name:  Amanda Pereira   MRN:  [id number]    Recommendations:     Discharge Recommendations: Low Intensity Therapy   Discharge Equipment Recommendations: none   Barriers to discharge: Decreased caregiver support and poor safety awareness    Assessment:     Amanda Pereira is a 77 y.o. female admitted with a medical diagnosis of COVID-19.  She presents with the following impairments/functional limitations: gait instability, decreased safety awareness, weakness, impaired endurance, impaired functional mobility, impaired balance, impaired cardiopulmonary response to activity.    Pt found HOB elevated on 3Lpm O2 after respiratory tx and agreeable to working with PT. Pt A & O x  4 and has the following co-morbidities: Depression, GERD.  Pt tolerated session fairly and required only SBA > Sup assist for safe mobilization during session today with pt moving quickly/needing vc regarding keeping O2 tubing untangled. Pt would benefit from acute PT during hospitalization to increase strength, endurance and safety with mobility and would benefit from follow up Low Intensity therapy upon discharge home.      Rehab Prognosis: Fair; patient would benefit from acute skilled PT services to address these deficits and reach maximum level of function.    Recent Surgery: * No surgery found *      Plan:     During this hospitalization, patient to be seen 5 x/week to address the identified rehab impairments via gait training, therapeutic activities, therapeutic exercises and progress toward the following goals:    Plan of Care Expires:  12/19/23    Subjective     Chief Complaint: "I'm ready to go home to my baby(dog)"  Patient/Family Comments/goals: pt wants to go home today. Pt asked PT "do I have to have home health?"(PT recommended she discuss with her doctor & CM)  Pain/Comfort:  Pain Rating 1: 0/10  Pain Rating Post-Intervention 1: 0/10    Patients cultural, spiritual, Taoism conflicts given the " "current situation:      Living Environment:  Pt lives alone in a 2SH with 4 steps to enter.  Prior to admission, patients level of function was independent with mobility and driving.  Equipment used at home: none.  DME owned (not currently used): none.  Upon discharge, patient will have assistance from her sister & daughter(once daughter feeling better as she is currently "sick").    Objective:     Communicated with DOUG Gomez prior to session.  Patient found HOB elevated with oxygen, peripheral IV  upon PT entry to room.    General Precautions: Standard, airborne, droplet, contact, fall  Orthopedic Precautions:N/A   Braces: N/A  Respiratory Status: Nasal cannula, flow 3 L/min    Exams:  Cognitive Exam:  Patient is oriented to Person, Place, Time, and Situation  RLE ROM: WFL  RLE Strength: WFL  LLE ROM: WFL  LLE Strength: WFL    Functional Mobility:  Bed Mobility:     Scooting: modified independence  Supine to Sit: modified independence  Sit to Supine: modified independence  Transfers:     Sit to Stand:  stand by assistance and with pt initially unstable due to moving too quickly with no AD  Gait: x 100' with no AD with SBA > Sup A for safety due to pt moving quickly and getting herself tangled in O2 tubing by not paying attention when she made turns. SPO2 92% after gait trial on 3Lpm.      AM-PAC 6 CLICK MOBILITY  Total Score:22       Treatment & Education:  Pt was educated on the following: call light use, importance of OOB activity and functional mobility to negate the negative effects of prolonged bed rest during this hospitalization, safe transfers/ambulation while paying attention to O2 tubing, weaning off of O2 at home with assistance of RN & PT and discharge planning recommendations/options.      Patient left HOB elevated with all lines intact, call button in reach, and RN notified.    GOALS:   Multidisciplinary Problems       Physical Therapy Goals          Problem: Physical Therapy    Goal Priority " Disciplines Outcome Goal Variances Interventions   Physical Therapy Goal     PT, PT/OT      Description: Goals to be met by: 23     Patient will increase functional independence with mobility by performin. Sit to stand transfer with [place]  2. Bed to chair transfer with [place]   3. Gait  x 150 feet with Supervision                               History:     Past Medical History:   Diagnosis Date    Depression     Diabetes mellitus     borderline    Kidney stone        Past Surgical History:   Procedure Laterality Date    CYSTOSCOPY N/A 2022    Procedure: CYSTOSCOPY;  Surgeon: Pepper Salas MD;  Location: [facility] OR;  Service: Urology;  Laterality: N/A;    EXTRACORPOREAL SHOCK WAVE LITHOTRIPSY Left 2022    Procedure: LITHOTRIPSY, ESWL;  Surgeon: Pepper Salas MD;  Location: [facility] OR;  Service: Urology;  Laterality: Left;    RETROGRADE PYELOGRAPHY Left 2022    Procedure: PYELOGRAM, RETROGRADE;  Surgeon: Pepper Salas MD;  Location: [facility] OR;  Service: Urology;  Laterality: Left;    TONSILLECTOMY, ADENOIDECTOMY      TUBAL LIGATION      WISDOM TOOTH EXTRACTION         Time Tracking:     PT Received On: 23  PT Start Time: 1339     PT Stop Time: 1355  PT Total Time (min): 16 min     Billable Minutes: Evaluation 8 and Gait Training 8      2023

## 2023-11-22 VITALS
HEART RATE: 67 BPM | TEMPERATURE: 98 F | HEIGHT: 62 IN | DIASTOLIC BLOOD PRESSURE: 68 MMHG | RESPIRATION RATE: 17 BRPM | WEIGHT: 130.06 LBS | SYSTOLIC BLOOD PRESSURE: 109 MMHG | OXYGEN SATURATION: 93 % | BODY MASS INDEX: 23.93 KG/M2

## 2023-11-22 DIAGNOSIS — U07.1 COVID-19 VIRUS DETECTED: ICD-10-CM

## 2023-11-22 LAB
ALBUMIN SERPL BCP-MCNC: 3 G/DL (ref 3.5–5.2)
ALP SERPL-CCNC: 68 U/L (ref 55–135)
ALT SERPL W/O P-5'-P-CCNC: 14 U/L (ref 10–44)
ANION GAP SERPL CALC-SCNC: 3 MMOL/L (ref 8–16)
AST SERPL-CCNC: 26 U/L (ref 10–40)
BILIRUB SERPL-MCNC: 0.2 MG/DL (ref 0.1–1)
BUN SERPL-MCNC: 15 MG/DL (ref 8–23)
CALCIUM SERPL-MCNC: 8 MG/DL (ref 8.7–10.5)
CHLORIDE SERPL-SCNC: 109 MMOL/L (ref 95–110)
CO2 SERPL-SCNC: 27 MMOL/L (ref 23–29)
CREAT SERPL-MCNC: 0.8 MG/DL (ref 0.5–1.4)
ERYTHROCYTE [DISTWIDTH] IN BLOOD BY AUTOMATED COUNT: 14.8 % (ref 11.5–14.5)
EST. GFR  (NO RACE VARIABLE): >60 ML/MIN/1.73 M^2
GLUCOSE SERPL-MCNC: 136 MG/DL (ref 70–110)
GLUCOSE SERPL-MCNC: 88 MG/DL (ref 70–110)
GLUCOSE SERPL-MCNC: 93 MG/DL (ref 70–110)
HCT VFR BLD AUTO: 38.8 % (ref 37–48.5)
HGB BLD-MCNC: 12.4 G/DL (ref 12–16)
MCH RBC QN AUTO: 29.7 PG (ref 27–31)
MCHC RBC AUTO-ENTMCNC: 32 G/DL (ref 32–36)
MCV RBC AUTO: 93 FL (ref 82–98)
PLATELET # BLD AUTO: 203 K/UL (ref 150–450)
PMV BLD AUTO: 10.9 FL (ref 9.2–12.9)
POTASSIUM SERPL-SCNC: 3.9 MMOL/L (ref 3.5–5.1)
PROT SERPL-MCNC: 5.4 G/DL (ref 6–8.4)
RBC # BLD AUTO: 4.17 M/UL (ref 4–5.4)
SODIUM SERPL-SCNC: 139 MMOL/L (ref 136–145)
WBC # BLD AUTO: 7.84 K/UL (ref 3.9–12.7)

## 2023-11-22 PROCEDURE — 99900035 HC TECH TIME PER 15 MIN (STAT)

## 2023-11-22 PROCEDURE — 36415 COLL VENOUS BLD VENIPUNCTURE: CPT | Performed by: STUDENT IN AN ORGANIZED HEALTH CARE EDUCATION/TRAINING PROGRAM

## 2023-11-22 PROCEDURE — 25000242 PHARM REV CODE 250 ALT 637 W/ HCPCS: Performed by: STUDENT IN AN ORGANIZED HEALTH CARE EDUCATION/TRAINING PROGRAM

## 2023-11-22 PROCEDURE — 27000221 HC OXYGEN, UP TO 24 HOURS

## 2023-11-22 PROCEDURE — 99900031 HC PATIENT EDUCATION (STAT)

## 2023-11-22 PROCEDURE — A4216 STERILE WATER/SALINE, 10 ML: HCPCS | Performed by: STUDENT IN AN ORGANIZED HEALTH CARE EDUCATION/TRAINING PROGRAM

## 2023-11-22 PROCEDURE — 94640 AIRWAY INHALATION TREATMENT: CPT

## 2023-11-22 PROCEDURE — 63600175 PHARM REV CODE 636 W HCPCS: Performed by: STUDENT IN AN ORGANIZED HEALTH CARE EDUCATION/TRAINING PROGRAM

## 2023-11-22 PROCEDURE — 80053 COMPREHEN METABOLIC PANEL: CPT | Performed by: STUDENT IN AN ORGANIZED HEALTH CARE EDUCATION/TRAINING PROGRAM

## 2023-11-22 PROCEDURE — 85027 COMPLETE CBC AUTOMATED: CPT | Performed by: STUDENT IN AN ORGANIZED HEALTH CARE EDUCATION/TRAINING PROGRAM

## 2023-11-22 PROCEDURE — 25000003 PHARM REV CODE 250: Performed by: STUDENT IN AN ORGANIZED HEALTH CARE EDUCATION/TRAINING PROGRAM

## 2023-11-22 RX ORDER — DOXYCYCLINE HYCLATE 100 MG
100 TABLET ORAL EVERY 12 HOURS
Qty: 6 TABLET | Refills: 0 | Status: ON HOLD | OUTPATIENT
Start: 2023-11-22 | End: 2023-12-01 | Stop reason: HOSPADM

## 2023-11-22 RX ORDER — PREDNISONE 20 MG/1
40 TABLET ORAL DAILY
Qty: 6 TABLET | Refills: 0 | Status: ON HOLD | OUTPATIENT
Start: 2023-11-22 | End: 2023-12-01 | Stop reason: HOSPADM

## 2023-11-22 RX ORDER — ONDANSETRON 4 MG/1
4 TABLET, ORALLY DISINTEGRATING ORAL EVERY 6 HOURS PRN
Qty: 20 TABLET | Refills: 0 | Status: ON HOLD | OUTPATIENT
Start: 2023-11-22 | End: 2023-12-01 | Stop reason: HOSPADM

## 2023-11-22 RX ORDER — BENZONATATE 100 MG/1
100 CAPSULE ORAL 3 TIMES DAILY PRN
Qty: 30 CAPSULE | Refills: 0 | Status: SHIPPED | OUTPATIENT
Start: 2023-11-22 | End: 2023-12-02

## 2023-11-22 RX ADMIN — Medication 2000 UNITS: at 08:11

## 2023-11-22 RX ADMIN — THERA TABS 1 TABLET: TAB at 08:11

## 2023-11-22 RX ADMIN — FAMOTIDINE 20 MG: 10 INJECTION, SOLUTION INTRAVENOUS at 08:11

## 2023-11-22 RX ADMIN — SODIUM CHLORIDE, PRESERVATIVE FREE 10 ML: 5 INJECTION INTRAVENOUS at 09:11

## 2023-11-22 RX ADMIN — DEXAMETHASONE SODIUM PHOSPHATE 6 MG: 4 INJECTION, SOLUTION INTRA-ARTICULAR; INTRALESIONAL; INTRAMUSCULAR; INTRAVENOUS; SOFT TISSUE at 08:11

## 2023-11-22 RX ADMIN — DOXYCYCLINE 100 MG: 100 INJECTION, POWDER, LYOPHILIZED, FOR SOLUTION INTRAVENOUS at 10:11

## 2023-11-22 RX ADMIN — ESCITALOPRAM OXALATE 20 MG: 10 TABLET ORAL at 06:11

## 2023-11-22 RX ADMIN — ALBUTEROL SULFATE 2.5 MG: 2.5 SOLUTION RESPIRATORY (INHALATION) at 12:11

## 2023-11-22 RX ADMIN — LAMOTRIGINE 150 MG: 100 TABLET ORAL at 08:11

## 2023-11-22 RX ADMIN — MUPIROCIN 1 G: 20 OINTMENT TOPICAL at 08:11

## 2023-11-22 NOTE — CARE UPDATE
11/21/23 1940   Patient Assessment/Suction   Level of Consciousness (AVPU) alert   Respiratory Effort Normal;Unlabored   All Lung Fields Breath Sounds clear   Cough Type no productive sputum   PRE-TX-O2   Device (Oxygen Therapy) nasal cannula   $ Is the patient on Low Flow Oxygen? Yes   Flow (L/min) 3   SpO2 (!) 93 %   Pulse Oximetry Type Intermittent   $ Pulse Oximetry - Multiple Charge Pulse Oximetry - Multiple   Pulse 68   Resp 16   Aerosol Therapy   $ Aerosol Therapy Charges Aerosol Treatment   Daily Review of Necessity (SVN) completed   Respiratory Treatment Status (SVN) given   Treatment Route (SVN) mask   Patient Position (SVN) semi-Rouse's   Post Treatment Assessment (SVN) breath sounds unchanged   Signs of Intolerance (SVN) none   Breath Sounds Post-Respiratory Treatment   Post-treatment Heart Rate (beats/min) 69   Post-treatment Resp Rate (breaths/min) 16   Respiratory Evaluation   $ Care Plan Tech Time 15 min

## 2023-11-22 NOTE — PLAN OF CARE
Oxygen approved by Timothy Major with [facility]. E-cylinder with cart and nasal cannula pulled from [facility] closet at his approval, paperwork completed and signed by patient, and E-cylinder with cart delivered to patient's bedside. Use of tank and oxygen safety measures reviewed with patient and she expressed understanding. Informed by Timothy Major that home oxygen equipment is being set up at patient's home at this time.

## 2023-11-22 NOTE — CARE UPDATE
11/22/23 0700   Patient Assessment/Suction   Level of Consciousness (AVPU) alert   Respiratory Effort Normal;Unlabored   Aerosol Therapy   $ Aerosol Therapy Charges Refused   Respiratory Treatment Status (SVN) refused

## 2023-11-22 NOTE — NURSING
0700: report received, in bed with no distress  1230: waiting on oxygen to be arranged for discharge  home  1500: Went over all discharge instructions, answered all questions to patient/family satisfaction, removed saline lock, placed, escorted patient out to private vehicle.

## 2023-11-22 NOTE — CARE UPDATE
11/22/23 1241   Patient Assessment/Suction   Level of Consciousness (AVPU) alert   Respiratory Effort Normal;Unlabored   Expansion/Accessory Muscles/Retractions no retractions;expansion symmetric;no use of accessory muscles   Rhythm/Pattern, Respiratory unlabored   Cough Frequency no cough   PRE-TX-O2   Device (Oxygen Therapy) nasal cannula   Flow (L/min) 3   SpO2 (!) 93 %   Pulse 67   Resp 17   Aerosol Therapy   $ Aerosol Therapy Charges Aerosol Treatment   Daily Review of Necessity (SVN) completed   Respiratory Treatment Status (SVN) given   Treatment Route (SVN) mask   Patient Position (SVN) semi-Rouse's   Post Treatment Assessment (SVN) increased aeration   Signs of Intolerance (SVN) none   Breath Sounds Post-Respiratory Treatment   Throughout All Fields Post-Treatment All Fields   Throughout All Fields Post-Treatment aeration increased   Post-treatment Heart Rate (beats/min) 70   Post-treatment Resp Rate (breaths/min) 16   Education   $ Education Bronchodilator;15 min

## 2023-11-22 NOTE — PLAN OF CARE
Home Oxygen Qualification study from respiratory noted.  Secure chat sent to Timothy Major at [facility] to assess for home O2 equipment. Awaiting response. Will continue to follow and update as appropriate.

## 2023-11-22 NOTE — DISCHARGE SUMMARY
[facility] Medicine  Discharge Summary      Patient Name: Amanda Pereira  MRN: [id number]  ODESSA: [id number]  Patient Class: IP- Inpatient  Admission Date: 11/20/2023  Hospital Length of Stay: 2 days  Discharge Date and Time:  11/22/2023 12:55 PM  Attending Physician: Boby Daniels MD   Discharging Provider: Boby Daniels MD  Primary Care Provider: Kareem Sosa MD    Primary Care Team: Networked reference to record PCT     HPI:   Patient is a 77-year-old female who presents with generalized body aches, cough, shortness breast, fatigue for the past 4 days.  Shortness of breath has been worsening encouraged at rest.  Patient also says she is been generally weak in his had poor p.o. intake.  Has had difficulty walking due to the weakness.  Has had subjective fevers and chills.  Patient denies any chest pain, nausea, vomiting, abdominal pain, or diarrhea.  On arrival, patient was 88% on room air.  The patient is now 2 L nasal cannula satting 96%.  CBC unremarkable.  Sodium 132, bicarb 20, creatinine 0.8.  Lactate 0.7.  COVID positive.  Chest x-ray with no acute findings.    * No surgery found *      Hospital Course:   Patient admitted with COVID pneumonitis.  Patient was treated with IV dexamethasone, doxycycline, and breathing treatments.  Cultures no growth.  Patient did require 2 L oxygen during stay.  Patient did qualify for home oxygen.  We will discharge home with oxygen.  We will continue with p.o. doxycycline and prednisone on discharge.  Patient discharged home on November 22nd.     Goals of Care Treatment Preferences:  Code Status: Full Code      Consults:     No new Assessment & Plan notes have been filed under this hospital service since the last note was generated.  Service: Hospital Medicine    Final Active Diagnoses:    Diagnosis Date Noted POA    PRINCIPAL PROBLEM:  COVID-19 [U07.1] 11/20/2023 Yes    Acute hypoxemic respiratory failure [J96.01] 11/20/2023 Yes    GERD  "(gastroesophageal reflux disease) [K21.9] 06/18/2019 Yes     Chronic    Depression [F32.A]  Yes      Problems Resolved During this Admission:       Discharged Condition: good    Disposition: Home or Self Care    Follow Up:    Patient Instructions:      OXYGEN FOR HOME USE     Order Specific Question Answer Comments   Liter Flow 2    Duration Continuous    Qualifying Test Performed at: Activity    Oxygen saturation at rest 90    Oxygen saturation with activity 86    Oxygen saturation with activity on oxygen 93    Portable mode: continuous    Route nasal cannula    Device: home concentrator with portable concentrator    Length of need (in months): 3 mos    Patient condition with qualifying saturation Other - List qualifying diagnosis and code COVID pneumonitits   Select a diagnosis & list the code in the comments COVID [[id number]]    Height: 5' 2" (1.575 m)    Weight: 59 kg (130 lb 1.1 oz)    Alternative treatment measures have been tried or considered and deemed clinically ineffective. Yes        Significant Diagnostic Studies: N/A    Pending Diagnostic Studies:       None           Medications:  Reconciled Home Medications:      Medication List        START taking these medications      benzonatate 100 MG capsule  Commonly known as: TESSALON  Take 1 capsule (100 mg total) by mouth 3 (three) times daily as needed for Cough.     doxycycline 100 MG tablet  Commonly known as: VIBRA-TABS  Take 1 tablet (100 mg total) by mouth every 12 (twelve) hours. for 3 days     ondansetron 4 MG Tbdl  Commonly known as: ZOFRAN-ODT  Take 1 tablet (4 mg total) by mouth every 6 (six) hours as needed (nausea).     predniSONE 20 MG tablet  Commonly known as: DELTASONE  Take 2 tablets (40 mg total) by mouth once daily. for 3 days            CONTINUE taking these medications      clonazePAM 2 MG Tab  Commonly known as: KlonoPIN  Take 1-2 mg by mouth every evening.     doxepin 25 MG capsule  Commonly known as: SINEQUAN  Take 25 mg by mouth " every evening.     EScitalopram oxalate 20 MG tablet  Commonly known as: LEXAPRO  Take 20 mg by mouth every morning.     IMODIUM A-D ORAL  Take 2 mg by mouth daily as needed.     lamoTRIgine 150 MG Tab  Commonly known as: LAMICTAL  Take 150 mg by mouth 2 (two) times daily.     traZODone 150 MG tablet  Commonly known as: DESYREL  Take 150 mg by mouth every evening.            STOP taking these medications      azelastine 137 mcg (0.1 %) nasal spray  Commonly known as: ASTELIN     labetaloL 200 MG tablet  Commonly known as: NORMODYNE     MYRBETRIQ 50 mg Tb24  Generic drug: mirabegron     pantoprazole 40 MG tablet  Commonly known as: PROTONIX     tamsulosin 0.4 mg Cap  Commonly known as: FLOMAX     traMADoL 50 mg tablet  Commonly known as: ULTRAM              Indwelling Lines/Drains at time of discharge:   Lines/Drains/Airways       None                   Time spent on the discharge of patient: 40 minutes         Boby Daniels MD  Department of Hospital Medicine  [facility]

## 2023-11-22 NOTE — PLAN OF CARE
Home oxygen set up through [facility]. E-cylinder delivered to patient's bedside by this CM and patient educated on oxygen equipment use and safety. Home oxygen equipment set up in home by [facility] delivery. Patient declined home health. Patient will transport home via private vehicle with family.  Discharge orders and chart reviewed with no further post-acute discharge needs identified at this time.  At this time, patient is cleared for discharge from Case Management standpoint.        11/22/23 1433   Final Note   Assessment Type Final Discharge Note   Anticipated Discharge Disposition Home   Hospital Resources/Appts/Education Provided   (Unable to contact patient's PCP. Patient states she will call and schedule a follow up appointment within the next 2 weeks.)   Post-Acute Status   Post-Acute Authorization [facility] Status Set-up Complete/Auth obtained   Discharge Delays None known at this time

## 2023-11-22 NOTE — HOSPITAL COURSE
Patient admitted with COVID pneumonitis.  Patient was treated with IV dexamethasone, doxycycline, and breathing treatments.  Cultures no growth.  Patient did require 2 L oxygen during stay.  Patient did qualify for home oxygen.  We will discharge home with oxygen.  We will continue with p.o. doxycycline and prednisone on discharge.  Patient discharged home on November 22nd.

## 2023-11-22 NOTE — PT/OT/SLP PROGRESS
Physical Therapy      Patient Name:  Amanda Pereira   MRN:  [id number]    Patient not seen today secondary to Other (Comment) (Prepping for discharge home w/ Discharge Summary in chart.). Will follow-up 11/24/23 if not discharged as anticipated  .

## 2023-11-22 NOTE — CARE UPDATE
11/22/23 1017   Home Oxygen Qualification   $ Home O2 Qualification Tech time 15 minutes   Room Air SpO2 At Rest 90 %   Room Air SpO2 During Ambulation (!) 86 %   SpO2 During Ambulation on O2 93 %   Ambulation O2 LPM 2 LPM   SpO2 Post Ambulation 93 %   Post Ambulation O2 LPM 2 LPM

## 2023-11-25 LAB
BACTERIA BLD CULT: NORMAL
BACTERIA BLD CULT: NORMAL

## 2023-11-26 ENCOUNTER — HOSPITAL ENCOUNTER (INPATIENT)
Facility: HOSPITAL | Age: 78
LOS: 5 days | Discharge: HOME HEALTH | DRG: 189 | End: 2023-12-01
Attending: EMERGENCY MEDICINE | Admitting: INTERNAL MEDICINE
Payer: MEDICARE

## 2023-11-26 DIAGNOSIS — U07.1 COVID-19: ICD-10-CM

## 2023-11-26 DIAGNOSIS — R55 SYNCOPE: ICD-10-CM

## 2023-11-26 DIAGNOSIS — R07.9 CHEST PAIN: ICD-10-CM

## 2023-11-26 DIAGNOSIS — R06.09 DOE (DYSPNEA ON EXERTION): ICD-10-CM

## 2023-11-26 DIAGNOSIS — J96.01 ACUTE HYPOXEMIC RESPIRATORY FAILURE: Primary | ICD-10-CM

## 2023-11-26 PROBLEM — R09.02 HYPOXIA: Status: ACTIVE | Noted: 2023-11-26

## 2023-11-26 LAB
ALBUMIN SERPL BCP-MCNC: 3.3 G/DL (ref 3.5–5.2)
ALLENS TEST: ABNORMAL
ALP SERPL-CCNC: 88 U/L (ref 55–135)
ALT SERPL W/O P-5'-P-CCNC: 34 U/L (ref 10–44)
AMPHET+METHAMPHET UR QL: NEGATIVE
ANION GAP SERPL CALC-SCNC: 8 MMOL/L (ref 8–16)
AST SERPL-CCNC: 34 U/L (ref 10–40)
BACTERIA #/AREA URNS HPF: NEGATIVE /HPF
BARBITURATES UR QL SCN>200 NG/ML: NEGATIVE
BASOPHILS # BLD AUTO: 0.02 K/UL (ref 0–0.2)
BASOPHILS NFR BLD: 0.2 % (ref 0–1.9)
BENZODIAZ UR QL SCN>200 NG/ML: NEGATIVE
BILIRUB SERPL-MCNC: 0.7 MG/DL (ref 0.1–1)
BILIRUB UR QL STRIP: NEGATIVE
BNP SERPL-MCNC: 126 PG/ML (ref 0–99)
BUN SERPL-MCNC: 12 MG/DL (ref 8–23)
BZE UR QL SCN: NEGATIVE
CALCIUM SERPL-MCNC: 7.9 MG/DL (ref 8.7–10.5)
CANNABINOIDS UR QL SCN: NEGATIVE
CHLORIDE SERPL-SCNC: 98 MMOL/L (ref 95–110)
CK SERPL-CCNC: 35 U/L (ref 20–180)
CLARITY UR: CLEAR
CO2 SERPL-SCNC: 25 MMOL/L (ref 23–29)
COLOR UR: YELLOW
CREAT SERPL-MCNC: 0.7 MG/DL (ref 0.5–1.4)
CREAT UR-MCNC: 122.2 MG/DL (ref 15–325)
D DIMER PPP IA.FEU-MCNC: 0.99 MG/L FEU (ref 0–0.49)
DELSYS: ABNORMAL
DIFFERENTIAL METHOD: ABNORMAL
EOSINOPHIL # BLD AUTO: 0 K/UL (ref 0–0.5)
EOSINOPHIL NFR BLD: 0.2 % (ref 0–8)
ERYTHROCYTE [DISTWIDTH] IN BLOOD BY AUTOMATED COUNT: 14.5 % (ref 11.5–14.5)
EST. GFR  (NO RACE VARIABLE): >60 ML/MIN/1.73 M^2
FLOW: 4
GLUCOSE SERPL-MCNC: 119 MG/DL (ref 70–110)
GLUCOSE SERPL-MCNC: 129 MG/DL (ref 70–110)
GLUCOSE UR QL STRIP: NEGATIVE
HCO3 UR-SCNC: 28.4 MMOL/L (ref 24–28)
HCT VFR BLD AUTO: 38.3 % (ref 37–48.5)
HGB BLD-MCNC: 12.5 G/DL (ref 12–16)
HGB UR QL STRIP: NEGATIVE
HYALINE CASTS #/AREA URNS LPF: 3 /LPF
IMM GRANULOCYTES # BLD AUTO: 0.06 K/UL (ref 0–0.04)
IMM GRANULOCYTES NFR BLD AUTO: 0.6 % (ref 0–0.5)
KETONES UR QL STRIP: ABNORMAL
LACTATE SERPL-SCNC: 1 MMOL/L (ref 0.5–1.9)
LEUKOCYTE ESTERASE UR QL STRIP: NEGATIVE
LIPASE SERPL-CCNC: 16 U/L (ref 4–60)
LYMPHOCYTES # BLD AUTO: 1.4 K/UL (ref 1–4.8)
LYMPHOCYTES NFR BLD: 14.6 % (ref 18–48)
MAGNESIUM SERPL-MCNC: 1.7 MG/DL (ref 1.6–2.6)
MAGNESIUM SERPL-MCNC: 1.7 MG/DL (ref 1.6–2.6)
MCH RBC QN AUTO: 29.1 PG (ref 27–31)
MCHC RBC AUTO-ENTMCNC: 32.6 G/DL (ref 32–36)
MCV RBC AUTO: 89 FL (ref 82–98)
MICROSCOPIC COMMENT: ABNORMAL
MODE: ABNORMAL
MONOCYTES # BLD AUTO: 1.2 K/UL (ref 0.3–1)
MONOCYTES NFR BLD: 12.3 % (ref 4–15)
NEUTROPHILS # BLD AUTO: 7.1 K/UL (ref 1.8–7.7)
NEUTROPHILS NFR BLD: 72.1 % (ref 38–73)
NITRITE UR QL STRIP: NEGATIVE
NRBC BLD-RTO: 0 /100 WBC
OPIATES UR QL SCN: NEGATIVE
OSMOLALITY UR: 170 MOSM/KG (ref 50–1200)
OSMOLALITY UR: 170 MOSM/KG (ref 50–1200)
PCO2 BLDA: 43.7 MMHG (ref 35–45)
PCP UR QL SCN>25 NG/ML: NEGATIVE
PH SMN: 7.42 [PH] (ref 7.35–7.45)
PH UR STRIP: 8 [PH] (ref 5–8)
PLATELET # BLD AUTO: 217 K/UL (ref 150–450)
PMV BLD AUTO: 10.7 FL (ref 9.2–12.9)
PO2 BLDA: 57 MMHG (ref 40–60)
POC BE: 4 MMOL/L
POC SATURATED O2: 90 % (ref 95–100)
POC TCO2: 30 MMOL/L (ref 24–29)
POTASSIUM SERPL-SCNC: 3.7 MMOL/L (ref 3.5–5.1)
PROCALCITONIN SERPL IA-MCNC: 0.11 NG/ML (ref 0–0.5)
PROT SERPL-MCNC: 5.9 G/DL (ref 6–8.4)
PROT UR QL STRIP: ABNORMAL
RBC # BLD AUTO: 4.29 M/UL (ref 4–5.4)
RBC #/AREA URNS HPF: 3 /HPF (ref 0–4)
SAMPLE: ABNORMAL
SITE: ABNORMAL
SODIUM SERPL-SCNC: 131 MMOL/L (ref 136–145)
SODIUM SERPL-SCNC: 138 MMOL/L (ref 136–145)
SODIUM UR-SCNC: 184 MMOL/L (ref 20–250)
SODIUM UR-SCNC: 41 MMOL/L (ref 20–250)
SP GR UR STRIP: 1.02 (ref 1–1.03)
SQUAMOUS #/AREA URNS HPF: 2 /HPF
TOXICOLOGY INFORMATION: NORMAL
TROPONIN I SERPL HS-MCNC: 7.3 PG/ML (ref 0–14.9)
TROPONIN I SERPL HS-MCNC: 7.4 PG/ML (ref 0–14.9)
TROPONIN I SERPL HS-MCNC: 7.7 PG/ML (ref 0–14.9)
TSH SERPL DL<=0.005 MIU/L-ACNC: 0.97 UIU/ML (ref 0.34–5.6)
URATE SERPL-MCNC: 2.4 MG/DL (ref 2.4–5.7)
URN SPEC COLLECT METH UR: ABNORMAL
UROBILINOGEN UR STRIP-ACNC: ABNORMAL EU/DL
WBC # BLD AUTO: 9.8 K/UL (ref 3.9–12.7)
WBC #/AREA URNS HPF: 1 /HPF (ref 0–5)

## 2023-11-26 PROCEDURE — 94761 N-INVAS EAR/PLS OXIMETRY MLT: CPT | Mod: XB

## 2023-11-26 PROCEDURE — 82803 BLOOD GASES ANY COMBINATION: CPT

## 2023-11-26 PROCEDURE — 83880 ASSAY OF NATRIURETIC PEPTIDE: CPT | Performed by: EMERGENCY MEDICINE

## 2023-11-26 PROCEDURE — 25000003 PHARM REV CODE 250: Performed by: INTERNAL MEDICINE

## 2023-11-26 PROCEDURE — 82550 ASSAY OF CK (CPK): CPT | Performed by: EMERGENCY MEDICINE

## 2023-11-26 PROCEDURE — 80053 COMPREHEN METABOLIC PANEL: CPT | Performed by: EMERGENCY MEDICINE

## 2023-11-26 PROCEDURE — 87040 BLOOD CULTURE FOR BACTERIA: CPT | Performed by: EMERGENCY MEDICINE

## 2023-11-26 PROCEDURE — 93005 ELECTROCARDIOGRAM TRACING: CPT | Performed by: INTERNAL MEDICINE

## 2023-11-26 PROCEDURE — 99900031 HC PATIENT EDUCATION (STAT)

## 2023-11-26 PROCEDURE — 83605 ASSAY OF LACTIC ACID: CPT | Performed by: EMERGENCY MEDICINE

## 2023-11-26 PROCEDURE — 83935 ASSAY OF URINE OSMOLALITY: CPT | Performed by: INTERNAL MEDICINE

## 2023-11-26 PROCEDURE — 93010 ELECTROCARDIOGRAM REPORT: CPT | Mod: ,,, | Performed by: INTERNAL MEDICINE

## 2023-11-26 PROCEDURE — 99900035 HC TECH TIME PER 15 MIN (STAT)

## 2023-11-26 PROCEDURE — 84443 ASSAY THYROID STIM HORMONE: CPT | Performed by: EMERGENCY MEDICINE

## 2023-11-26 PROCEDURE — 96360 HYDRATION IV INFUSION INIT: CPT

## 2023-11-26 PROCEDURE — 84300 ASSAY OF URINE SODIUM: CPT | Performed by: EMERGENCY MEDICINE

## 2023-11-26 PROCEDURE — 85379 FIBRIN DEGRADATION QUANT: CPT | Performed by: INTERNAL MEDICINE

## 2023-11-26 PROCEDURE — 84550 ASSAY OF BLOOD/URIC ACID: CPT | Performed by: INTERNAL MEDICINE

## 2023-11-26 PROCEDURE — 96361 HYDRATE IV INFUSION ADD-ON: CPT

## 2023-11-26 PROCEDURE — 84300 ASSAY OF URINE SODIUM: CPT | Mod: 91 | Performed by: INTERNAL MEDICINE

## 2023-11-26 PROCEDURE — 25500020 PHARM REV CODE 255: Performed by: EMERGENCY MEDICINE

## 2023-11-26 PROCEDURE — 80307 DRUG TEST PRSMV CHEM ANLYZR: CPT | Performed by: EMERGENCY MEDICINE

## 2023-11-26 PROCEDURE — 63600175 PHARM REV CODE 636 W HCPCS: Performed by: INTERNAL MEDICINE

## 2023-11-26 PROCEDURE — 84295 ASSAY OF SERUM SODIUM: CPT | Performed by: INTERNAL MEDICINE

## 2023-11-26 PROCEDURE — 83690 ASSAY OF LIPASE: CPT | Performed by: EMERGENCY MEDICINE

## 2023-11-26 PROCEDURE — 93010 EKG 12-LEAD: ICD-10-PCS | Mod: ,,, | Performed by: INTERNAL MEDICINE

## 2023-11-26 PROCEDURE — 84145 PROCALCITONIN (PCT): CPT | Performed by: EMERGENCY MEDICINE

## 2023-11-26 PROCEDURE — 83735 ASSAY OF MAGNESIUM: CPT | Performed by: EMERGENCY MEDICINE

## 2023-11-26 PROCEDURE — 21400001 HC TELEMETRY ROOM

## 2023-11-26 PROCEDURE — 36415 COLL VENOUS BLD VENIPUNCTURE: CPT | Performed by: EMERGENCY MEDICINE

## 2023-11-26 PROCEDURE — 81001 URINALYSIS AUTO W/SCOPE: CPT | Performed by: EMERGENCY MEDICINE

## 2023-11-26 PROCEDURE — 63600175 PHARM REV CODE 636 W HCPCS: Performed by: EMERGENCY MEDICINE

## 2023-11-26 PROCEDURE — 84484 ASSAY OF TROPONIN QUANT: CPT | Performed by: EMERGENCY MEDICINE

## 2023-11-26 PROCEDURE — 99285 EMERGENCY DEPT VISIT HI MDM: CPT | Mod: 25

## 2023-11-26 PROCEDURE — 36415 COLL VENOUS BLD VENIPUNCTURE: CPT | Performed by: INTERNAL MEDICINE

## 2023-11-26 PROCEDURE — 85025 COMPLETE CBC W/AUTO DIFF WBC: CPT | Performed by: EMERGENCY MEDICINE

## 2023-11-26 PROCEDURE — 63600175 PHARM REV CODE 636 W HCPCS: Mod: JZ,TB | Performed by: INTERNAL MEDICINE

## 2023-11-26 PROCEDURE — 27000221 HC OXYGEN, UP TO 24 HOURS

## 2023-11-26 PROCEDURE — 25000003 PHARM REV CODE 250: Performed by: EMERGENCY MEDICINE

## 2023-11-26 RX ORDER — SODIUM CHLORIDE 9 MG/ML
INJECTION, SOLUTION INTRAVENOUS CONTINUOUS
Status: DISCONTINUED | OUTPATIENT
Start: 2023-11-26 | End: 2023-11-30

## 2023-11-26 RX ORDER — CLONAZEPAM 1 MG/1
1 TABLET ORAL NIGHTLY PRN
Status: DISCONTINUED | OUTPATIENT
Start: 2023-11-26 | End: 2023-12-01 | Stop reason: HOSPADM

## 2023-11-26 RX ORDER — NALOXONE HCL 0.4 MG/ML
0.02 VIAL (ML) INJECTION
Status: DISCONTINUED | OUTPATIENT
Start: 2023-11-26 | End: 2023-12-01 | Stop reason: HOSPADM

## 2023-11-26 RX ORDER — ACETAMINOPHEN 500 MG
1000 TABLET ORAL
Status: COMPLETED | OUTPATIENT
Start: 2023-11-26 | End: 2023-11-26

## 2023-11-26 RX ORDER — VANCOMYCIN HCL IN 5 % DEXTROSE 1G/250ML
1000 PLASTIC BAG, INJECTION (ML) INTRAVENOUS
Status: DISCONTINUED | OUTPATIENT
Start: 2023-11-27 | End: 2023-11-29

## 2023-11-26 RX ORDER — DOXEPIN HYDROCHLORIDE 25 MG/1
25 CAPSULE ORAL NIGHTLY
Status: DISCONTINUED | OUTPATIENT
Start: 2023-11-26 | End: 2023-12-01 | Stop reason: HOSPADM

## 2023-11-26 RX ORDER — IBUPROFEN 200 MG
24 TABLET ORAL
Status: DISCONTINUED | OUTPATIENT
Start: 2023-11-26 | End: 2023-12-01 | Stop reason: HOSPADM

## 2023-11-26 RX ORDER — ONDANSETRON 2 MG/ML
4 INJECTION INTRAMUSCULAR; INTRAVENOUS EVERY 6 HOURS PRN
Status: DISCONTINUED | OUTPATIENT
Start: 2023-11-26 | End: 2023-12-01 | Stop reason: HOSPADM

## 2023-11-26 RX ORDER — ONDANSETRON 4 MG/1
4 TABLET, ORALLY DISINTEGRATING ORAL EVERY 6 HOURS PRN
Status: DISCONTINUED | OUTPATIENT
Start: 2023-11-26 | End: 2023-12-01 | Stop reason: HOSPADM

## 2023-11-26 RX ORDER — POTASSIUM CHLORIDE 750 MG/1
10 CAPSULE, EXTENDED RELEASE ORAL ONCE
Status: COMPLETED | OUTPATIENT
Start: 2023-11-26 | End: 2023-11-26

## 2023-11-26 RX ORDER — ENOXAPARIN SODIUM 100 MG/ML
40 INJECTION SUBCUTANEOUS EVERY 24 HOURS
Status: DISCONTINUED | OUTPATIENT
Start: 2023-11-26 | End: 2023-12-01 | Stop reason: HOSPADM

## 2023-11-26 RX ORDER — MAGNESIUM SULFATE 1 G/100ML
1 INJECTION INTRAVENOUS ONCE
Status: COMPLETED | OUTPATIENT
Start: 2023-11-26 | End: 2023-11-26

## 2023-11-26 RX ORDER — ESCITALOPRAM OXALATE 10 MG/1
20 TABLET ORAL EVERY MORNING
Status: DISCONTINUED | OUTPATIENT
Start: 2023-11-27 | End: 2023-12-01 | Stop reason: HOSPADM

## 2023-11-26 RX ORDER — ACETAMINOPHEN 325 MG/1
650 TABLET ORAL EVERY 4 HOURS PRN
Status: DISCONTINUED | OUTPATIENT
Start: 2023-11-26 | End: 2023-12-01 | Stop reason: HOSPADM

## 2023-11-26 RX ORDER — SODIUM CHLORIDE 9 MG/ML
1000 INJECTION, SOLUTION INTRAVENOUS
Status: COMPLETED | OUTPATIENT
Start: 2023-11-26 | End: 2023-11-26

## 2023-11-26 RX ORDER — AMOXICILLIN 250 MG
1 CAPSULE ORAL 2 TIMES DAILY
Status: DISCONTINUED | OUTPATIENT
Start: 2023-11-26 | End: 2023-12-01 | Stop reason: HOSPADM

## 2023-11-26 RX ORDER — ACETAMINOPHEN 325 MG/1
650 TABLET ORAL EVERY 8 HOURS PRN
Status: DISCONTINUED | OUTPATIENT
Start: 2023-11-26 | End: 2023-12-01 | Stop reason: HOSPADM

## 2023-11-26 RX ORDER — SODIUM CHLORIDE 0.9 % (FLUSH) 0.9 %
10 SYRINGE (ML) INJECTION EVERY 12 HOURS PRN
Status: DISCONTINUED | OUTPATIENT
Start: 2023-11-26 | End: 2023-12-01 | Stop reason: HOSPADM

## 2023-11-26 RX ORDER — TALC
6 POWDER (GRAM) TOPICAL NIGHTLY PRN
Status: DISCONTINUED | OUTPATIENT
Start: 2023-11-26 | End: 2023-12-01 | Stop reason: HOSPADM

## 2023-11-26 RX ORDER — IBUPROFEN 200 MG
16 TABLET ORAL
Status: DISCONTINUED | OUTPATIENT
Start: 2023-11-26 | End: 2023-12-01 | Stop reason: HOSPADM

## 2023-11-26 RX ORDER — INSULIN ASPART 100 [IU]/ML
0-5 INJECTION, SOLUTION INTRAVENOUS; SUBCUTANEOUS
Status: DISCONTINUED | OUTPATIENT
Start: 2023-11-26 | End: 2023-12-01 | Stop reason: HOSPADM

## 2023-11-26 RX ORDER — METHYLPREDNISOLONE SOD SUCC 125 MG
125 VIAL (EA) INJECTION
Status: COMPLETED | OUTPATIENT
Start: 2023-11-26 | End: 2023-11-26

## 2023-11-26 RX ORDER — GLUCAGON 1 MG
1 KIT INJECTION
Status: DISCONTINUED | OUTPATIENT
Start: 2023-11-26 | End: 2023-12-01 | Stop reason: HOSPADM

## 2023-11-26 RX ORDER — TRAZODONE HYDROCHLORIDE 50 MG/1
150 TABLET ORAL NIGHTLY
Status: DISCONTINUED | OUTPATIENT
Start: 2023-11-26 | End: 2023-12-01 | Stop reason: HOSPADM

## 2023-11-26 RX ORDER — MUPIROCIN 20 MG/G
OINTMENT TOPICAL 2 TIMES DAILY
Status: DISCONTINUED | OUTPATIENT
Start: 2023-11-26 | End: 2023-12-01 | Stop reason: HOSPADM

## 2023-11-26 RX ORDER — LOPERAMIDE HYDROCHLORIDE 2 MG/1
2 CAPSULE ORAL DAILY PRN
Status: DISCONTINUED | OUTPATIENT
Start: 2023-11-26 | End: 2023-12-01 | Stop reason: HOSPADM

## 2023-11-26 RX ADMIN — ACETAMINOPHEN 1000 MG: 500 TABLET ORAL at 12:11

## 2023-11-26 RX ADMIN — LAMOTRIGINE 150 MG: 100 TABLET ORAL at 09:11

## 2023-11-26 RX ADMIN — VANCOMYCIN HYDROCHLORIDE 1250 MG: 1.25 INJECTION, POWDER, LYOPHILIZED, FOR SOLUTION INTRAVENOUS at 04:11

## 2023-11-26 RX ADMIN — METHYLPREDNISOLONE SODIUM SUCCINATE 125 MG: 125 INJECTION, POWDER, FOR SOLUTION INTRAMUSCULAR; INTRAVENOUS at 06:11

## 2023-11-26 RX ADMIN — METHYLPREDNISOLONE SODIUM SUCCINATE 40 MG: 40 INJECTION, POWDER, FOR SOLUTION INTRAMUSCULAR; INTRAVENOUS at 09:11

## 2023-11-26 RX ADMIN — MUPIROCIN 1 G: 20 OINTMENT TOPICAL at 09:11

## 2023-11-26 RX ADMIN — CLONAZEPAM 1 MG: 1 TABLET ORAL at 09:11

## 2023-11-26 RX ADMIN — IOHEXOL 100 ML: 350 INJECTION, SOLUTION INTRAVENOUS at 01:11

## 2023-11-26 RX ADMIN — SODIUM CHLORIDE 1000 ML: 0.9 INJECTION, SOLUTION INTRAVENOUS at 12:11

## 2023-11-26 RX ADMIN — REMDESIVIR 200 MG: 100 INJECTION, POWDER, LYOPHILIZED, FOR SOLUTION INTRAVENOUS at 09:11

## 2023-11-26 RX ADMIN — POTASSIUM CHLORIDE 10 MEQ: 750 CAPSULE, EXTENDED RELEASE ORAL at 05:11

## 2023-11-26 RX ADMIN — DOXEPIN HYDROCHLORIDE 25 MG: 25 CAPSULE ORAL at 09:11

## 2023-11-26 RX ADMIN — ENOXAPARIN SODIUM 40 MG: 40 INJECTION SUBCUTANEOUS at 05:11

## 2023-11-26 RX ADMIN — SENNOSIDES AND DOCUSATE SODIUM 1 TABLET: 50; 8.6 TABLET ORAL at 09:11

## 2023-11-26 RX ADMIN — MAGNESIUM SULFATE 1 G: 1 INJECTION INTRAVENOUS at 05:11

## 2023-11-26 NOTE — H&P
"[facility] - Emergency Dept    History & Physical      Patient Name: Amanda Pereira  MRN: [id number]  Admission Date: 11/26/2023  Attending Physician: Jeff Hanson MD   Primary Care Provider: Kareem Sosa MD         Patient information was obtained from patient, past medical records, and ER records.     Subjective:     Principal Problem:<principal problem not specified>    Chief Complaint:   Chief Complaint   Patient presents with    COVID-19 Concerns     Recently hospitalized, was discharged home and started feeling worse. Extreme fatigue causing pt to fall this am around 3 am. Denies LOC, + hit head, -blood thinners. Abrasion noted to forehead. C/O worsening SOB. 89% on 2L in triage. Pt's states she has been so weak she couldn't even sit up on her own.         HPI:   77-year-old female, recently admitted to [facility] for covid with hypoxia, treated with steroids, sent home with 2L oxygen and 3 days of prednisone.  Daughter states patient did very well after discharge initially.  Last prednisone use was Friday.  Saturday night patient became worse with body aches, malaise, chills.  At 3:00 a.m. she had been trying to get out of bed, legs became weak, was found on the ground with head bruise.  Daughter thinks she passed out, she was not very "with it" upon being found.  Hematoma discovered on forehead.  Up until that time she had not been eating very much however this is not unusual for her, daughter states "she eats like a bird."     In the ER patient was clinically dehydrated, dry mouth and lips.  Normotensive but tachycardic.  She was requiring 4 L of oxygen to maintain saturations greater than 90%, compared to the 2 L of oxygen Rx'd upon discharge.     In the ER CT PE study with bilateral small pleural effusions, atelectasis, septal thickening possibly due to pulmonary edema, no PE.  CT head and C-spine without acute changes.    Patient to be admitted for COVID pneumonia with syncope, failed " outpatient therapy.      Past Medical History:   Diagnosis Date    Depression     Diabetes mellitus     borderline    Kidney stone        Past Surgical History:   Procedure Laterality Date    CYSTOSCOPY N/A 2022    Procedure: CYSTOSCOPY;  Surgeon: Pepper Salas MD;  Location: [facility];  Service: Urology;  Laterality: N/A;    EXTRACORPOREAL SHOCK WAVE LITHOTRIPSY Left 2022    Procedure: LITHOTRIPSY, ESWL;  Surgeon: Pepper Salas MD;  Location: [facility] OR;  Service: Urology;  Laterality: Left;    RETROGRADE PYELOGRAPHY Left 2022    Procedure: PYELOGRAM, RETROGRADE;  Surgeon: Pepper Salas MD;  Location: [facility] OR;  Service: Urology;  Laterality: Left;    TONSILLECTOMY, ADENOIDECTOMY      TUBAL LIGATION      WISDOM TOOTH EXTRACTION         Review of patient's allergies indicates:   Allergen Reactions    Pcn [penicillins] Hives    Promethazine hcl Other (See Comments)     Gets muscle spasams    Reglan [metoclopramide hcl] Other (See Comments)     Gets muscle spasams       No current facility-administered medications on file prior to encounter.     Current Outpatient Medications on File Prior to Encounter   Medication Sig    clonazePAM (KLONOPIN) 2 MG Tab Take 1-2 mg by mouth every evening.    doxepin (SINEQUAN) 25 MG capsule Take 25 mg by mouth every evening.    EScitalopram oxalate (LEXAPRO) 20 MG tablet Take 20 mg by mouth every morning.    lamoTRIgine (LAMICTAL) 150 MG Tab Take 150 mg by mouth 2 (two) times daily.    loperamide HCl (IMODIUM A-D ORAL) Take 2 mg by mouth daily as needed (Diarrhea).    ondansetron (ZOFRAN-ODT) 4 MG TbDL Take 1 tablet (4 mg total) by mouth every 6 (six) hours as needed (nausea).    traZODone (DESYREL) 150 MG tablet Take 150 mg by mouth every evening.    benzonatate (TESSALON) 100 MG capsule Take 1 capsule (100 mg total) by mouth 3 (three) times daily as needed for Cough.    [] doxycycline (VIBRA-TABS) 100 MG tablet Take 1 tablet (100 mg  total) by mouth every 12 (twelve) hours. for 3 days    [] predniSONE (DELTASONE) 20 MG tablet Take 2 tablets (40 mg total) by mouth once daily. for 3 days     Family History       Problem Relation (Age of Onset)    Coronary artery disease Father    Diabetes Maternal Grandmother    Thyroid disease Sister    Urolithiasis Mother          Tobacco Use    Smoking status: Never    Smokeless tobacco: Never   Substance and Sexual Activity    Alcohol use: No    Drug use: No    Sexual activity: Not Currently     Review of Systems  Objective:     Vital Signs (Most Recent):  Temp: 97.7 °F (36.5 °C) (23 1540)  Pulse: 79 (23 1540)  Resp: (!) 24 (23 1540)  BP: (!) 92/51 (23 1540)  SpO2: (!) 92 % (23 1540) Vital Signs (24h Range):  Temp:  [97.7 °F (36.5 °C)-99.9 °F (37.7 °C)] 97.7 °F (36.5 °C)  Pulse:  [] 79  Resp:  [14-25] 24  SpO2:  [90 %-93 %] 92 %  BP: ()/(51-70) 92/51     Weight: 59 kg (130 lb)  Body mass index is 23.78 kg/m².    Physical Exam  Constitutional:       Comments: Somnolent but arousable to speech   HENT:      Head:      Comments: Forehead bruise     Mouth/Throat:      Mouth: Mucous membranes are dry.      Comments: Dry crusted lips  Cardiovascular:      Rate and Rhythm: Normal rate and regular rhythm.      Pulses: Normal pulses.      Heart sounds: Normal heart sounds.   Pulmonary:      Effort: Respiratory distress present.   Abdominal:      General: Abdomen is flat.      Palpations: Abdomen is soft.   Skin:     General: Skin is warm and dry.      Capillary Refill: Capillary refill takes less than 2 seconds.   Neurological:      General: No focal deficit present.      Mental Status: She is oriented to person, place, and time.            Significant Labs: All pertinent labs within the past 24 hours have been reviewed.    Significant Imaging: I have reviewed all pertinent imaging results/findings within the past 24 hours.    Assessment/Plan:     There are no hospital  problems to display for this patient.    VTE Risk Mitigation (From admission, onward)           Ordered     enoxaparin injection 40 mg  Daily         11/26/23 1505     IP VTE HIGH RISK PATIENT  Once         11/26/23 1504     Place sequential compression device  Until discontinued         11/26/23 1504                    COVID pneumonitis/hypoxia, seems consistent with cytokine storm, respiratory issues worsened after stopping prednisone  Atelectasis  -has not recently been vaccinated nor did she get remdesivir recently or Paxlovid  -remdesivir  -2L-->4L  -no PE  -continue with Solu-Medrol, empiric antibiotics  -check procalcitonin  -incentive spirometer  -Lovenox prophylaxis    Hypovolemic hyponatremia  -normal saline at 75 cc/hour x1 day    Metabolic encephalopathy  -COVID and head trauma related  -delirium precautions  -check b12  -monitor    Unwitnessed Syncope  -likely secondary to dehydration  -fall precautions, PT OT eval    Head trauma  -CT head, C-spine without acute changes  -monitor for neurologic changes    PMHx: DM, Depression, renal stone  -c/w home meds as ordered    Lovenox prophylaxis  High-risk      Jeff Hanson MD  Department of Hospital Medicine   [facility] - Emergency Dept

## 2023-11-26 NOTE — PHARMACY MED REC
"Admission Medication History     The home medication history was taken by Yunier Dutta.    You may go to "Admission" then "Reconcile Home Medications" tabs to review and/or act upon these items.     The home medication list has been updated by the Pharmacy department.   Please read ALL comments highlighted in yellow.   Please address this information as you see fit.    Feel free to contact us if you have any questions or require assistance.      Medications listed below were obtained from: Patient/family and Analytic software- [employer]  No current facility-administered medications on file prior to encounter.     Current Outpatient Medications on File Prior to Encounter   Medication Sig Dispense Refill    clonazePAM (KLONOPIN) 2 MG Tab Take 1-2 mg by mouth every evening.      doxepin (SINEQUAN) 25 MG capsule Take 25 mg by mouth every evening.      EScitalopram oxalate (LEXAPRO) 20 MG tablet Take 20 mg by mouth every morning.      lamoTRIgine (LAMICTAL) 150 MG Tab Take 150 mg by mouth 2 (two) times daily.      loperamide HCl (IMODIUM A-D ORAL) Take 2 mg by mouth daily as needed (Diarrhea).      ondansetron (ZOFRAN-ODT) 4 MG TbDL Take 1 tablet (4 mg total) by mouth every 6 (six) hours as needed (nausea). 20 tablet 0    traZODone (DESYREL) 150 MG tablet Take 150 mg by mouth every evening.      benzonatate (TESSALON) 100 MG capsule Take 1 capsule (100 mg total) by mouth 3 (three) times daily as needed for Cough. 30 capsule 0    [] doxycycline (VIBRA-TABS) 100 MG tablet Take 1 tablet (100 mg total) by mouth every 12 (twelve) hours. for 3 days 6 tablet 0    [] predniSONE (DELTASONE) 20 MG tablet Take 2 tablets (40 mg total) by mouth once daily. for 3 days 6 tablet 0         Yunier Dutta                  .          "

## 2023-11-27 LAB
ANION GAP SERPL CALC-SCNC: 6 MMOL/L (ref 8–16)
BASOPHILS # BLD AUTO: 0.01 K/UL (ref 0–0.2)
BASOPHILS NFR BLD: 0.2 % (ref 0–1.9)
BUN SERPL-MCNC: 12 MG/DL (ref 8–23)
CALCIUM SERPL-MCNC: 7.3 MG/DL (ref 8.7–10.5)
CHLORIDE SERPL-SCNC: 109 MMOL/L (ref 95–110)
CO2 SERPL-SCNC: 25 MMOL/L (ref 23–29)
CREAT SERPL-MCNC: 0.6 MG/DL (ref 0.5–1.4)
CRP SERPL-MCNC: 82.2 MG/L (ref 0–8.2)
DIFFERENTIAL METHOD: ABNORMAL
EOSINOPHIL # BLD AUTO: 0 K/UL (ref 0–0.5)
EOSINOPHIL NFR BLD: 0 % (ref 0–8)
ERYTHROCYTE [DISTWIDTH] IN BLOOD BY AUTOMATED COUNT: 14.6 % (ref 11.5–14.5)
EST. GFR  (NO RACE VARIABLE): >60 ML/MIN/1.73 M^2
GLUCOSE SERPL-MCNC: 188 MG/DL (ref 70–110)
HCT VFR BLD AUTO: 40 % (ref 37–48.5)
HGB BLD-MCNC: 12.8 G/DL (ref 12–16)
IMM GRANULOCYTES # BLD AUTO: 0.07 K/UL (ref 0–0.04)
IMM GRANULOCYTES NFR BLD AUTO: 1.2 % (ref 0–0.5)
LYMPHOCYTES # BLD AUTO: 0.8 K/UL (ref 1–4.8)
LYMPHOCYTES NFR BLD: 13.9 % (ref 18–48)
MAGNESIUM SERPL-MCNC: 2.2 MG/DL (ref 1.6–2.6)
MCH RBC QN AUTO: 29.2 PG (ref 27–31)
MCHC RBC AUTO-ENTMCNC: 32 G/DL (ref 32–36)
MCV RBC AUTO: 91 FL (ref 82–98)
MONOCYTES # BLD AUTO: 0.3 K/UL (ref 0.3–1)
MONOCYTES NFR BLD: 4.7 % (ref 4–15)
NEUTROPHILS # BLD AUTO: 4.8 K/UL (ref 1.8–7.7)
NEUTROPHILS NFR BLD: 80 % (ref 38–73)
NRBC BLD-RTO: 0 /100 WBC
PLATELET # BLD AUTO: 241 K/UL (ref 150–450)
PMV BLD AUTO: 10.7 FL (ref 9.2–12.9)
POTASSIUM SERPL-SCNC: 3.3 MMOL/L (ref 3.5–5.1)
RBC # BLD AUTO: 4.38 M/UL (ref 4–5.4)
SODIUM SERPL-SCNC: 140 MMOL/L (ref 136–145)
WBC # BLD AUTO: 5.95 K/UL (ref 3.9–12.7)

## 2023-11-27 PROCEDURE — 25000003 PHARM REV CODE 250: Performed by: STUDENT IN AN ORGANIZED HEALTH CARE EDUCATION/TRAINING PROGRAM

## 2023-11-27 PROCEDURE — 25000003 PHARM REV CODE 250: Performed by: INTERNAL MEDICINE

## 2023-11-27 PROCEDURE — 86140 C-REACTIVE PROTEIN: CPT | Performed by: INTERNAL MEDICINE

## 2023-11-27 PROCEDURE — 85025 COMPLETE CBC W/AUTO DIFF WBC: CPT | Performed by: INTERNAL MEDICINE

## 2023-11-27 PROCEDURE — 27000221 HC OXYGEN, UP TO 24 HOURS

## 2023-11-27 PROCEDURE — 21400001 HC TELEMETRY ROOM

## 2023-11-27 PROCEDURE — 94640 AIRWAY INHALATION TREATMENT: CPT

## 2023-11-27 PROCEDURE — 80048 BASIC METABOLIC PNL TOTAL CA: CPT | Performed by: INTERNAL MEDICINE

## 2023-11-27 PROCEDURE — 99900035 HC TECH TIME PER 15 MIN (STAT)

## 2023-11-27 PROCEDURE — 25000242 PHARM REV CODE 250 ALT 637 W/ HCPCS: Performed by: INTERNAL MEDICINE

## 2023-11-27 PROCEDURE — 94761 N-INVAS EAR/PLS OXIMETRY MLT: CPT

## 2023-11-27 PROCEDURE — 94799 UNLISTED PULMONARY SVC/PX: CPT

## 2023-11-27 PROCEDURE — 97165 OT EVAL LOW COMPLEX 30 MIN: CPT

## 2023-11-27 PROCEDURE — 97116 GAIT TRAINING THERAPY: CPT

## 2023-11-27 PROCEDURE — 97161 PT EVAL LOW COMPLEX 20 MIN: CPT

## 2023-11-27 PROCEDURE — 99900031 HC PATIENT EDUCATION (STAT)

## 2023-11-27 PROCEDURE — 36415 COLL VENOUS BLD VENIPUNCTURE: CPT | Performed by: INTERNAL MEDICINE

## 2023-11-27 PROCEDURE — 63600175 PHARM REV CODE 636 W HCPCS: Performed by: INTERNAL MEDICINE

## 2023-11-27 PROCEDURE — 97535 SELF CARE MNGMENT TRAINING: CPT

## 2023-11-27 PROCEDURE — 83735 ASSAY OF MAGNESIUM: CPT | Performed by: INTERNAL MEDICINE

## 2023-11-27 RX ORDER — BUDESONIDE 0.5 MG/2ML
0.5 INHALANT ORAL EVERY 12 HOURS
Status: DISCONTINUED | OUTPATIENT
Start: 2023-11-27 | End: 2023-12-01 | Stop reason: HOSPADM

## 2023-11-27 RX ADMIN — POTASSIUM BICARBONATE 35 MEQ: 391 TABLET, EFFERVESCENT ORAL at 01:11

## 2023-11-27 RX ADMIN — REMDESIVIR 100 MG: 100 INJECTION, POWDER, LYOPHILIZED, FOR SOLUTION INTRAVENOUS at 01:11

## 2023-11-27 RX ADMIN — VANCOMYCIN HYDROCHLORIDE 1000 MG: 1 INJECTION, POWDER, LYOPHILIZED, FOR SOLUTION INTRAVENOUS at 05:11

## 2023-11-27 RX ADMIN — MUPIROCIN 1 G: 20 OINTMENT TOPICAL at 09:11

## 2023-11-27 RX ADMIN — SENNOSIDES AND DOCUSATE SODIUM 1 TABLET: 50; 8.6 TABLET ORAL at 09:11

## 2023-11-27 RX ADMIN — DOXEPIN HYDROCHLORIDE 25 MG: 25 CAPSULE ORAL at 09:11

## 2023-11-27 RX ADMIN — CEFTRIAXONE SODIUM 1 G: 1 INJECTION, POWDER, FOR SOLUTION INTRAMUSCULAR; INTRAVENOUS at 12:11

## 2023-11-27 RX ADMIN — ESCITALOPRAM OXALATE 20 MG: 10 TABLET ORAL at 06:11

## 2023-11-27 RX ADMIN — LAMOTRIGINE 150 MG: 100 TABLET ORAL at 09:11

## 2023-11-27 RX ADMIN — POTASSIUM BICARBONATE 35 MEQ: 391 TABLET, EFFERVESCENT ORAL at 09:11

## 2023-11-27 RX ADMIN — SODIUM CHLORIDE: 0.9 INJECTION, SOLUTION INTRAVENOUS at 06:11

## 2023-11-27 RX ADMIN — BUDESONIDE INHALATION 0.5 MG: 0.5 SUSPENSION RESPIRATORY (INHALATION) at 08:11

## 2023-11-27 RX ADMIN — METHYLPREDNISOLONE SODIUM SUCCINATE 40 MG: 40 INJECTION, POWDER, FOR SOLUTION INTRAMUSCULAR; INTRAVENOUS at 06:11

## 2023-11-27 RX ADMIN — ENOXAPARIN SODIUM 40 MG: 40 INJECTION SUBCUTANEOUS at 05:11

## 2023-11-27 RX ADMIN — METHYLPREDNISOLONE SODIUM SUCCINATE 40 MG: 40 INJECTION, POWDER, FOR SOLUTION INTRAMUSCULAR; INTRAVENOUS at 09:11

## 2023-11-27 RX ADMIN — METHYLPREDNISOLONE SODIUM SUCCINATE 40 MG: 40 INJECTION, POWDER, FOR SOLUTION INTRAMUSCULAR; INTRAVENOUS at 01:11

## 2023-11-27 RX ADMIN — CEFTRIAXONE SODIUM 1 G: 1 INJECTION, POWDER, FOR SOLUTION INTRAMUSCULAR; INTRAVENOUS at 11:11

## 2023-11-27 NOTE — CARE UPDATE
""Good afternoon! As a service of the Antimicrobial Stewardship program, Dr. Sena and I have reviewed your patient's chart. Your patient is receiving Ceftriaxone, Vancomycin, and Remdesivir. Recommend MRSA screen and if negative, discontinue Vancomycin. Remdesivir unlikely to be of any benefit due to 11 days post symptom onset. Recommend budesonide aerosols. Thank you! "    Remdesivir will be discontinued, and Budesonide started, with MRSA screen ordered.based on this opinion  "

## 2023-11-27 NOTE — PROGRESS NOTES
"[facility] Medicine  Progress Note    Patient Name: Amanda Pereira  MRN: [id number]  Patient Class: IP- Inpatient   Admission Date: 11/26/2023  Length of Stay: 1 days  Attending Physician: Timothy Smith MD  Primary Care Provider: Kareem Sosa MD        Subjective:     Principal Problem:Hypoxia        HPI:  77-year-old female, recently admitted to [facility] for covid with hypoxia, treated with steroids, sent home with 2L oxygen and 3 days of prednisone.  Daughter states patient did very well after discharge initially.  Last prednisone use was Friday.  Saturday night patient became worse with body aches, malaise, chills.  At 3:00 a.m. she had been trying to get out of bed, legs became weak, was found on the ground with head bruise.  Daughter thinks she passed out, she was not very "with it" upon being found.  Hematoma discovered on forehead.  Up until that time she had not been eating very much however this is not unusual for her, daughter states "she eats like a bird."      In the ER patient was clinically dehydrated, dry mouth and lips.  Normotensive but tachycardic.  She was requiring 4 L of oxygen to maintain saturations greater than 90%, compared to the 2 L of oxygen Rx'd upon discharge.      In the ER CT PE study with bilateral small pleural effusions, atelectasis, septal thickening possibly due to pulmonary edema, no PE.  CT head and C-spine without acute changes.     Patient to be admitted for COVID pneumonia with syncope, failed outpatient therapy.       Overview/Hospital Course:  11/27  Assumed care. Chart reviewed. Labs reviewed and noted below: normal CBC; mild hypokalemia (sliding scale) with normal renal function; cardiac biomarkers are normal, as are TSH and lactate. . Telemetry reviewed: sinus. Feels "Much better" since admission. Requiring 4 lpm to achieve 94% sats. Plan: continue current regimen; AM labs for review    Interval History: COVID pneumonia slowly improving    Review " "Telephone Encounter by Tonny Morganport at 05/25/18 11:58 AM     Author:  Tonny Warrick Service:  (none) Author Type:  Patient      Filed:  05/25/18 11:59 AM Encounter Date:  5/25/2018 Status:  Signed     :  Tonnymeenakshi Wells (Patient )              Rosalina De Los Santos    Patient Age: 33year old    ACCT STATUS:   MESSAGE:[TB1.1T]   Patient is calling and would like to be perscribed some sleeping medication for long flights she will be having, please advise.[TB1.1M]   Next and Last Visit with Provider and Department  Next visit with Aaron Soto is on No match found  Next visit with [address] [place] is on No match found  Last visit with Aaron Soto was on 02/17/2018 at 11:20 AM in [address] [place] [address] visit with [address] Lee was on 02/17/2018 at 11:20 AM in [facility]: As of 02/17/2018 weight is 153 lbs. (69.400 kg). Height is 5' 5.5""(1.664 m). BMI is 25.06 kg/(m^2) calculated from:     Height 5' 5.5\"" (1.664 m) as of 2/17/18     Weight 153 lb (69.4 kg) as of 2/17/18      Allergies      Allergen   Reactions   â¢ Sulfa Antibiotics  Other - See Comments     In childhood      Current outpatient prescriptions       Medication  Sig Dispense Refill   â¢ EMOQUETTE 0.15-30 MG-MCG per tablet TAKE 1 TABLET BY MOUTH DAILY. SKIP PLACEBO TABLETS EXCEPT EVERY THIRD MONTH 84 Tab 1   â¢ OnabotulinumtoxinA (BOTOX IJ) Inject  as directed. Every 3 mo     â¢ vitamin B-12 (CYANOCOBALAMIN) 1000 MCG tablet Take 1 Tab by mouth daily. 30 Tab 0   â¢ SUMAtriptan Succinate 6 MG/0.5ML SOLN Inject  into the skin. Inject 0.5 ML subcutaneous at onsert of migraine. May repeat in 2 hours is needed . No more then 2 dose per day and 2 days per week     â¢ metoprolol (LOPRESSOR) 25 MG tablet Take 25 mg by mouth 2 (two) times daily. â¢ rizatriptan (MAXALT) 10 MG tablet Take 10 mg by mouth as needed.  May repeat in 2 hours if needed     â¢ amitriptyline (ELAVIL) 25 MG PO " TABS take up to 2 pills at night as directed. 60 Tab 5      PHARMACY to use:[TB1.1T] see below[TB1.1M]          Pharmacy preference(s) on file:      [facility] [address] INFO:[TB1.1T] Ok to leave response (including medical information) with family member or on answering machine[TB1.1M]  ROUTING:[TB1.1T] Patient's physician/staff[TB1.1M]        PCP: Natasha Beard MD         INS: Payor: Theodore Mancilla / Plan: *No Plan* / Product Type: *No Product type* / Note: This is the primary coverage, but no account was found for this location or the patient's primary location.    ADDRESS:  [facility] [address][TB1.1T]       Revision History        User Key Date/Time User Provider Type Action    > TB1.1 05/25/18 11:59 AM Aubree Mayer Patient  Sign    M - Manual, T - Template of Systems   Constitutional: Negative.    HENT: Negative.     Eyes: Negative.    Respiratory:  Positive for cough and shortness of breath.    Cardiovascular: Negative.    Gastrointestinal: Negative.    Endocrine: Negative.    Genitourinary: Negative.    Musculoskeletal: Negative.    Skin: Negative.    Allergic/Immunologic: Negative.    Neurological: Negative.    Hematological: Negative.    All other systems reviewed and are negative.    Objective:     Vital Signs (Most Recent):  Temp: 97.6 °F (36.4 °C) (11/27/23 1150)  Pulse: 96 (11/27/23 1150)  Resp: 16 (11/27/23 1150)  BP: (!) 117/59 (11/27/23 1330)  SpO2: (!) 92 % (11/27/23 1330) Vital Signs (24h Range):  Temp:  [97.4 °F (36.3 °C)-98.4 °F (36.9 °C)] 97.6 °F (36.4 °C)  Pulse:  [72-96] 96  Resp:  [13-18] 16  SpO2:  [90 %-95 %] 92 %  BP: ()/(48-63) 117/59     Weight: 64.3 kg (141 lb 12.1 oz)  Body mass index is 25.93 kg/m².    Intake/Output Summary (Last 24 hours) at 11/27/2023 1657  Last data filed at 11/27/2023 1340  Gross per 24 hour   Intake 120 ml   Output 0 ml   Net 120 ml         Physical Exam  Vitals and nursing note reviewed.   Constitutional:       Appearance: She is well-developed.   HENT:      Head: Normocephalic and atraumatic.      Right Ear: External ear normal.      Left Ear: External ear normal.      Nose: Nose normal.   Eyes:      Conjunctiva/sclera: Conjunctivae normal.      Pupils: Pupils are equal, round, and reactive to light.   Cardiovascular:      Rate and Rhythm: Normal rate and regular rhythm.      Heart sounds: Normal heart sounds.   Pulmonary:      Effort: Pulmonary effort is normal.      Breath sounds: Normal breath sounds.      Comments: Decreased entry bases without adventitious sounds  Abdominal:      General: Bowel sounds are normal.      Palpations: Abdomen is soft.   Musculoskeletal:         General: Normal range of motion.      Cervical back: Normal range of motion and neck supple.   Skin:     General: Skin is warm and dry.       Capillary Refill: Capillary refill takes less than 2 seconds.   Neurological:      Mental Status: She is alert and oriented to person, place, and time.   Psychiatric:         Behavior: Behavior normal.         Thought Content: Thought content normal.         Judgment: Judgment normal.             Significant Labs: All pertinent labs within the past 24 hours have been reviewed.  BMP:   Recent Labs   Lab 11/27/23  0507   *      K 3.3*      CO2 25   BUN 12   CREATININE 0.6   CALCIUM 7.3*   MG 2.2     CBC:   Recent Labs   Lab 11/26/23  1324 11/27/23  0507   WBC 9.80 5.95   HGB 12.5 12.8   HCT 38.3 40.0    241       Significant Imaging: I have reviewed all pertinent imaging results/findings within the past 24 hours.    Assessment/Plan:      No notes have been filed under this hospital service.  Service: Hospital Medicine    VTE Risk Mitigation (From admission, onward)           Ordered     enoxaparin injection 40 mg  Daily         11/26/23 1505     IP VTE HIGH RISK PATIENT  Once         11/26/23 1504     Place sequential compression device  Until discontinued         11/26/23 1504                    Discharge Planning   RITA: 11/29/2023     Code Status: Full Code   Is the patient medically ready for discharge?:     Reason for patient still in hospital (select all that apply): Patient trending condition, Laboratory test, and Treatment  Discharge Plan A: Home                  Timothy Smith MD  Department of Hospital Medicine   [facility]

## 2023-11-27 NOTE — PLAN OF CARE
[facility]  Initial Discharge Assessment       Primary Care Provider: Kareem Sosa MD    Admission Diagnosis: Syncope [R55]    Admission Date: 11/26/2023  Expected Discharge Date: 11/29/2023    Transition of Care Barriers: None    Assessment completed at bedside.  Advanced directives not addressed at this time.  Pt intends to discharge home where she lives alone and has the ability to complete ADL's no needs identified at this time.    Payor: [employer] MEDICARE / Plan: [employer] MEDICARE HMO / Product Type: Capitation /     Extended Emergency Contact Information  Primary Emergency Contact: RandallGenevieve  Address: [address] [place] [address] [place] [address] [facility]  Home Phone: [phone]  Work Phone: [phone]  Mobile Phone: [phone]  Relation: Daughter    Discharge Plan A: Home  Discharge Plan B: Home with family      [id number] - [place], [place]  [address]  [address]  [facility] [address]  Phone: [phone] Fax: [phone]    [facility] - [place] - [address]  [address] [address]  [facility] [address]  Phone: [phone] Fax: [phone]    [id number] - [place] - [address]  [address]  [place] [address]  Phone: [phone] Fax: [phone]      Initial Assessment (most recent)       Adult Discharge Assessment - 11/27/23 1155          Discharge Assessment    Assessment Type Discharge Planning Assessment     Confirmed/corrected address, phone number and insurance Yes     Confirmed Demographics Correct on Facesheet     Source of Information patient     Communicated RITA with patient/caregiver Yes     Reason For Admission Hypoxia     People in Home alone     Facility Arrived From: home     Do you expect to return to your current living situation? Yes     Do you have help at home or someone to help you manage your care at home? No     Prior to hospitilization  cognitive status: Alert/Oriented     Current cognitive status: Alert/Oriented     Equipment Currently Used at Home rollator;walker, rolling;cane, quad     Readmission within 30 days? No     Patient currently being followed by outpatient case management? No     Do you currently have service(s) that help you manage your care at home? No     Do you take prescription medications? Yes     Do you have prescription coverage? Yes     Coverage [employer]     Do you have any problems affording any of your prescribed medications? No     Is the patient taking medications as prescribed? yes     Who is going to help you get home at discharge? Genevieve Pereira (Daughter) [phone]     How do you get to doctors appointments? family or friend will provide     Are you on dialysis? No     Do you take coumadin? No     DME Needed Upon Discharge  none     Discharge Plan discussed with: Patient     Transition of Care Barriers None     Discharge Plan A Home     Discharge Plan B Home with family

## 2023-11-27 NOTE — PT/OT/SLP EVAL
Occupational Therapy   Evaluation    Name: Amanda Pereira  MRN: [id number]  Admitting Diagnosis: Hypoxia  Recent Surgery: * No surgery found *      Recommendations:     Discharge Recommendations: Low Intensity Therapy  Discharge Equipment Recommendations:  none  Barriers to discharge:  Other (Comment) (lives alone)    Assessment:     Amanda Pereira is a 77 y.o. female with a medical diagnosis of Hypoxia.  Performance deficits affecting function: weakness, impaired endurance, impaired self care skills, impaired functional mobility, gait instability, impaired balance, impaired cardiopulmonary response to activity.      Pt reported feeling stronger today; she completed bed mobility, ADLs, and functional transfers with minimal to contact guard assistance; pt reported that her daughter will be able to assist her as needed at d/c.     Rehab Prognosis: Fair; patient would benefit from acute skilled OT services to address these deficits and reach maximum level of function.       Plan:     Patient to be seen 3 x/week to address the above listed problems via self-care/home management, therapeutic activities, therapeutic exercises  Plan of Care Expires: 12/27/23  Plan of Care Reviewed with: patient    Subjective     Chief Complaint: none stated  Patient/Family Comments/goals: return home    Occupational Profile:  Living Environment: Lives alone in a 2SH with no steps to enter; bedroom downstairs and bathroom upstairs  Previous level of function: Independent until just prior to re-admit; pt reports her daughter bought her a RW and bedside commode just prior to her getting re-admitted.    Equipment Used at Home: walker, rolling, bedside commode   Assistance upon Discharge: daughter has been assisting    Pain/Comfort:  Pain Rating 1: 0/10  Pain Rating Post-Intervention 1: 0/10    Objective:     Communicated with: nurse prior to session.  Patient found supine with telemetry, pulse ox (continuous) upon OT entry to room.    General  Precautions: Standard, fall, airborne, droplet, contact  Respiratory Status: Nasal cannula, flow 3 L/min    Occupational Performance:    Bed Mobility:    Patient completed Supine to Sit with stand by assistance    Functional Mobility/Transfers:  Pt completed stand pivot transfer EOB to bedside commode and bedside commode to bedside chair with contact guard assistance using rolling walker    Activities of Daily Living:  Lower Body Dressing: minimum assistance to don depends  Toileting: contact guard assistance during clothing management on bedside commode     Cognitive/Visual Perceptual:  Cognitive/Psychosocial Skills:     -       Oriented to: Person, Place, Time, and Situation   -       Follows Commands/attention:Follows multistep  commands    Physical Exam:  Upper Extremity Range of Motion:     -       Right Upper Extremity: WFL  -       Left Upper Extremity: WFL  Upper Extremity Strength:    -       Right Upper Extremity: WFL  -       Left Upper Extremity: WFL   Strength:    -       Right Upper Extremity: WFL  -       Left Upper Extremity: WFL  Fine Motor Coordination:    -       Intact  Gross motor coordination:   WFL    AMPAC 6 Click ADL:  AMPAC Total Score: 21    Treatment & Education:  Therapist provided facilitation and instruction of proper body mechanics and fall prevention strategies during tasks listed above.   Instructed patient to sit in bedside chair as tolerated daily to increase OOB/activity tolerance.   Instructed patient to use call light to have nursing staff assist with needs/transfers.   Discussed OT POC and answered all questions within OT scope of practice.    Patient left up in chair with all lines intact and call button in reach    GOALS:   Multidisciplinary Problems       Occupational Therapy Goals          Problem: Occupational Therapy    Goal Priority Disciplines Outcome Interventions   Occupational Therapy Goal     OT, PT/OT     Description: Goals to be met by: 12/27/23     Patient  will increase functional independence with ADLs by performing:    UE Dressing with Modified [place].  LE Dressing with Modified [place].  Grooming while standing at sink with Modified [place].  Toileting from toilet with Modified [place] for hygiene and clothing management.   Toilet transfer to toilet with Modified [place].                         History:     Past Medical History:   Diagnosis Date    Depression     Diabetes mellitus     borderline    Kidney stone          Past Surgical History:   Procedure Laterality Date    CYSTOSCOPY N/A 9/21/2022    Procedure: CYSTOSCOPY;  Surgeon: Pepper Salas MD;  Location: [facility] OR;  Service: Urology;  Laterality: N/A;    EXTRACORPOREAL SHOCK WAVE LITHOTRIPSY Left 9/21/2022    Procedure: LITHOTRIPSY, ESWL;  Surgeon: Pepper Salas MD;  Location: [facility] OR;  Service: Urology;  Laterality: Left;    RETROGRADE PYELOGRAPHY Left 9/21/2022    Procedure: PYELOGRAM, RETROGRADE;  Surgeon: Pepper Salas MD;  Location: [facility] OR;  Service: Urology;  Laterality: Left;    TONSILLECTOMY, ADENOIDECTOMY      TUBAL LIGATION      WISDOM TOOTH EXTRACTION         Time Tracking:     OT Date of Treatment: 11/27/23  OT Start Time: 1128  OT Stop Time: 1200  OT Total Time (min): 32 min    Billable Minutes:Evaluation 09  Self Care/Home Management 23 11/27/2023

## 2023-11-27 NOTE — CARE UPDATE
11/27/23 1330   Patient Assessment/Suction   Level of Consciousness (AVPU) alert   PRE-TX-O2   Device (Oxygen Therapy) nasal cannula   $ Is the patient on Low Flow Oxygen? Yes   Flow (L/min) 4   SpO2 (!) 92 %   Pulse Oximetry Type Intermittent   $ Pulse Oximetry - Multiple Charge Pulse Oximetry - Multiple   Education   $ Education Other (see comment);15 min

## 2023-11-27 NOTE — SUBJECTIVE & OBJECTIVE
Interval History: COVID pneumonia slowly improving    Review of Systems   Constitutional: Negative.    HENT: Negative.     Eyes: Negative.    Respiratory:  Positive for cough and shortness of breath.    Cardiovascular: Negative.    Gastrointestinal: Negative.    Endocrine: Negative.    Genitourinary: Negative.    Musculoskeletal: Negative.    Skin: Negative.    Allergic/Immunologic: Negative.    Neurological: Negative.    Hematological: Negative.    All other systems reviewed and are negative.    Objective:     Vital Signs (Most Recent):  Temp: 97.6 °F (36.4 °C) (11/27/23 1150)  Pulse: 96 (11/27/23 1150)  Resp: 16 (11/27/23 1150)  BP: (!) 117/59 (11/27/23 1330)  SpO2: (!) 92 % (11/27/23 1330) Vital Signs (24h Range):  Temp:  [97.4 °F (36.3 °C)-98.4 °F (36.9 °C)] 97.6 °F (36.4 °C)  Pulse:  [72-96] 96  Resp:  [13-18] 16  SpO2:  [90 %-95 %] 92 %  BP: ()/(48-63) 117/59     Weight: 64.3 kg (141 lb 12.1 oz)  Body mass index is 25.93 kg/m².    Intake/Output Summary (Last 24 hours) at 11/27/2023 1657  Last data filed at 11/27/2023 1340  Gross per 24 hour   Intake 120 ml   Output 0 ml   Net 120 ml         Physical Exam  Vitals and nursing note reviewed.   Constitutional:       Appearance: She is well-developed.   HENT:      Head: Normocephalic and atraumatic.      Right Ear: External ear normal.      Left Ear: External ear normal.      Nose: Nose normal.   Eyes:      Conjunctiva/sclera: Conjunctivae normal.      Pupils: Pupils are equal, round, and reactive to light.   Cardiovascular:      Rate and Rhythm: Normal rate and regular rhythm.      Heart sounds: Normal heart sounds.   Pulmonary:      Effort: Pulmonary effort is normal.      Breath sounds: Normal breath sounds.      Comments: Decreased entry bases without adventitious sounds  Abdominal:      General: Bowel sounds are normal.      Palpations: Abdomen is soft.   Musculoskeletal:         General: Normal range of motion.      Cervical back: Normal range of motion  and neck supple.   Skin:     General: Skin is warm and dry.      Capillary Refill: Capillary refill takes less than 2 seconds.   Neurological:      Mental Status: She is alert and oriented to person, place, and time.   Psychiatric:         Behavior: Behavior normal.         Thought Content: Thought content normal.         Judgment: Judgment normal.             Significant Labs: All pertinent labs within the past 24 hours have been reviewed.  BMP:   Recent Labs   Lab 11/27/23  0507   *      K 3.3*      CO2 25   BUN 12   CREATININE 0.6   CALCIUM 7.3*   MG 2.2     CBC:   Recent Labs   Lab 11/26/23  1324 11/27/23  0507   WBC 9.80 5.95   HGB 12.5 12.8   HCT 38.3 40.0    241       Significant Imaging: I have reviewed all pertinent imaging results/findings within the past 24 hours.

## 2023-11-27 NOTE — HPI
"77-year-old female, recently admitted to [facility] for covid with hypoxia, treated with steroids, sent home with 2L oxygen and 3 days of prednisone.  Daughter states patient did very well after discharge initially.  Last prednisone use was Friday.  Saturday night patient became worse with body aches, malaise, chills.  At 3:00 a.m. she had been trying to get out of bed, legs became weak, was found on the ground with head bruise.  Daughter thinks she passed out, she was not very "with it" upon being found.  Hematoma discovered on forehead.  Up until that time she had not been eating very much however this is not unusual for her, daughter states "she eats like a bird."      In the ER patient was clinically dehydrated, dry mouth and lips.  Normotensive but tachycardic.  She was requiring 4 L of oxygen to maintain saturations greater than 90%, compared to the 2 L of oxygen Rx'd upon discharge.      In the ER CT PE study with bilateral small pleural effusions, atelectasis, septal thickening possibly due to pulmonary edema, no PE.  CT head and C-spine without acute changes.     Patient to be admitted for COVID pneumonia with syncope, failed outpatient therapy.     "

## 2023-11-27 NOTE — HOSPITAL COURSE
"11/27  Assumed care. Chart reviewed. Labs reviewed and noted below: normal CBC; mild hypokalemia (sliding scale) with normal renal function; cardiac biomarkers are normal, as are TSH and lactate. . Telemetry reviewed: sinus. Feels "Much better" since admission. Requiring 4 lpm to achieve 94% sats. Plan: continue current regimen; AM labs for review.    11/28  Resting comfortably in bed. Daughter at bedside. Speaking in sentences. Breathing easily, but requiring 4 lpm to maintain saturations (normally uses 2 lpm at home). No orthopnea. Has dry cough. No CP. Remdesivir was discontinued yesterday (was day 11 after diagnosis). Labs reviewed and noted below: normal CBC, normal electrolytes and renal function. Plan: will continue current regimen and re-evaluate in AM with hopes of discharge on po regimen. Discussed with daughter and patient, both of whom expressed understanding    11/29  Admitted for worsening COVID pneumonia. Remains hypoxic. Not on remdesivir as above. On Solumedrol 40 mg q8h and budesonide neb q12h. Hypoxia not improving. Looks and feels "Good" though. No CP. Does feel SOB(E) after approximately 15-20 feet of walking in room. No orthopnea, PND or edema. CTA on admission: no PE Never smoker.. Labs reviewed and noted below: normal CBC; normal BMP except moderate hyperglycemia (steroids). Plan: Pulmonology opinion; low dose sliding scale; continue current regimen; TSH with AM labs for review; ECHO    Pulmonology consulted.  IV Solu-Medrol changed to prednisone.  Patient remains stable on 3 L nasal cannula.  We will continue with prednisone taper on discharge.  Albuterol p.r.n. given on discharge.  Patient will follow up with pulmonology outpatient.  Patient discharged home on December 1st.  "

## 2023-11-27 NOTE — ED PROVIDER NOTES
Encounter Date: 11/26/2023       History     Chief Complaint   Patient presents with    COVID-19 Concerns     Recently hospitalized, was discharged home and started feeling worse. Extreme fatigue causing pt to fall this am around 3 am. Denies LOC, + hit head, -blood thinners. Abrasion noted to forehead. C/O worsening SOB. 89% on 2L in triage. Pt's states she has been so weak she couldn't even sit up on her own.      This is a 77-year-old female who presents secondary to generalized weakness, near-syncope and worsening shortness of breath.  The patient was recently admitted to the hospital due to COVID with hypoxia.  She was discharged home on oxygen 4-5 days ago.  She was doing well at 1st.  She reports she has had progressively worsening generalized weakness and fatigue with increasing shortness of breath even while being on the oxygen.  She got up to go to the bathroom last night and felt very weak, fell and struck her head.  She is not certain if she completely lost consciousness but does not think so.  She denies neck pain or any other injuries other than abrasion to her head at the time.  She has been using her home oxygen but is feeling more short of breath.  Her appetite has been poor, she is not been eating or drinking much and has extreme fatigue but no focal weakness.  Denies chest pain abdominal pain vomiting or diarrhea.  Denies any urinary problems or changes.  Denies any other problems or complaints.        Review of patient's allergies indicates:   Allergen Reactions    Pcn [penicillins] Hives    Promethazine hcl Other (See Comments)     Gets muscle spasams    Reglan [metoclopramide hcl] Other (See Comments)     Gets muscle spasams     Past Medical History:   Diagnosis Date    Depression     Diabetes mellitus     borderline    Kidney stone      Past Surgical History:   Procedure Laterality Date    CYSTOSCOPY N/A 9/21/2022    Procedure: CYSTOSCOPY;  Surgeon: Pepper Salas MD;  Location: [facility]  OR;  Service: Urology;  Laterality: N/A;    EXTRACORPOREAL SHOCK WAVE LITHOTRIPSY Left 9/21/2022    Procedure: LITHOTRIPSY, ESWL;  Surgeon: Pepper Salas MD;  Location: [facility] OR;  Service: Urology;  Laterality: Left;    RETROGRADE PYELOGRAPHY Left 9/21/2022    Procedure: PYELOGRAM, RETROGRADE;  Surgeon: Pepper Salas MD;  Location: [facility] OR;  Service: Urology;  Laterality: Left;    TONSILLECTOMY, ADENOIDECTOMY      TUBAL LIGATION      WISDOM TOOTH EXTRACTION       Family History   Problem Relation Age of Onset    Coronary artery disease Father     Urolithiasis Mother     Diabetes Maternal Grandmother     Thyroid disease Sister     Prostate cancer Neg Hx     Kidney cancer Neg Hx      Social History     Tobacco Use    Smoking status: Never    Smokeless tobacco: Never   Substance Use Topics    Alcohol use: No    Drug use: No     Review of Systems   Constitutional:  Positive for activity change, appetite change, chills, fatigue and fever.   HENT:  Positive for congestion. Negative for ear pain, rhinorrhea, sore throat and trouble swallowing.    Eyes: Negative.  Negative for photophobia, pain, redness and visual disturbance.   Respiratory:  Positive for cough and shortness of breath. Negative for chest tightness and wheezing.    Cardiovascular: Negative.  Negative for chest pain, palpitations and leg swelling.   Gastrointestinal: Negative.  Negative for abdominal distention, abdominal pain, blood in stool, constipation, diarrhea, nausea and vomiting.   Endocrine: Negative.    Genitourinary: Negative.  Negative for decreased urine volume, difficulty urinating, dysuria, flank pain, frequency, pelvic pain and urgency.   Musculoskeletal: Negative.  Negative for arthralgias, back pain, gait problem, myalgias, neck pain and neck stiffness.   Skin: Negative.  Negative for rash.   Neurological:  Positive for syncope (syncope versus near-syncope.), weakness (nonfocal generalized weakness and fatigue) and  light-headedness. Negative for dizziness, facial asymmetry, speech difficulty, numbness and headaches.   Psychiatric/Behavioral: Negative.  Negative for confusion.    All other systems reviewed and are negative.      Physical Exam     Initial Vitals [11/26/23 1127]   BP Pulse Resp Temp SpO2   114/70 104 18 98.4 °F (36.9 °C) (!) 90 %      MAP       --         Physical Exam    Nursing note and vitals reviewed.  Constitutional: She is cooperative. She appears ill. No distress.   Appears frail, appears ill but not acutely distressed.   HENT:   Head: Normocephalic and atraumatic.   Right Ear: Tympanic membrane normal.   Left Ear: Tympanic membrane normal.   Nose: Mucosal edema and rhinorrhea present.   Mouth/Throat: Uvula is midline and oropharynx is clear and moist. Mucous membranes are dry. No oral lesions. No uvula swelling. No oropharyngeal exudate, posterior oropharyngeal edema or posterior oropharyngeal erythema.   Lips and oropharynx dry   Eyes: Conjunctivae, EOM and lids are normal. Pupils are equal, round, and reactive to light. No scleral icterus.   Neck: Trachea normal and phonation normal. Neck supple. No thyroid mass and no thyromegaly present. No stridor present. No JVD present.   Normal range of motion.   Full passive range of motion without pain.     Cardiovascular:  Normal rate, regular rhythm, normal heart sounds, intact distal pulses and normal pulses.     Exam reveals no gallop and no friction rub.       No murmur heard.  Pulmonary/Chest: Effort normal and breath sounds normal. No accessory muscle usage or stridor. No tachypnea. No respiratory distress. She has no wheezes. She has no rhonchi. She has no rales.   Does not appear tachypneic however pulse ox noted to be 89% on 3 L, increased to 5 L with improvement to 91%.   Abdominal: Abdomen is soft. Bowel sounds are normal. She exhibits no distension, no pulsatile midline mass and no mass. There is no abdominal tenderness. There is no rigidity and no  guarding.   Musculoskeletal:         General: No tenderness or edema. Normal range of motion.      Right hand: Normal. Normal range of motion. Normal strength. Normal sensation. Normal capillary refill. Normal pulse.      Left hand: Normal. Normal range of motion. Normal strength. Normal sensation. Normal capillary refill. Normal pulse.      Cervical back: Normal, full passive range of motion without pain, normal range of motion and neck supple. No edema, erythema, rigidity or bony tenderness. No spinous process tenderness or muscular tenderness. Normal range of motion.      Thoracic back: Normal. No bony tenderness. Normal range of motion.      Lumbar back: Normal. No bony tenderness. Normal range of motion.      Right foot: Normal. Normal range of motion and normal capillary refill. No tenderness or bony tenderness. Normal pulse.      Left foot: Normal. Normal range of motion and normal capillary refill. No tenderness or bony tenderness. Normal pulse.      Comments: Pulses are 2+ throughout, cap refill is less than 2 sec throughout, extremities are nontender throughout with full range of motion. There is no spinal tenderness to palpation.     Neurological: She is alert and oriented to person, place, and time. She has normal strength. She displays normal reflexes. No cranial nerve deficit or sensory deficit.   No focal deficits.   Skin: Skin is warm, dry and intact. Capillary refill takes less than 2 seconds. No ecchymosis, no petechiae and no rash noted. No erythema. No pallor.   Psychiatric: She has a normal mood and affect. Her speech is normal and behavior is normal. Judgment and thought content normal. Cognition and memory are normal.         ED Course   Procedures  Labs Reviewed   URINALYSIS, REFLEX TO URINE CULTURE - Abnormal; Notable for the following components:       Result Value    Protein, UA 1+ (*)     Ketones, UA Trace (*)     Urobilinogen, UA 2.0-3.0 (*)     All other components within normal limits     Narrative:     Specimen Source->Urine   CBC W/ AUTO DIFFERENTIAL - Abnormal; Notable for the following components:    Immature Granulocytes 0.6 (*)     Immature Grans (Abs) 0.06 (*)     Mono # 1.2 (*)     Lymph % 14.6 (*)     All other components within normal limits    Narrative:     Recoll. [id number] by [facility] at 11/26/2023 12:58, reason: plts are   clumped on smear   COMPREHENSIVE METABOLIC PANEL - Abnormal; Notable for the following components:    Sodium 131 (*)     Glucose 119 (*)     Calcium 7.9 (*)     Total Protein 5.9 (*)     Albumin 3.3 (*)     All other components within normal limits   B-TYPE NATRIURETIC PEPTIDE - Abnormal; Notable for the following components:     (*)     All other components within normal limits    Narrative:     Recoll. [id number] by [facility] at 11/26/2023 12:57, reason: Specimen   clotted   URINALYSIS MICROSCOPIC - Abnormal; Notable for the following components:    Hyaline Casts, UA 3 (*)     All other components within normal limits    Narrative:     Specimen Source->Urine   D DIMER, QUANTITATIVE - Abnormal; Notable for the following components:    D-Dimer 0.99 (*)     All other components within normal limits    Narrative:     DDIMER   critical result(s) called and verbal readback obtained from   SU WORRELL RN ER. by TC1 11/26/2023 19:07   ISTAT PROCEDURE - Abnormal; Notable for the following components:    POC HCO3 28.4 (*)     POC BE 4 (*)     POC TCO2 30 (*)     All other components within normal limits   CULTURE, RESPIRATORY   LIPASE   DRUG SCREEN PANEL, URINE EMERGENCY    Narrative:     Specimen Source->Urine   CK   MAGNESIUM   TSH   MAGNESIUM   TROPONIN I HIGH SENSITIVITY   TROPONIN I HIGH SENSITIVITY   LACTIC ACID, PLASMA   PROCALCITONIN   PROCALCITONIN   SODIUM, URINE, RANDOM    Narrative:     Specimen Source->Urine   URIC ACID   URIC ACID   TROPONIN I HIGH SENSITIVITY   SODIUM, URINE, RANDOM   SODIUM, URINE, RANDOM   TROPONIN I HIGH SENSITIVITY        ECG  Results              EKG 12-lead (In process)  Result time 11/26/23 11:57:24      In process by Interface, Lab In [facility] (11/26/23 11:57:24)                   Narrative:    Test Reason : R55,    Vent. Rate : 104 BPM     Atrial Rate : 104 BPM     P-R Int : 122 ms          QRS Dur : 078 ms      QT Int : 326 ms       P-R-T Axes : 005 063 057 degrees     QTc Int : 428 ms    Sinus tachycardia  Otherwise normal ECG  When compared with ECG of 20-NOV-2023 08:17,  No significant change was found    Referred By: [email or web address]   SELF           Confirmed By:                                   Imaging Results              CTA Chest Non-Coronary (PE Studies) (Final result)  Result time 11/26/23 13:53:50      Final result by Adriano Cueto MD (11/26/23 13:53:50)                   Narrative:    CMS MANDATED QUALITY DATA - CT RADIATION - 436    All CT scans at this facility utilize dose modulation, iterative reconstruction, and/or weight based dosing when appropriate to reduce radiation dose to as low as reasonably achievable.        REASON: Pulmonary embolism (PE) suspected, high prob    TECHNIQUE: CT angiography of thorax with 100 mL Omnipaque 350.   Maximum intensity projection coronal reformations were created at a separate workstation and stored in the patient's permanent medical record.    COMPARISON: CTA chest November 21, 2023.    FINDINGS:    No pulmonary embolism. Heart size is normal. Thoracic aorta is normal caliber. No mediastinal lymphadenopathy or mass.    Small bilateral pleural effusions with overlying passive atelectasis. There is mild bilateral interlobular septal thickening with patchy groundglass lung opacities.    The visualized abdominal viscera are unremarkable. There is no acute osseous abnormality.    IMPRESSION:    1.  No pulmonary embolism.  2.  Bilateral small pleural effusions with overlying passive atelectasis.  3.  Mild bilateral interlobular septal thickening suggests mild pulmonary  edema.    Electronically signed by:  Adriano Cueto DO  11/26/2023 01:53 PM CST Workstation: [id number]                                     CT Cervical Spine Without Contrast (Final result)  Result time 11/26/23 13:49:08      Final result by Adriano Cueto MD (11/26/23 13:49:08)                   Narrative:    CMS MANDATED QUALITY DATA - CT RADIATION - 436    All CT scans at this facility utilize dose modulation, iterative reconstruction, and/or weight based dosing when appropriate to reduce radiation dose to as low as reasonably achievable.        Reason: Neck trauma (Age >= 65y) .    TECHNIQUE: Cervical spine CT without IV contrast obtained with coronal and sagittal reformations.    COMPARISON: None    FINDINGS:    Negative for fracture. There is multilevel discogenic and facet degenerative changes of the cervical spine.    Visualized cervical soft tissues are unremarkable.    Coronal and sagittal reformations show normal alignment without abnormal facet widening.    IMPRESSION:    Cervical spine degenerative changes, but no acute abnormality.    Electronically signed by:  Adriano Cueto DO  11/26/2023 01:49 PM CST Workstation: [id number]                                     CT Head Without Contrast (Final result)  Result time 11/26/23 13:45:59      Final result by Adriano Cueto MD (11/26/23 13:45:59)                   Narrative:    CMS MANDATED QUALITY DATA - CT RADIATION - 436    All CT scans at this facility utilize dose modulation, iterative reconstruction, and/or weight based dosing when appropriate to reduce radiation dose to as low as reasonably achievable.        Reason: Head trauma, minor (Age >= 65y)    TECHNIQUE: Head CT without IV contrast.    COMPARISON: None    FINDINGS:    Gray-white differentiation is maintained without hemorrhage, midline shift, or mass effect. Periventricular and subcortical white matter low-attenuation bilaterally suggest chronic small vessel ischemic changes. Diffuse  cerebral and cerebellar atrophy is evident.    The ventricles and cisterns are maintained.    Calvarium is intact. Opacified left maxillary sinus.    IMPRESSION:    1. No acute intracranial abnormality.  2. Chronic and involutional changes of the brain.        Electronically signed by:  Adriano Cueto DO  11/26/2023 01:45 PM CST Workstation: [id number]                                     X-Ray Chest AP Portable (Final result)  Result time 11/26/23 11:55:38      Final result by Adriano Cueto MD (11/26/23 11:55:38)                   Narrative:    Reason: syncope    FINDINGS:    Portable chest [with comparison chest x-ray November 20, 2023 show normal cardiomediastinal silhouette.  Mild bilateral interstitial lung opacities are noted. Pulmonary vasculature is normal. No acute osseous abnormality.    IMPRESSION:    Mild bilateral interstitial lung opacities could reflect mild pulmonary edema or lung infection.    Electronically signed by:  Adriano Cueto DO  11/26/2023 11:55 AM CST Workstation: [id number]                                     Medications   vancomycin - pharmacy to dose (has no administration in time range)   cefepime 1g in dextrose 5% 100 mL IVPB (ready to mix) (has no administration in time range)   sodium chloride 0.9% flush 10 mL (has no administration in time range)   naloxone 0.4 mg/mL injection 0.02 mg (has no administration in time range)   glucose chewable tablet 16 g (has no administration in time range)   glucose chewable tablet 24 g (has no administration in time range)   dextrose 50% injection 12.5 g (has no administration in time range)   dextrose 50% injection 25 g (has no administration in time range)   glucagon (human recombinant) injection 1 mg (has no administration in time range)   enoxaparin injection 40 mg (40 mg Subcutaneous Given 11/26/23 9073)   potassium bicarbonate disintegrating tablet 50 mEq (has no administration in time range)   potassium bicarbonate disintegrating  tablet 35 mEq (35 mEq Oral Given 11/27/23 1339)   potassium bicarbonate disintegrating tablet 60 mEq (has no administration in time range)   ondansetron injection 4 mg (has no administration in time range)   acetaminophen tablet 650 mg (has no administration in time range)   melatonin tablet 6 mg (has no administration in time range)   insulin aspart U-100 pen 0-5 Units (has no administration in time range)   acetaminophen tablet 650 mg (has no administration in time range)   senna-docusate 8.6-50 mg per tablet 1 tablet (1 tablet Oral Given 11/27/23 0947)   mupirocin 2 % ointment ( Nasal Not Given 11/27/23 0900)   0.9%  NaCl infusion ( Intravenous New Bag 11/27/23 0614)   cefTRIAXone (ROCEPHIN) 1 g in dextrose 5 % 100 mL IVPB (ready to mix) (0 g Intravenous Stopped 11/27/23 0059)   remdesivir 200 mg in sodium chloride 0.9% 250 mL infusion (0 mg Intravenous Stopped 11/26/23 2202)     Followed by   remdesivir 100 mg in sodium chloride 0.9% 250 mL infusion (100 mg Intravenous New Bag 11/27/23 1340)   methylPREDNISolone sodium succinate injection 40 mg (40 mg Intravenous Given 11/27/23 1339)   doxepin capsule 25 mg (25 mg Oral Given 11/26/23 2130)   EScitalopram oxalate tablet 20 mg (20 mg Oral Given 11/27/23 0614)   lamoTRIgine tablet 150 mg (150 mg Oral Given 11/27/23 0947)   loperamide capsule 2 mg (has no administration in time range)   ondansetron disintegrating tablet 4 mg (has no administration in time range)   traZODone tablet 150 mg (150 mg Oral Not Given 11/26/23 2100)   clonazePAM tablet 1 mg (1 mg Oral Given 11/26/23 2131)   vancomycin in dextrose 5 % 1 gram/250 mL IVPB 1,000 mg (1,000 mg Intravenous Trough Due As Scheduled Before Dose 11/29/23 1600)   acetaminophen tablet 1,000 mg (1,000 mg Oral Given 11/26/23 1223)   0.9%  NaCl infusion (0 mLs Intravenous Stopped 11/26/23 1954)   iohexoL (OMNIPAQUE 350) injection 100 mL (100 mLs Intravenous Given 11/26/23 1340)   methylPREDNISolone sodium succinate  injection 125 mg (125 mg Intravenous Given 11/26/23 1801)   vancomycin 1.25 g in dextrose 5% 250 mL IVPB (ready to mix) (0 mg Intravenous Stopped 11/26/23 1955)   magnesium sulfate in dextrose IVPB (premix) 1 g (0 g Intravenous Stopped 11/26/23 1955)   potassium chloride CR capsule 10 mEq (10 mEq Oral Given 11/26/23 1735)     Medical Decision Making  Amount and/or Complexity of Data Reviewed  Labs: ordered.  Radiology: ordered.    Risk  OTC drugs.  Prescription drug management.  Decision regarding hospitalization.                          Medical Decision Making:   Clinical Tests:   Lab Tests: Ordered and Reviewed  Radiological Study: Ordered and Reviewed  Medical Tests: Ordered and Reviewed  ED Management:  Blood cultures obtained, broad-spectrum IV antibiotics given for any possibility of secondary bacterial infection.  Patient appears dehydrated, gentle IV fluids given, Solu-Medrol given, will discuss with hospitalist for admission.             Clinical Impression:  Final diagnoses:  [R55] Syncope          ED Disposition Condition    Admit Stable                Soheila Hudson MD  11/27/23 8151

## 2023-11-27 NOTE — PLAN OF CARE
11/26/23 0208   Patient Assessment/Suction   Level of Consciousness (AVPU) alert   Respiratory Effort Normal;Unlabored   Rhythm/Pattern, Respiratory pattern regular;unlabored   PRE-TX-O2   Device (Oxygen Therapy) nasal cannula   $ Is the patient on Low Flow Oxygen? Yes   Flow (L/min) 3   SpO2 (!) 93 %   Pulse Oximetry Type Intermittent   $ Pulse Oximetry - Multiple Charge Pulse Oximetry - Multiple   Pulse 75   Resp 18   Education   $ Education 15 min   Respiratory Evaluation   $ Care Plan Tech Time 15 min

## 2023-11-27 NOTE — NURSING
Nurses Note -- 4 Eyes      11/26/2023   8:30 PM      Skin assessed during: Admit      [x] No Altered Skin Integrity Present    []Prevention Measures Documented      [] Yes- Altered Skin Integrity Present or Discovered   [] LDA Added if Not in Epic (Describe Wound)   [] New Altered Skin Integrity was Present on Admit and Documented in LDA   [] Wound Image Taken    Wound Care Consulted? No    Attending Nurse:  Sid Kelly RN/Staff Member:  [id number]

## 2023-11-27 NOTE — PT/OT/SLP EVAL
Physical Therapy Evaluation    Patient Name:  Amanda Pereira   MRN:  [id number]    Recommendations:     Discharge Recommendations: Low Intensity Therapy   Discharge Equipment Recommendations: none   Barriers to discharge:  desat with limited mobility, increase O2 needs,     Assessment:     Amanda Pereira is a 77 y.o. female admitted with a medical diagnosis of Hypoxia.  She presents with the following impairments/functional limitations: weakness, impaired endurance, impaired self care skills, impaired functional mobility, gait instability, impaired balance, decreased lower extremity function, decreased safety awareness, pain, impaired cardiopulmonary response to activity.    Pt found up in chair on 4L. Sao2 91% at rest. Pt ambulated 90 ft with RW and CGA desat to 88% quick recovery to 92% with brief seated rest break.     Rehab Prognosis: Fair; patient would benefit from acute skilled PT services to address these deficits and reach maximum level of function.    Recent Surgery: * No surgery found *      Plan:     During this hospitalization, patient to be seen 5 x/week to address the identified rehab impairments via gait training, therapeutic activities, therapeutic exercises, neuromuscular re-education and progress toward the following goals:    Plan of Care Expires:  12/27/23    Subjective     Chief Complaint: none, feels better  Patient/Family Comments/goals: go home  Pain/Comfort:  Pain Rating 1: 0/10    Patients cultural, spiritual, Scientologist conflicts given the current situation: no    Living Environment:  Pt lives alone in a 2SH with 4 steps to enter.   Prior to admission, patients level of function was Independent prior to previous hospitalization, recently MI with RW.  Equipment used at home: walker, rolling, bedside commode.  DME owned (not currently used): none.  Upon discharge, patient will have assistance from daughter.    Objective:     Communicated with RN prior to session.  Patient found up in chair with  peripheral IV, telemetry, oxygen  upon PT entry to room.    General Precautions: Standard, fall, airborne, droplet, contact  Orthopedic Precautions:N/A   Braces: N/A  Respiratory Status: Nasal cannula, flow 4 L/min    Exams:  Cognitive Exam:  Patient is oriented to Person, Place, Time, and Situation  RLE ROM: WFL  RLE Strength: WFL  LLE ROM: WFL  LLE Strength: WFL    Functional Mobility:  Bed Mobility:     Sit to Supine: supervision  Transfers:     Sit to Stand:  contact guard assistance with no AD  Bed to Chair: contact guard assistance with  no AD  using  Stand Pivot  Gait: 90 ft with RW and CGA desat to 88%      AM-PAC 6 CLICK MOBILITY  Total Score:17       Treatment & Education:  Pt educated on POC, discharge recommendation, importance of time OOB, pursed lip breathing, pacing/energy conservation, need for assist with mobility, use of call bell to seek assistance as needed and fall prevention      Patient left HOB elevated with all lines intact, call button in reach, and bed alarm on.    GOALS:   Multidisciplinary Problems       Physical Therapy Goals          Problem: Physical Therapy    Goal Priority Disciplines Outcome Goal Variances Interventions   Physical Therapy Goal     PT, PT/OT Ongoing, Progressing     Description: Goals to be met by: 23     Patient will increase functional independence with mobility by performin. Supine to sit with Supervision  2. Sit to stand transfer with Supervision  3. Bed to chair transfer with Supervision using Rolling Walker  4. Gait  x 150 feet with Supervision using Rolling Walker.                              History:     Past Medical History:   Diagnosis Date    Depression     Diabetes mellitus     borderline    Kidney stone        Past Surgical History:   Procedure Laterality Date    CYSTOSCOPY N/A 2022    Procedure: CYSTOSCOPY;  Surgeon: Pepper Salas MD;  Location: [facility];  Service: Urology;  Laterality: N/A;    EXTRACORPOREAL SHOCK WAVE  LITHOTRIPSY Left 9/21/2022    Procedure: LITHOTRIPSY, ESWL;  Surgeon: Pepper Salas MD;  Location: [facility] OR;  Service: Urology;  Laterality: Left;    RETROGRADE PYELOGRAPHY Left 9/21/2022    Procedure: PYELOGRAM, RETROGRADE;  Surgeon: Pepper Salas MD;  Location: [facility] OR;  Service: Urology;  Laterality: Left;    TONSILLECTOMY, ADENOIDECTOMY      TUBAL LIGATION      WISDOM TOOTH EXTRACTION         Time Tracking:     PT Received On: 11/27/23  PT Start Time: 1316     PT Stop Time: 1335  PT Total Time (min): 19 min     Billable Minutes: Evaluation 8 and Gait Training 11      11/27/2023

## 2023-11-28 LAB
ANION GAP SERPL CALC-SCNC: 4 MMOL/L (ref 8–16)
BASOPHILS # BLD AUTO: 0.01 K/UL (ref 0–0.2)
BASOPHILS NFR BLD: 0.1 % (ref 0–1.9)
BUN SERPL-MCNC: 17 MG/DL (ref 8–23)
CALCIUM SERPL-MCNC: 7.5 MG/DL (ref 8.7–10.5)
CHLORIDE SERPL-SCNC: 105 MMOL/L (ref 95–110)
CO2 SERPL-SCNC: 27 MMOL/L (ref 23–29)
CREAT SERPL-MCNC: 0.5 MG/DL (ref 0.5–1.4)
CRP SERPL-MCNC: 39 MG/L (ref 0–8.2)
DIFFERENTIAL METHOD: ABNORMAL
EOSINOPHIL # BLD AUTO: 0 K/UL (ref 0–0.5)
EOSINOPHIL NFR BLD: 0 % (ref 0–8)
ERYTHROCYTE [DISTWIDTH] IN BLOOD BY AUTOMATED COUNT: 14.6 % (ref 11.5–14.5)
EST. GFR  (NO RACE VARIABLE): >60 ML/MIN/1.73 M^2
GLUCOSE SERPL-MCNC: 152 MG/DL (ref 70–110)
HCT VFR BLD AUTO: 38.2 % (ref 37–48.5)
HGB BLD-MCNC: 12.6 G/DL (ref 12–16)
IMM GRANULOCYTES # BLD AUTO: 0.05 K/UL (ref 0–0.04)
IMM GRANULOCYTES NFR BLD AUTO: 0.5 % (ref 0–0.5)
LYMPHOCYTES # BLD AUTO: 1.4 K/UL (ref 1–4.8)
LYMPHOCYTES NFR BLD: 13.6 % (ref 18–48)
MAGNESIUM SERPL-MCNC: 2 MG/DL (ref 1.6–2.6)
MCH RBC QN AUTO: 29.2 PG (ref 27–31)
MCHC RBC AUTO-ENTMCNC: 33 G/DL (ref 32–36)
MCV RBC AUTO: 88 FL (ref 82–98)
MONOCYTES # BLD AUTO: 0.9 K/UL (ref 0.3–1)
MONOCYTES NFR BLD: 8.6 % (ref 4–15)
NEUTROPHILS # BLD AUTO: 7.7 K/UL (ref 1.8–7.7)
NEUTROPHILS NFR BLD: 77.2 % (ref 38–73)
NRBC BLD-RTO: 0 /100 WBC
PLATELET # BLD AUTO: 367 K/UL (ref 150–450)
PMV BLD AUTO: 10.1 FL (ref 9.2–12.9)
POTASSIUM SERPL-SCNC: 3.8 MMOL/L (ref 3.5–5.1)
RBC # BLD AUTO: 4.32 M/UL (ref 4–5.4)
SODIUM SERPL-SCNC: 136 MMOL/L (ref 136–145)
WBC # BLD AUTO: 9.94 K/UL (ref 3.9–12.7)

## 2023-11-28 PROCEDURE — 83735 ASSAY OF MAGNESIUM: CPT | Performed by: INTERNAL MEDICINE

## 2023-11-28 PROCEDURE — 97116 GAIT TRAINING THERAPY: CPT

## 2023-11-28 PROCEDURE — 94640 AIRWAY INHALATION TREATMENT: CPT

## 2023-11-28 PROCEDURE — 85025 COMPLETE CBC W/AUTO DIFF WBC: CPT | Performed by: INTERNAL MEDICINE

## 2023-11-28 PROCEDURE — 36415 COLL VENOUS BLD VENIPUNCTURE: CPT | Performed by: INTERNAL MEDICINE

## 2023-11-28 PROCEDURE — 63600175 PHARM REV CODE 636 W HCPCS: Performed by: INTERNAL MEDICINE

## 2023-11-28 PROCEDURE — 25000003 PHARM REV CODE 250: Performed by: INTERNAL MEDICINE

## 2023-11-28 PROCEDURE — 94761 N-INVAS EAR/PLS OXIMETRY MLT: CPT

## 2023-11-28 PROCEDURE — 27000221 HC OXYGEN, UP TO 24 HOURS

## 2023-11-28 PROCEDURE — 86140 C-REACTIVE PROTEIN: CPT | Performed by: INTERNAL MEDICINE

## 2023-11-28 PROCEDURE — 80048 BASIC METABOLIC PNL TOTAL CA: CPT | Performed by: INTERNAL MEDICINE

## 2023-11-28 PROCEDURE — 21400001 HC TELEMETRY ROOM

## 2023-11-28 PROCEDURE — 99900035 HC TECH TIME PER 15 MIN (STAT)

## 2023-11-28 PROCEDURE — 25000242 PHARM REV CODE 250 ALT 637 W/ HCPCS: Performed by: INTERNAL MEDICINE

## 2023-11-28 RX ADMIN — LOPERAMIDE HYDROCHLORIDE 2 MG: 2 CAPSULE ORAL at 11:11

## 2023-11-28 RX ADMIN — SENNOSIDES AND DOCUSATE SODIUM 1 TABLET: 50; 8.6 TABLET ORAL at 09:11

## 2023-11-28 RX ADMIN — SODIUM CHLORIDE: 0.9 INJECTION, SOLUTION INTRAVENOUS at 11:11

## 2023-11-28 RX ADMIN — CEFTRIAXONE SODIUM 1 G: 1 INJECTION, POWDER, FOR SOLUTION INTRAMUSCULAR; INTRAVENOUS at 11:11

## 2023-11-28 RX ADMIN — LAMOTRIGINE 150 MG: 100 TABLET ORAL at 08:11

## 2023-11-28 RX ADMIN — METHYLPREDNISOLONE SODIUM SUCCINATE 40 MG: 40 INJECTION, POWDER, FOR SOLUTION INTRAMUSCULAR; INTRAVENOUS at 03:11

## 2023-11-28 RX ADMIN — METHYLPREDNISOLONE SODIUM SUCCINATE 40 MG: 40 INJECTION, POWDER, FOR SOLUTION INTRAMUSCULAR; INTRAVENOUS at 10:11

## 2023-11-28 RX ADMIN — BUDESONIDE INHALATION 0.5 MG: 0.5 SUSPENSION RESPIRATORY (INHALATION) at 08:11

## 2023-11-28 RX ADMIN — CLONAZEPAM 1 MG: 1 TABLET ORAL at 03:11

## 2023-11-28 RX ADMIN — DOXEPIN HYDROCHLORIDE 25 MG: 25 CAPSULE ORAL at 09:11

## 2023-11-28 RX ADMIN — MUPIROCIN 1 G: 20 OINTMENT TOPICAL at 08:11

## 2023-11-28 RX ADMIN — CLONAZEPAM 1 MG: 1 TABLET ORAL at 11:11

## 2023-11-28 RX ADMIN — SENNOSIDES AND DOCUSATE SODIUM 1 TABLET: 50; 8.6 TABLET ORAL at 08:11

## 2023-11-28 RX ADMIN — MUPIROCIN 1 G: 20 OINTMENT TOPICAL at 09:11

## 2023-11-28 RX ADMIN — LAMOTRIGINE 150 MG: 100 TABLET ORAL at 09:11

## 2023-11-28 RX ADMIN — ESCITALOPRAM OXALATE 20 MG: 10 TABLET ORAL at 08:11

## 2023-11-28 NOTE — PT/OT/SLP PROGRESS
Physical Therapy Treatment    Patient Name:  Amanda Pereira   MRN:  [id number]    Recommendations:     Discharge Recommendations: Low Intensity Therapy  Discharge Equipment Recommendations: none  Barriers to discharge:  desat with limited mobility, increase O2 needs,    Assessment:     Amanda Pereira is a 77 y.o. female admitted with a medical diagnosis of Hypoxia.  She presents with the following impairments/functional limitations: weakness, impaired endurance, impaired self care skills, impaired functional mobility, gait instability, impaired balance, decreased lower extremity function, decreased safety awareness, pain, impaired cardiopulmonary response to activity.    Pt found in bed with HOB elevated. Pt SAO2 at rest in bed 91%. Pt continues to desat on 4L with bed mobility and transfers to 88%. Increase to 5L for ambulation. Pt ambulated 30 ft with HHA and CGA with no shortness of breath and SAO2 90%. After brief standing rest break patient ambulated 30 ft again with HHA and CGA with reports of moderate shortness of breath 89%. Pt request to return to bed and able to tolerate return to 4L at end of session at rest.     Rehab Prognosis: Fair; patient would benefit from acute skilled PT services to address these deficits and reach maximum level of function.    Recent Surgery: * No surgery found *      Plan:     During this hospitalization, patient to be seen 5 x/week to address the identified rehab impairments via gait training, therapeutic activities, therapeutic exercises, neuromuscular re-education and progress toward the following goals:    Plan of Care Expires:  12/27/23    Subjective     Chief Complaint: fatigue  Patient/Family Comments/goals: return to PLOF  Pain/Comfort:  Pain Rating 1: 0/10      Objective:     Communicated with RN prior to session.  Patient found HOB elevated with peripheral IV, telemetry, oxygen upon PT entry to room.     General Precautions: Standard, fall, airborne, droplet,  contact  Orthopedic Precautions: N/A  Braces: N/A  Respiratory Status: Nasal cannula, flow 4 L/min     Functional Mobility:  Bed Mobility:     Supine to Sit: supervision  Sit to Supine: supervision  Transfers:     Sit to Stand:  contact guard assistance with no AD  Gait: 30 ft x 2 with HHA and CGA desat with limited mobility      AM-PAC 6 CLICK MOBILITY          Treatment & Education:  Pt educated on POC, discharge recommendation, importance of time OOB, pursed lip breathing, pacing/energy conservation, need for assist with mobility, use of call bell to seek assistance as needed and fall prevention     Patient left HOB elevated with all lines intact, call button in reach, and bed alarm on..    GOALS:   Multidisciplinary Problems       Physical Therapy Goals          Problem: Physical Therapy    Goal Priority Disciplines Outcome Goal Variances Interventions   Physical Therapy Goal     PT, PT/OT Ongoing, Progressing     Description: Goals to be met by: 23     Patient will increase functional independence with mobility by performin. Supine to sit with Supervision  2. Sit to stand transfer with Supervision  3. Bed to chair transfer with Supervision using Rolling Walker  4. Gait  x 150 feet with Supervision using Rolling Walker.                              Time Tracking:     PT Received On: 23  PT Start Time: 1111     PT Stop Time: 1124  PT Total Time (min): 13 min     Billable Minutes: Gait Training 13    Treatment Type: Treatment  PT/PTA: PT     Number of PTA visits since last PT visit: 0     2023

## 2023-11-28 NOTE — RESPIRATORY THERAPY
11/27/23 2011   Patient Assessment/Suction   Level of Consciousness (AVPU) alert   Respiratory Effort Normal;Unlabored   Expansion/Accessory Muscles/Retractions no retractions;no use of accessory muscles   All Lung Fields Breath Sounds Anterior:;Posterior:;Lateral:;equal bilaterally;clear;diminished   Rhythm/Pattern, Respiratory depth regular;pattern regular;unlabored   Skin Integrity   $ Wound Care Tech Time 15 min   Area Observed Right;Behind ear;Left;Cheek;Nares;Upper lip   Skin Appearance without discoloration   PRE-TX-O2   Device (Oxygen Therapy) nasal cannula   $ Is the patient on Low Flow Oxygen? Yes   Flow (L/min) 4   SpO2 (!) 88 %  (patient was walking from bathroom on oxygen.  after sitting in bed, her sao2 increased to 92%)   Pulse Oximetry Type Intermittent   $ Pulse Oximetry - Multiple Charge Pulse Oximetry - Multiple   Pulse (!) 115   Resp (!) 22   Aerosol Therapy   $ Aerosol Therapy Charges Aerosol Treatment   Daily Review of Necessity (SVN) completed   Respiratory Treatment Status (SVN) given   Treatment Route (SVN) oxygen;mouthpiece   Patient Position (SVN) HOB elevated   Post Treatment Assessment (SVN) breath sounds improved   Signs of Intolerance (SVN) none   Breath Sounds Post-Respiratory Treatment   Throughout All Fields Post-Treatment All Fields   Throughout All Fields Post-Treatment aeration increased   Post-treatment Heart Rate (beats/min) 96   Post-treatment Resp Rate (breaths/min) 18   Education   $ Education Bronchodilator   Respiratory Evaluation   $ Care Plan Tech Time 15 min   $ Eval/Re-eval Charges Evaluation   Evaluation For New Orders   Admitting Diagnosis covid with hypoxia   Pulmonary Diagnosis hypoxemia   Current Surgeries none   Home Oxygen   Has Home Oxygen? Yes   Liter Flow 2   Duration continuous   Route nasal cannula   Mode continuous   Device home concentrator with portable unit

## 2023-11-28 NOTE — SUBJECTIVE & OBJECTIVE
Interval History: slowly improving COVID    Review of Systems   Constitutional: Negative.    HENT: Negative.     Eyes: Negative.    Respiratory:  Positive for cough.    Cardiovascular: Negative.    Gastrointestinal: Negative.    Endocrine: Negative.    Genitourinary: Negative.    Musculoskeletal: Negative.    Skin: Negative.    Allergic/Immunologic: Negative.    Neurological: Negative.    Hematological: Negative.    All other systems reviewed and are negative.    Objective:     Vital Signs (Most Recent):  Temp: 97.9 °F (36.6 °C) (11/28/23 1500)  Pulse: 80 (11/28/23 1500)  Resp: 18 (11/28/23 1500)  BP: 121/63 (11/28/23 1500)  SpO2: (!) 93 % (11/28/23 1500) Vital Signs (24h Range):  Temp:  [97.6 °F (36.4 °C)-98.3 °F (36.8 °C)] 97.9 °F (36.6 °C)  Pulse:  [] 80  Resp:  [18-22] 18  SpO2:  [88 %-93 %] 93 %  BP: (118-130)/(63-69) 121/63     Weight: 64.3 kg (141 lb 12.1 oz)  Body mass index is 25.93 kg/m².    Intake/Output Summary (Last 24 hours) at 11/28/2023 1649  Last data filed at 11/28/2023 0309  Gross per 24 hour   Intake 840 ml   Output --   Net 840 ml         Physical Exam  Vitals and nursing note reviewed.   Constitutional:       Appearance: She is well-developed.   HENT:      Head: Normocephalic and atraumatic.      Right Ear: External ear normal.      Left Ear: External ear normal.      Nose: Nose normal.   Eyes:      Conjunctiva/sclera: Conjunctivae normal.      Pupils: Pupils are equal, round, and reactive to light.   Cardiovascular:      Rate and Rhythm: Normal rate and regular rhythm.      Heart sounds: Normal heart sounds.   Pulmonary:      Effort: Pulmonary effort is normal.      Breath sounds: Normal breath sounds.      Comments: Decreased entry without adventitious sounds  Abdominal:      General: Bowel sounds are normal.      Palpations: Abdomen is soft.   Musculoskeletal:         General: Normal range of motion.      Cervical back: Normal range of motion and neck supple.   Skin:     General: Skin  is warm and dry.      Capillary Refill: Capillary refill takes less than 2 seconds.   Neurological:      Mental Status: She is alert and oriented to person, place, and time.   Psychiatric:         Behavior: Behavior normal.         Thought Content: Thought content normal.         Judgment: Judgment normal.             Significant Labs: All pertinent labs within the past 24 hours have been reviewed.  BMP:   Recent Labs   Lab 11/28/23  0454   *      K 3.8      CO2 27   BUN 17   CREATININE 0.5   CALCIUM 7.5*   MG 2.0     CBC:   Recent Labs   Lab 11/27/23  0507 11/28/23  0454   WBC 5.95 9.94   HGB 12.8 12.6   HCT 40.0 38.2    367       Significant Imaging: I have reviewed all pertinent imaging results/findings within the past 24 hours.

## 2023-11-28 NOTE — PROGRESS NOTES
"[facility] Medicine  Progress Note    Patient Name: Amanda Pereira  MRN: [id number]  Patient Class: IP- Inpatient   Admission Date: 11/26/2023  Length of Stay: 2 days  Attending Physician: Timothy Smith MD  Primary Care Provider: Kareem Sosa MD        Subjective:     Principal Problem:Hypoxia        HPI:  77-year-old female, recently admitted to [facility] for covid with hypoxia, treated with steroids, sent home with 2L oxygen and 3 days of prednisone.  Daughter states patient did very well after discharge initially.  Last prednisone use was Friday.  Saturday night patient became worse with body aches, malaise, chills.  At 3:00 a.m. she had been trying to get out of bed, legs became weak, was found on the ground with head bruise.  Daughter thinks she passed out, she was not very "with it" upon being found.  Hematoma discovered on forehead.  Up until that time she had not been eating very much however this is not unusual for her, daughter states "she eats like a bird."      In the ER patient was clinically dehydrated, dry mouth and lips.  Normotensive but tachycardic.  She was requiring 4 L of oxygen to maintain saturations greater than 90%, compared to the 2 L of oxygen Rx'd upon discharge.      In the ER CT PE study with bilateral small pleural effusions, atelectasis, septal thickening possibly due to pulmonary edema, no PE.  CT head and C-spine without acute changes.     Patient to be admitted for COVID pneumonia with syncope, failed outpatient therapy.       Overview/Hospital Course:  11/27  Assumed care. Chart reviewed. Labs reviewed and noted below: normal CBC; mild hypokalemia (sliding scale) with normal renal function; cardiac biomarkers are normal, as are TSH and lactate. . Telemetry reviewed: sinus. Feels "Much better" since admission. Requiring 4 lpm to achieve 94% sats. Plan: continue current regimen; AM labs for review.    11/28  Resting comfortably in bed. Daughter at bedside. " Speaking in sentences. Breathing easily, but requiring 4 lpm to maintain saturations (normally uses 2 lpm at home). No orthopnea. Has dry cough. No CP. Remdesivir was discontinued yesterday (was day 11 after diagnosis). Labs reviewed and noted below: normal CBC, normal electrolytes and renal function. Plan: will continue current regimen and re-evaluate in AM with hopes of discharge on po regimen. Discussed with daughter and patient, both of whom expressed understanding        Interval History: slowly improving COVID    Review of Systems   Constitutional: Negative.    HENT: Negative.     Eyes: Negative.    Respiratory:  Positive for cough.    Cardiovascular: Negative.    Gastrointestinal: Negative.    Endocrine: Negative.    Genitourinary: Negative.    Musculoskeletal: Negative.    Skin: Negative.    Allergic/Immunologic: Negative.    Neurological: Negative.    Hematological: Negative.    All other systems reviewed and are negative.    Objective:     Vital Signs (Most Recent):  Temp: 97.9 °F (36.6 °C) (11/28/23 1500)  Pulse: 80 (11/28/23 1500)  Resp: 18 (11/28/23 1500)  BP: 121/63 (11/28/23 1500)  SpO2: (!) 93 % (11/28/23 1500) Vital Signs (24h Range):  Temp:  [97.6 °F (36.4 °C)-98.3 °F (36.8 °C)] 97.9 °F (36.6 °C)  Pulse:  [] 80  Resp:  [18-22] 18  SpO2:  [88 %-93 %] 93 %  BP: (118-130)/(63-69) 121/63     Weight: 64.3 kg (141 lb 12.1 oz)  Body mass index is 25.93 kg/m².    Intake/Output Summary (Last 24 hours) at 11/28/2023 1649  Last data filed at 11/28/2023 0309  Gross per 24 hour   Intake 840 ml   Output --   Net 840 ml         Physical Exam  Vitals and nursing note reviewed.   Constitutional:       Appearance: She is well-developed.   HENT:      Head: Normocephalic and atraumatic.      Right Ear: External ear normal.      Left Ear: External ear normal.      Nose: Nose normal.   Eyes:      Conjunctiva/sclera: Conjunctivae normal.      Pupils: Pupils are equal, round, and reactive to light.   Cardiovascular:       Rate and Rhythm: Normal rate and regular rhythm.      Heart sounds: Normal heart sounds.   Pulmonary:      Effort: Pulmonary effort is normal.      Breath sounds: Normal breath sounds.      Comments: Decreased entry without adventitious sounds  Abdominal:      General: Bowel sounds are normal.      Palpations: Abdomen is soft.   Musculoskeletal:         General: Normal range of motion.      Cervical back: Normal range of motion and neck supple.   Skin:     General: Skin is warm and dry.      Capillary Refill: Capillary refill takes less than 2 seconds.   Neurological:      Mental Status: She is alert and oriented to person, place, and time.   Psychiatric:         Behavior: Behavior normal.         Thought Content: Thought content normal.         Judgment: Judgment normal.             Significant Labs: All pertinent labs within the past 24 hours have been reviewed.  BMP:   Recent Labs   Lab 11/28/23  0454   *      K 3.8      CO2 27   BUN 17   CREATININE 0.5   CALCIUM 7.5*   MG 2.0     CBC:   Recent Labs   Lab 11/27/23  0507 11/28/23  0454   WBC 5.95 9.94   HGB 12.8 12.6   HCT 40.0 38.2    367       Significant Imaging: I have reviewed all pertinent imaging results/findings within the past 24 hours.    Assessment/Plan:      No notes have been filed under this hospital service.  Service: Hospital Medicine    VTE Risk Mitigation (From admission, onward)           Ordered     enoxaparin injection 40 mg  Daily         11/26/23 1505     IP VTE HIGH RISK PATIENT  Once         11/26/23 1504     Place sequential compression device  Until discontinued         11/26/23 1504                    Discharge Planning   RITA: 11/30/2023     Code Status: Full Code   Is the patient medically ready for discharge?:     Reason for patient still in hospital (select all that apply): Patient trending condition and Treatment  Discharge Plan A: Home                  Timothy Smith MD  Department of Hospital  Medicine   [facility]

## 2023-11-29 ENCOUNTER — CLINICAL SUPPORT (OUTPATIENT)
Dept: CARDIOLOGY | Facility: HOSPITAL | Age: 78
DRG: 189 | End: 2023-11-29
Attending: INTERNAL MEDICINE
Payer: MEDICARE

## 2023-11-29 VITALS — HEIGHT: 62 IN | BODY MASS INDEX: 26.01 KG/M2 | WEIGHT: 141.31 LBS

## 2023-11-29 LAB
ANION GAP SERPL CALC-SCNC: 5 MMOL/L (ref 8–16)
AORTIC ROOT ANNULUS: 2.6 CM
AORTIC VALVE CUSP SEPERATION: 1.9 CM
ASCENDING AORTA: 3.2 CM
AV INDEX (PROSTH): 1.11
AV MEAN GRADIENT: 3 MMHG
AV PEAK GRADIENT: 5 MMHG
AV VALVE AREA BY VELOCITY RATIO: 3.54 CM²
AV VALVE AREA: 3.5 CM²
AV VELOCITY RATIO: 1.13
BASOPHILS # BLD AUTO: 0.01 K/UL (ref 0–0.2)
BASOPHILS NFR BLD: 0.1 % (ref 0–1.9)
BSA FOR ECHO PROCEDURE: 1.67 M2
BUN SERPL-MCNC: 19 MG/DL (ref 8–23)
CALCIUM SERPL-MCNC: 7.7 MG/DL (ref 8.7–10.5)
CHLORIDE SERPL-SCNC: 108 MMOL/L (ref 95–110)
CO2 SERPL-SCNC: 26 MMOL/L (ref 23–29)
CREAT SERPL-MCNC: 0.6 MG/DL (ref 0.5–1.4)
CRP SERPL-MCNC: 18.8 MG/L (ref 0–8.2)
CV ECHO LV RWT: 0.43 CM
DIFFERENTIAL METHOD: ABNORMAL
DOP CALC AO PEAK VEL: 1.17 M/S
DOP CALC AO VTI: 24.5 CM
DOP CALC LVOT AREA: 3.1 CM2
DOP CALC LVOT DIAMETER: 2 CM
DOP CALC LVOT PEAK VEL: 1.32 M/S
DOP CALC LVOT STROKE VOLUME: 85.72 CM3
DOP CALC MV VTI: 18.4 CM
DOP CALCLVOT PEAK VEL VTI: 27.3 CM
E WAVE DECELERATION TIME: 250 MSEC
E/A RATIO: 0.93
E/E' RATIO: 7.71 M/S
ECHO LV POSTERIOR WALL: 0.93 CM (ref 0.6–1.1)
EOSINOPHIL # BLD AUTO: 0 K/UL (ref 0–0.5)
EOSINOPHIL NFR BLD: 0 % (ref 0–8)
ERYTHROCYTE [DISTWIDTH] IN BLOOD BY AUTOMATED COUNT: 14.6 % (ref 11.5–14.5)
EST. GFR  (NO RACE VARIABLE): >60 ML/MIN/1.73 M^2
FRACTIONAL SHORTENING: 37 % (ref 28–44)
GLUCOSE SERPL-MCNC: 200 MG/DL (ref 70–110)
HCT VFR BLD AUTO: 40.4 % (ref 37–48.5)
HGB BLD-MCNC: 13 G/DL (ref 12–16)
IMM GRANULOCYTES # BLD AUTO: 0.04 K/UL (ref 0–0.04)
IMM GRANULOCYTES NFR BLD AUTO: 0.5 % (ref 0–0.5)
INTERVENTRICULAR SEPTUM: 0.94 CM (ref 0.6–1.1)
IVC DIAMETER: 2.15 CM
LEFT ATRIUM SIZE: 3.4 CM
LEFT INTERNAL DIMENSION IN SYSTOLE: 2.72 CM (ref 2.1–4)
LEFT VENTRICLE DIASTOLIC VOLUME INDEX: 51.76 ML/M2
LEFT VENTRICLE DIASTOLIC VOLUME: 85.4 ML
LEFT VENTRICLE MASS INDEX: 80 G/M2
LEFT VENTRICLE SYSTOLIC VOLUME INDEX: 16.7 ML/M2
LEFT VENTRICLE SYSTOLIC VOLUME: 27.5 ML
LEFT VENTRICULAR INTERNAL DIMENSION IN DIASTOLE: 4.35 CM (ref 3.5–6)
LEFT VENTRICULAR MASS: 132.33 G
LV LATERAL E/E' RATIO: 8.1 M/S
LV SEPTAL E/E' RATIO: 7.36 M/S
LVOT MG: 4 MMHG
LVOT MV: 0.85 CM/S
LYMPHOCYTES # BLD AUTO: 0.9 K/UL (ref 1–4.8)
LYMPHOCYTES NFR BLD: 10.7 % (ref 18–48)
MAGNESIUM SERPL-MCNC: 1.9 MG/DL (ref 1.6–2.6)
MCH RBC QN AUTO: 29.2 PG (ref 27–31)
MCHC RBC AUTO-ENTMCNC: 32.2 G/DL (ref 32–36)
MCV RBC AUTO: 91 FL (ref 82–98)
MONOCYTES # BLD AUTO: 0.5 K/UL (ref 0.3–1)
MONOCYTES NFR BLD: 6 % (ref 4–15)
MV MEAN GRADIENT: 1 MMHG
MV PEAK A VEL: 0.87 M/S
MV PEAK E VEL: 0.81 M/S
MV PEAK GRADIENT: 3 MMHG
MV STENOSIS PRESSURE HALF TIME: 61 MS
MV VALVE AREA BY CONTINUITY EQUATION: 4.66 CM2
MV VALVE AREA P 1/2 METHOD: 3.61 CM2
NEUTROPHILS # BLD AUTO: 7.1 K/UL (ref 1.8–7.7)
NEUTROPHILS NFR BLD: 82.7 % (ref 38–73)
NRBC BLD-RTO: 0 /100 WBC
PISA TR MAX VEL: 2.7 M/S
PLATELET # BLD AUTO: 424 K/UL (ref 150–450)
PMV BLD AUTO: 10.1 FL (ref 9.2–12.9)
POTASSIUM SERPL-SCNC: 3.9 MMOL/L (ref 3.5–5.1)
PV MV: 0.63 M/S
PV PEAK GRADIENT: 3 MMHG
PV PEAK VELOCITY: 0.9 M/S
RA PRESSURE ESTIMATED: 3 MMHG
RBC # BLD AUTO: 4.45 M/UL (ref 4–5.4)
RIGHT VENTRICULAR END-DIASTOLIC DIMENSION: 2.36 CM
RV TB RVSP: 6 MMHG
RV TISSUE DOPPLER FREE WALL SYSTOLIC VELOCITY 1 (APICAL 4 CHAMBER VIEW): 18.8 CM/S
SODIUM SERPL-SCNC: 139 MMOL/L (ref 136–145)
TDI LATERAL: 0.1 M/S
TDI SEPTAL: 0.11 M/S
TDI: 0.11 M/S
TR MAX PG: 29 MMHG
TV REST PULMONARY ARTERY PRESSURE: 32 MMHG
VANCOMYCIN TROUGH SERPL-MCNC: 3 UG/ML
WBC # BLD AUTO: 8.57 K/UL (ref 3.9–12.7)
Z-SCORE OF LEFT VENTRICULAR DIMENSION IN END DIASTOLE: -0.63
Z-SCORE OF LEFT VENTRICULAR DIMENSION IN END SYSTOLE: -0.43

## 2023-11-29 PROCEDURE — 63600175 PHARM REV CODE 636 W HCPCS: Performed by: INTERNAL MEDICINE

## 2023-11-29 PROCEDURE — 83735 ASSAY OF MAGNESIUM: CPT | Performed by: INTERNAL MEDICINE

## 2023-11-29 PROCEDURE — 25000003 PHARM REV CODE 250: Performed by: INTERNAL MEDICINE

## 2023-11-29 PROCEDURE — 80048 BASIC METABOLIC PNL TOTAL CA: CPT | Performed by: INTERNAL MEDICINE

## 2023-11-29 PROCEDURE — 99900031 HC PATIENT EDUCATION (STAT)

## 2023-11-29 PROCEDURE — 93306 TTE W/DOPPLER COMPLETE: CPT

## 2023-11-29 PROCEDURE — 86140 C-REACTIVE PROTEIN: CPT | Performed by: INTERNAL MEDICINE

## 2023-11-29 PROCEDURE — 25000242 PHARM REV CODE 250 ALT 637 W/ HCPCS: Performed by: INTERNAL MEDICINE

## 2023-11-29 PROCEDURE — 94760 N-INVAS EAR/PLS OXIMETRY 1: CPT

## 2023-11-29 PROCEDURE — 99900035 HC TECH TIME PER 15 MIN (STAT)

## 2023-11-29 PROCEDURE — 94761 N-INVAS EAR/PLS OXIMETRY MLT: CPT

## 2023-11-29 PROCEDURE — 80202 ASSAY OF VANCOMYCIN: CPT | Performed by: INTERNAL MEDICINE

## 2023-11-29 PROCEDURE — 93306 TTE W/DOPPLER COMPLETE: CPT | Mod: 26,,, | Performed by: INTERNAL MEDICINE

## 2023-11-29 PROCEDURE — 97116 GAIT TRAINING THERAPY: CPT

## 2023-11-29 PROCEDURE — 36415 COLL VENOUS BLD VENIPUNCTURE: CPT | Performed by: INTERNAL MEDICINE

## 2023-11-29 PROCEDURE — 94640 AIRWAY INHALATION TREATMENT: CPT

## 2023-11-29 PROCEDURE — 21400001 HC TELEMETRY ROOM

## 2023-11-29 PROCEDURE — 85025 COMPLETE CBC W/AUTO DIFF WBC: CPT | Performed by: INTERNAL MEDICINE

## 2023-11-29 PROCEDURE — 97535 SELF CARE MNGMENT TRAINING: CPT

## 2023-11-29 PROCEDURE — 93306 ECHO (CUPID ONLY): ICD-10-PCS | Mod: 26,,, | Performed by: INTERNAL MEDICINE

## 2023-11-29 PROCEDURE — 27000221 HC OXYGEN, UP TO 24 HOURS

## 2023-11-29 RX ORDER — VANCOMYCIN HCL IN 5 % DEXTROSE 1G/250ML
1000 PLASTIC BAG, INJECTION (ML) INTRAVENOUS
Status: DISCONTINUED | OUTPATIENT
Start: 2023-11-30 | End: 2023-11-30

## 2023-11-29 RX ADMIN — METHYLPREDNISOLONE SODIUM SUCCINATE 40 MG: 40 INJECTION, POWDER, FOR SOLUTION INTRAMUSCULAR; INTRAVENOUS at 01:11

## 2023-11-29 RX ADMIN — LAMOTRIGINE 150 MG: 100 TABLET ORAL at 08:11

## 2023-11-29 RX ADMIN — METHYLPREDNISOLONE SODIUM SUCCINATE 40 MG: 40 INJECTION, POWDER, FOR SOLUTION INTRAMUSCULAR; INTRAVENOUS at 06:11

## 2023-11-29 RX ADMIN — CLONAZEPAM 1 MG: 1 TABLET ORAL at 10:11

## 2023-11-29 RX ADMIN — MUPIROCIN 1 G: 20 OINTMENT TOPICAL at 08:11

## 2023-11-29 RX ADMIN — ENOXAPARIN SODIUM 40 MG: 40 INJECTION SUBCUTANEOUS at 03:11

## 2023-11-29 RX ADMIN — DOXEPIN HYDROCHLORIDE 25 MG: 25 CAPSULE ORAL at 08:11

## 2023-11-29 RX ADMIN — METHYLPREDNISOLONE SODIUM SUCCINATE 40 MG: 40 INJECTION, POWDER, FOR SOLUTION INTRAMUSCULAR; INTRAVENOUS at 10:11

## 2023-11-29 RX ADMIN — SODIUM CHLORIDE: 0.9 INJECTION, SOLUTION INTRAVENOUS at 08:11

## 2023-11-29 RX ADMIN — VANCOMYCIN HYDROCHLORIDE 1000 MG: 1 INJECTION, POWDER, LYOPHILIZED, FOR SOLUTION INTRAVENOUS at 03:11

## 2023-11-29 RX ADMIN — TRAZODONE HYDROCHLORIDE 150 MG: 50 TABLET ORAL at 08:11

## 2023-11-29 RX ADMIN — BUDESONIDE INHALATION 0.5 MG: 0.5 SUSPENSION RESPIRATORY (INHALATION) at 07:11

## 2023-11-29 RX ADMIN — CEFTRIAXONE SODIUM 1 G: 1 INJECTION, POWDER, FOR SOLUTION INTRAMUSCULAR; INTRAVENOUS at 11:11

## 2023-11-29 RX ADMIN — ESCITALOPRAM OXALATE 20 MG: 10 TABLET ORAL at 08:11

## 2023-11-29 RX ADMIN — BUDESONIDE INHALATION 0.5 MG: 0.5 SUSPENSION RESPIRATORY (INHALATION) at 09:11

## 2023-11-29 RX ADMIN — LOPERAMIDE HYDROCHLORIDE 2 MG: 2 CAPSULE ORAL at 08:11

## 2023-11-29 NOTE — CARE UPDATE
11/28/23 2019   Patient Assessment/Suction   Level of Consciousness (AVPU) alert   Respiratory Effort Normal;Unlabored   All Lung Fields Breath Sounds diminished   Cough Type no productive sputum   Skin Integrity   $ Wound Care Tech Time 15 min   Area Observed Left;Right;Behind ear;Nares   Skin Appearance without discoloration   PRE-TX-O2   Device (Oxygen Therapy) nasal cannula   $ Is the patient on Low Flow Oxygen? Yes   Flow (L/min) 4   SpO2 95 %   Pulse Oximetry Type Intermittent   $ Pulse Oximetry - Multiple Charge Pulse Oximetry - Multiple   Pulse 92   Resp 17   Aerosol Therapy   $ Aerosol Therapy Charges Aerosol Treatment   Daily Review of Necessity (SVN) completed   Respiratory Treatment Status (SVN) given   Treatment Route (SVN) mouthpiece   Patient Position (SVN) semi-Rouse's   Post Treatment Assessment (SVN) breath sounds unchanged   Signs of Intolerance (SVN) none   Breath Sounds Post-Respiratory Treatment   Post-treatment Heart Rate (beats/min) 92   Post-treatment Resp Rate (breaths/min) 18   Respiratory Evaluation   $ Care Plan Tech Time 15 min

## 2023-11-29 NOTE — CARE UPDATE
11/29/23 0736   Patient Assessment/Suction   Level of Consciousness (AVPU) alert   Respiratory Effort Normal;Unlabored   Expansion/Accessory Muscles/Retractions no use of accessory muscles;no retractions   All Lung Fields Breath Sounds diminished   Rhythm/Pattern, Respiratory unlabored;depth regular;pattern regular   Cough Frequency infrequent   Cough Type no productive sputum   PRE-TX-O2   Device (Oxygen Therapy) nasal cannula   $ Is the patient on Low Flow Oxygen? Yes   Flow (L/min) 4   SpO2 (!) 91 %   Pulse Oximetry Type Intermittent   $ Pulse Oximetry - Single Charge Pulse Oximetry - Single   Pulse 74   Resp 18   Aerosol Therapy   $ Aerosol Therapy Charges Aerosol Treatment   Daily Review of Necessity (SVN) completed   Respiratory Treatment Status (SVN) given   Treatment Route (SVN) mouthpiece   Patient Position (SVN) semi-Rouse's   Post Treatment Assessment (SVN) breath sounds unchanged   Signs of Intolerance (SVN) none   Breath Sounds Post-Respiratory Treatment   Throughout All Fields Post-Treatment All Fields   Throughout All Fields Post-Treatment aeration increased   Post-treatment Heart Rate (beats/min) 78   Post-treatment Resp Rate (breaths/min) 18   Education   $ Education 15 min;Bronchodilator

## 2023-11-29 NOTE — PLAN OF CARE
spoke to Pt's daughter (Genevieve Pereira (Daughter) [phone] (Mobile) ) concerning Pt's discharge plan. Pt's daughter verbalized plan for Pt's to discharge home with home health; Pt's daughter verbalized she did not have a preference.     sent referral to [facility] home health via careport and observed the reply. Pt will be seen the day after discharge.       11/29/23 1351   Post-Acute Status   Post-Acute Authorization Home Health   Home Health Status Referrals Sent   Discharge Delays None known at this time   Discharge Plan   Discharge Plan A Home Health   Discharge Plan B Home with family

## 2023-11-29 NOTE — PROGRESS NOTES
"[facility] Medicine  Progress Note    Patient Name: Amanda Pereira  MRN: [id number]  Patient Class: IP- Inpatient   Admission Date: 11/26/2023  Length of Stay: 3 days  Attending Physician: Timothy Smith MD  Primary Care Provider: Kareem Sosa MD        Subjective:     Principal Problem:Hypoxia        HPI:  77-year-old female, recently admitted to [facility] for covid with hypoxia, treated with steroids, sent home with 2L oxygen and 3 days of prednisone.  Daughter states patient did very well after discharge initially.  Last prednisone use was Friday.  Saturday night patient became worse with body aches, malaise, chills.  At 3:00 a.m. she had been trying to get out of bed, legs became weak, was found on the ground with head bruise.  Daughter thinks she passed out, she was not very "with it" upon being found.  Hematoma discovered on forehead.  Up until that time she had not been eating very much however this is not unusual for her, daughter states "she eats like a bird."      In the ER patient was clinically dehydrated, dry mouth and lips.  Normotensive but tachycardic.  She was requiring 4 L of oxygen to maintain saturations greater than 90%, compared to the 2 L of oxygen Rx'd upon discharge.      In the ER CT PE study with bilateral small pleural effusions, atelectasis, septal thickening possibly due to pulmonary edema, no PE.  CT head and C-spine without acute changes.     Patient to be admitted for COVID pneumonia with syncope, failed outpatient therapy.       Overview/Hospital Course:  11/27  Assumed care. Chart reviewed. Labs reviewed and noted below: normal CBC; mild hypokalemia (sliding scale) with normal renal function; cardiac biomarkers are normal, as are TSH and lactate. . Telemetry reviewed: sinus. Feels "Much better" since admission. Requiring 4 lpm to achieve 94% sats. Plan: continue current regimen; AM labs for review.    11/28  Resting comfortably in bed. Daughter at bedside. " "Speaking in sentences. Breathing easily, but requiring 4 lpm to maintain saturations (normally uses 2 lpm at home). No orthopnea. Has dry cough. No CP. Remdesivir was discontinued yesterday (was day 11 after diagnosis). Labs reviewed and noted below: normal CBC, normal electrolytes and renal function. Plan: will continue current regimen and re-evaluate in AM with hopes of discharge on po regimen. Discussed with daughter and patient, both of whom expressed understanding    11/29  Admitted for worsening COVID pneumonia. Remains hypoxic. Not on remdesivir as above. On Solumedrol 40 mg q8h and budesonide neb q12h. Hypoxia not improving. Looks and feels "Good" though. No CP. Does feel SOB(E) after approximately 15-20 feet of walking in room. No orthopnea, PND or edema. CTA on admission: no PE Never smoker.. Labs reviewed and noted below: normal CBC; normal BMP except moderate hyperglycemia (steroids). Plan: Pulmonology opinion; low dose sliding scale; continue current regimen; TSH with AM labs for review; ECHO      Interval History: persisting hypoxia    Review of Systems   Constitutional:  Positive for fatigue.   HENT: Negative.     Eyes: Negative.    Respiratory:  Positive for shortness of breath.    Cardiovascular: Negative.    Gastrointestinal: Negative.    Endocrine: Negative.    Genitourinary: Negative.    Musculoskeletal: Negative.    Skin: Negative.    Allergic/Immunologic: Negative.    Neurological: Negative.    Hematological: Negative.    All other systems reviewed and are negative.    Objective:     Vital Signs (Most Recent):  Temp: 98.6 °F (37 °C) (11/29/23 0736)  Pulse: 71 (11/29/23 1100)  Resp: 18 (11/29/23 1100)  BP: 129/66 (11/29/23 1100)  SpO2: (!) 90 % (11/29/23 1100) Vital Signs (24h Range):  Temp:  [97.7 °F (36.5 °C)-98.6 °F (37 °C)] 98.6 °F (37 °C)  Pulse:  [71-95] 71  Resp:  [17-18] 18  SpO2:  [90 %-95 %] 90 %  BP: (121-142)/(63-67) 129/66     Weight: 64.1 kg (141 lb 5 oz)  Body mass index is 25.85 " kg/m².  No intake or output data in the 24 hours ending 11/29/23 1428      Physical Exam  Vitals and nursing note reviewed.   Constitutional:       Appearance: She is well-developed.   HENT:      Head: Normocephalic and atraumatic.      Right Ear: External ear normal.      Left Ear: External ear normal.      Nose: Nose normal.   Eyes:      Conjunctiva/sclera: Conjunctivae normal.      Pupils: Pupils are equal, round, and reactive to light.   Cardiovascular:      Rate and Rhythm: Normal rate and regular rhythm.      Heart sounds: Normal heart sounds.   Pulmonary:      Effort: Pulmonary effort is normal.      Breath sounds: Normal breath sounds.      Comments: Decreased entry bases without adventitious sounds    Abdominal:      General: Bowel sounds are normal.      Palpations: Abdomen is soft.   Musculoskeletal:         General: Normal range of motion.      Cervical back: Normal range of motion and neck supple.   Skin:     General: Skin is warm and dry.      Capillary Refill: Capillary refill takes less than 2 seconds.   Neurological:      Mental Status: She is alert and oriented to person, place, and time.   Psychiatric:         Behavior: Behavior normal.         Thought Content: Thought content normal.         Judgment: Judgment normal.             Significant Labs: All pertinent labs within the past 24 hours have been reviewed.  BMP:   Recent Labs   Lab 11/29/23  0439   *      K 3.9      CO2 26   BUN 19   CREATININE 0.6   CALCIUM 7.7*   MG 1.9     CMP:   Recent Labs   Lab 11/28/23  0454 11/29/23  0439    139   K 3.8 3.9    108   CO2 27 26   * 200*   BUN 17 19   CREATININE 0.5 0.6   CALCIUM 7.5* 7.7*   ANIONGAP 4* 5*       Significant Imaging: I have reviewed all pertinent imaging results/findings within the past 24 hours.    Assessment/Plan:      No notes have been filed under this hospital service.  Service: Hospital Medicine    VTE Risk Mitigation (From admission, onward)            Ordered     enoxaparin injection 40 mg  Daily         11/26/23 1505     IP VTE HIGH RISK PATIENT  Once         11/26/23 1504     Place sequential compression device  Until discontinued         11/26/23 1504                    Discharge Planning   RITA: 11/30/2023     Code Status: Full Code   Is the patient medically ready for discharge?:     Reason for patient still in hospital (select all that apply): Patient trending condition, Laboratory test, and Treatment  Discharge Plan A: Home Health   Discharge Delays: None known at this time              Timothy Smith MD  Department of Hospital Medicine   [facility] hospital

## 2023-11-29 NOTE — PT/OT/SLP PROGRESS
Occupational Therapy   Treatment    Name: Amanda Pereira  MRN: [id number]  Admitting Diagnosis:  Hypoxia       Recommendations:     Discharge Recommendations: Low Intensity Therapy  Discharge Equipment Recommendations:  none  Barriers to discharge:  Other (Comment) (lives alone)    Assessment:     Amanda Pereira is a 77 y.o. female with a medical diagnosis of Hypoxia.  Performance deficits affecting function are weakness, impaired endurance, impaired self care skills, gait instability, impaired functional mobility, impaired balance, impaired cardiopulmonary response to activity.     Pt completed ADLs and functional mobility with SBA-CGA on 4L of oxygen.  Pt reported mild lightheadedness/shortness of breath after completing toileting and grooming at the sink; pt assisted back to bed.      Rehab Prognosis:  Fair; patient would benefit from acute skilled OT services to address these deficits and reach maximum level of function.       Plan:     Patient to be seen 3 x/week to address the above listed problems via self-care/home management, therapeutic activities, therapeutic exercises  Plan of Care Expires: 12/27/23  Plan of Care Reviewed with: patient    Subjective     Pain/Comfort:  Pain Rating 1: 0/10  Pain Rating Post-Intervention 1: 0/10    Objective:     Communicated with: nurse prior to session.  Patient found supine with telemetry, oxygen, PureWick upon OT entry to room.    General Precautions: Standard, fall, airborne, droplet, contact      Respiratory Status: Nasal cannula, flow 4 L/min     Occupational Performance:     Bed Mobility:    Patient completed Supine to Sit with stand by assistance  Patient completed Sit to Supine with stand by assistance     Functional Mobility/Transfers:  Patient completed Sit <> Stand Transfer with contact guard assistance  with  hand-held assist   Patient completed Toilet Transfer Stand Pivot technique with contact guard assistance with  hand-held assist and grab bars  Functional  Mobility: CGA in room for ADLs with hand held assist    Activities of Daily Living:  Grooming: stand by assistance standing at sink for hand washing/oral care  Toileting: contact guard assistance for stability during clothing management    Patient left HOB elevated with all lines intact, call button in reach, and bed alarm on    GOALS:   Multidisciplinary Problems       Occupational Therapy Goals          Problem: Occupational Therapy    Goal Priority Disciplines Outcome Interventions   Occupational Therapy Goal     OT, PT/OT     Description: Goals to be met by: 12/27/23     Patient will increase functional independence with ADLs by performing:    UE Dressing with Modified [place].  LE Dressing with Modified [place].  Grooming while standing at sink with Modified [place].  Toileting from toilet with Modified [place] for hygiene and clothing management.   Toilet transfer to toilet with Modified [place].                         Time Tracking:     OT Date of Treatment: 11/29/23  OT Start Time: 1038  OT Stop Time: 1107  OT Total Time (min): 29 min    Billable Minutes:Self Care/Home Management 29    OT/PREETI: OT          11/29/2023

## 2023-11-29 NOTE — PT/OT/SLP PROGRESS
Physical Therapy Treatment    Patient Name:  Amanda Pereira   MRN:  [id number]    Recommendations:     Discharge Recommendations: Low Intensity Therapy  Discharge Equipment Recommendations: none  Barriers to discharge:  desat with limited mobility, increase O2 needs    Assessment:     Amanda Pereira is a 77 y.o. female admitted with a medical diagnosis of Hypoxia.  She presents with the following impairments/functional limitations: weakness, impaired endurance, impaired self care skills, impaired functional mobility, gait instability, impaired balance, decreased lower extremity function, decreased safety awareness, pain, impaired cardiopulmonary response to activity.    Pt found in bed with HOB elevated. Pt SAO2 at rest in bed 88% on 4L at rest.  Increase to 5L for prior to mobility. Pt ambulated 60 ft with HHA and CGA with mild shortness of breath and SAO2 86%. After brief seated rest break patient ambulated 60 ft again with HHA and CGA with reports of decrease shortness of breath 88%. Pt tolerated return to 4L at end of session at rest in chair    Rehab Prognosis: Fair; patient would benefit from acute skilled PT services to address these deficits and reach maximum level of function.    Recent Surgery: * No surgery found *      Plan:     During this hospitalization, patient to be seen 5 x/week to address the identified rehab impairments via gait training, therapeutic activities, therapeutic exercises, neuromuscular re-education and progress toward the following goals:    Plan of Care Expires:  12/29/23    Subjective     Chief Complaint: none stated  Patient/Family Comments/goals: get stronger  Pain/Comfort:  Pain Rating 1: 0/10      Objective:     Communicated with RN prior to session.  Patient found HOB elevated with peripheral IV, telemetry, oxygen upon PT entry to room.     General Precautions: Standard, fall, airborne, contact, droplet  Orthopedic Precautions: N/A  Braces: N/A  Respiratory Status: Nasal cannula, flow  4 L/min     Functional Mobility:  Bed Mobility:     Supine to Sit: supervision  Transfers:     Sit to Stand:  contact guard assistance with rolling walker  Gait: 60 ft x 2 with HHA and CGA limited due to desat to 86-88% on 5L      AM-PAC 6 CLICK MOBILITY          Treatment & Education:  Pt educated on POC, discharge recommendation, importance of time OOB, pursed lip breathing, pacing/energy conservation, need for assist with mobility, use of call bell to seek assistance as needed and fall prevention      Patient left up in chair with all lines intact, call button in reach, and RN notified..    GOALS:   Multidisciplinary Problems       Physical Therapy Goals          Problem: Physical Therapy    Goal Priority Disciplines Outcome Goal Variances Interventions   Physical Therapy Goal     PT, PT/OT Ongoing, Progressing     Description: Goals to be met by: 23     Patient will increase functional independence with mobility by performin. Supine to sit with Supervision  2. Sit to stand transfer with Supervision  3. Bed to chair transfer with Supervision using Rolling Walker  4. Gait  x 150 feet with Supervision using Rolling Walker.                              Time Tracking:     PT Received On: 23  PT Start Time: 1133     PT Stop Time: 1149  PT Total Time (min): 16 min     Billable Minutes: Gait Training 16    Treatment Type: Treatment  PT/PTA: PT     Number of PTA visits since last PT visit: 0     2023

## 2023-11-29 NOTE — SUBJECTIVE & OBJECTIVE
Interval History: persisting hypoxia    Review of Systems   Constitutional:  Positive for fatigue.   HENT: Negative.     Eyes: Negative.    Respiratory:  Positive for shortness of breath.    Cardiovascular: Negative.    Gastrointestinal: Negative.    Endocrine: Negative.    Genitourinary: Negative.    Musculoskeletal: Negative.    Skin: Negative.    Allergic/Immunologic: Negative.    Neurological: Negative.    Hematological: Negative.    All other systems reviewed and are negative.    Objective:     Vital Signs (Most Recent):  Temp: 98.6 °F (37 °C) (11/29/23 0736)  Pulse: 71 (11/29/23 1100)  Resp: 18 (11/29/23 1100)  BP: 129/66 (11/29/23 1100)  SpO2: (!) 90 % (11/29/23 1100) Vital Signs (24h Range):  Temp:  [97.7 °F (36.5 °C)-98.6 °F (37 °C)] 98.6 °F (37 °C)  Pulse:  [71-95] 71  Resp:  [17-18] 18  SpO2:  [90 %-95 %] 90 %  BP: (121-142)/(63-67) 129/66     Weight: 64.1 kg (141 lb 5 oz)  Body mass index is 25.85 kg/m².  No intake or output data in the 24 hours ending 11/29/23 1428      Physical Exam  Vitals and nursing note reviewed.   Constitutional:       Appearance: She is well-developed.   HENT:      Head: Normocephalic and atraumatic.      Right Ear: External ear normal.      Left Ear: External ear normal.      Nose: Nose normal.   Eyes:      Conjunctiva/sclera: Conjunctivae normal.      Pupils: Pupils are equal, round, and reactive to light.   Cardiovascular:      Rate and Rhythm: Normal rate and regular rhythm.      Heart sounds: Normal heart sounds.   Pulmonary:      Effort: Pulmonary effort is normal.      Breath sounds: Normal breath sounds.      Comments: Decreased entry bases without adventitious sounds    Abdominal:      General: Bowel sounds are normal.      Palpations: Abdomen is soft.   Musculoskeletal:         General: Normal range of motion.      Cervical back: Normal range of motion and neck supple.   Skin:     General: Skin is warm and dry.      Capillary Refill: Capillary refill takes less than 2  seconds.   Neurological:      Mental Status: She is alert and oriented to person, place, and time.   Psychiatric:         Behavior: Behavior normal.         Thought Content: Thought content normal.         Judgment: Judgment normal.             Significant Labs: All pertinent labs within the past 24 hours have been reviewed.  BMP:   Recent Labs   Lab 11/29/23 0439   *      K 3.9      CO2 26   BUN 19   CREATININE 0.6   CALCIUM 7.7*   MG 1.9     CMP:   Recent Labs   Lab 11/28/23 0454 11/29/23 0439    139   K 3.8 3.9    108   CO2 27 26   * 200*   BUN 17 19   CREATININE 0.5 0.6   CALCIUM 7.5* 7.7*   ANIONGAP 4* 5*       Significant Imaging: I have reviewed all pertinent imaging results/findings within the past 24 hours.

## 2023-11-30 PROBLEM — R09.02 HYPOXIA: Status: RESOLVED | Noted: 2023-11-26 | Resolved: 2023-11-30

## 2023-11-30 LAB
ANION GAP SERPL CALC-SCNC: 4 MMOL/L (ref 8–16)
BASOPHILS # BLD AUTO: 0.02 K/UL (ref 0–0.2)
BASOPHILS NFR BLD: 0.2 % (ref 0–1.9)
BNP SERPL-MCNC: 205 PG/ML (ref 0–99)
BUN SERPL-MCNC: 17 MG/DL (ref 8–23)
CALCIUM SERPL-MCNC: 7.6 MG/DL (ref 8.7–10.5)
CHLORIDE SERPL-SCNC: 107 MMOL/L (ref 95–110)
CO2 SERPL-SCNC: 27 MMOL/L (ref 23–29)
CREAT SERPL-MCNC: 0.6 MG/DL (ref 0.5–1.4)
DIFFERENTIAL METHOD: ABNORMAL
EOSINOPHIL # BLD AUTO: 0 K/UL (ref 0–0.5)
EOSINOPHIL NFR BLD: 0 % (ref 0–8)
ERYTHROCYTE [DISTWIDTH] IN BLOOD BY AUTOMATED COUNT: 14.5 % (ref 11.5–14.5)
EST. GFR  (NO RACE VARIABLE): >60 ML/MIN/1.73 M^2
GLUCOSE SERPL-MCNC: 146 MG/DL (ref 70–110)
HCT VFR BLD AUTO: 40.1 % (ref 37–48.5)
HGB BLD-MCNC: 12.8 G/DL (ref 12–16)
IMM GRANULOCYTES # BLD AUTO: 0.07 K/UL (ref 0–0.04)
IMM GRANULOCYTES NFR BLD AUTO: 0.7 % (ref 0–0.5)
LYMPHOCYTES # BLD AUTO: 1.5 K/UL (ref 1–4.8)
LYMPHOCYTES NFR BLD: 14.8 % (ref 18–48)
MAGNESIUM SERPL-MCNC: 1.9 MG/DL (ref 1.6–2.6)
MCH RBC QN AUTO: 29.2 PG (ref 27–31)
MCHC RBC AUTO-ENTMCNC: 31.9 G/DL (ref 32–36)
MCV RBC AUTO: 91 FL (ref 82–98)
MONOCYTES # BLD AUTO: 0.8 K/UL (ref 0.3–1)
MONOCYTES NFR BLD: 8.1 % (ref 4–15)
MRSA SCREEN BY PCR: NEGATIVE
NEUTROPHILS # BLD AUTO: 7.8 K/UL (ref 1.8–7.7)
NEUTROPHILS NFR BLD: 76.2 % (ref 38–73)
NRBC BLD-RTO: 0 /100 WBC
PLATELET # BLD AUTO: 421 K/UL (ref 150–450)
PMV BLD AUTO: 10.5 FL (ref 9.2–12.9)
POTASSIUM SERPL-SCNC: 3.8 MMOL/L (ref 3.5–5.1)
PROCALCITONIN SERPL IA-MCNC: <0.05 NG/ML (ref 0–0.5)
RBC # BLD AUTO: 4.39 M/UL (ref 4–5.4)
SODIUM SERPL-SCNC: 138 MMOL/L (ref 136–145)
TSH SERPL DL<=0.005 MIU/L-ACNC: 0.75 UIU/ML (ref 0.34–5.6)
VANCOMYCIN TROUGH SERPL-MCNC: 12 UG/ML
WBC # BLD AUTO: 10.23 K/UL (ref 3.9–12.7)

## 2023-11-30 PROCEDURE — 97535 SELF CARE MNGMENT TRAINING: CPT

## 2023-11-30 PROCEDURE — 83880 ASSAY OF NATRIURETIC PEPTIDE: CPT | Performed by: STUDENT IN AN ORGANIZED HEALTH CARE EDUCATION/TRAINING PROGRAM

## 2023-11-30 PROCEDURE — 94761 N-INVAS EAR/PLS OXIMETRY MLT: CPT

## 2023-11-30 PROCEDURE — 99900035 HC TECH TIME PER 15 MIN (STAT)

## 2023-11-30 PROCEDURE — 87641 MR-STAPH DNA AMP PROBE: CPT | Performed by: INTERNAL MEDICINE

## 2023-11-30 PROCEDURE — 97530 THERAPEUTIC ACTIVITIES: CPT

## 2023-11-30 PROCEDURE — 94640 AIRWAY INHALATION TREATMENT: CPT

## 2023-11-30 PROCEDURE — 27000221 HC OXYGEN, UP TO 24 HOURS

## 2023-11-30 PROCEDURE — 36415 COLL VENOUS BLD VENIPUNCTURE: CPT | Performed by: STUDENT IN AN ORGANIZED HEALTH CARE EDUCATION/TRAINING PROGRAM

## 2023-11-30 PROCEDURE — 63600175 PHARM REV CODE 636 W HCPCS: Performed by: INTERNAL MEDICINE

## 2023-11-30 PROCEDURE — 99223 1ST HOSP IP/OBS HIGH 75: CPT | Mod: ,,, | Performed by: INTERNAL MEDICINE

## 2023-11-30 PROCEDURE — 36415 COLL VENOUS BLD VENIPUNCTURE: CPT | Performed by: INTERNAL MEDICINE

## 2023-11-30 PROCEDURE — 80048 BASIC METABOLIC PNL TOTAL CA: CPT | Performed by: INTERNAL MEDICINE

## 2023-11-30 PROCEDURE — 99223 PR INITIAL HOSPITAL CARE,LEVL III: ICD-10-PCS | Mod: ,,, | Performed by: INTERNAL MEDICINE

## 2023-11-30 PROCEDURE — 25000003 PHARM REV CODE 250: Performed by: INTERNAL MEDICINE

## 2023-11-30 PROCEDURE — 83735 ASSAY OF MAGNESIUM: CPT | Performed by: INTERNAL MEDICINE

## 2023-11-30 PROCEDURE — 85025 COMPLETE CBC W/AUTO DIFF WBC: CPT | Performed by: INTERNAL MEDICINE

## 2023-11-30 PROCEDURE — 84145 PROCALCITONIN (PCT): CPT | Performed by: STUDENT IN AN ORGANIZED HEALTH CARE EDUCATION/TRAINING PROGRAM

## 2023-11-30 PROCEDURE — 97116 GAIT TRAINING THERAPY: CPT | Mod: CQ

## 2023-11-30 PROCEDURE — 21400001 HC TELEMETRY ROOM

## 2023-11-30 PROCEDURE — 84443 ASSAY THYROID STIM HORMONE: CPT | Performed by: INTERNAL MEDICINE

## 2023-11-30 PROCEDURE — 80202 ASSAY OF VANCOMYCIN: CPT | Performed by: STUDENT IN AN ORGANIZED HEALTH CARE EDUCATION/TRAINING PROGRAM

## 2023-11-30 PROCEDURE — 25000242 PHARM REV CODE 250 ALT 637 W/ HCPCS: Performed by: INTERNAL MEDICINE

## 2023-11-30 RX ADMIN — BUDESONIDE INHALATION 0.5 MG: 0.5 SUSPENSION RESPIRATORY (INHALATION) at 07:11

## 2023-11-30 RX ADMIN — ESCITALOPRAM OXALATE 20 MG: 10 TABLET ORAL at 10:11

## 2023-11-30 RX ADMIN — ENOXAPARIN SODIUM 40 MG: 40 INJECTION SUBCUTANEOUS at 09:11

## 2023-11-30 RX ADMIN — DOXEPIN HYDROCHLORIDE 25 MG: 25 CAPSULE ORAL at 09:11

## 2023-11-30 RX ADMIN — BUDESONIDE INHALATION 0.5 MG: 0.5 SUSPENSION RESPIRATORY (INHALATION) at 08:11

## 2023-11-30 RX ADMIN — LAMOTRIGINE 150 MG: 100 TABLET ORAL at 10:11

## 2023-11-30 RX ADMIN — TRAZODONE HYDROCHLORIDE 150 MG: 50 TABLET ORAL at 09:11

## 2023-11-30 RX ADMIN — MUPIROCIN 1 G: 20 OINTMENT TOPICAL at 09:11

## 2023-11-30 RX ADMIN — METHYLPREDNISOLONE SODIUM SUCCINATE 40 MG: 40 INJECTION, POWDER, FOR SOLUTION INTRAMUSCULAR; INTRAVENOUS at 05:11

## 2023-11-30 RX ADMIN — VANCOMYCIN HYDROCHLORIDE 1000 MG: 1 INJECTION, POWDER, LYOPHILIZED, FOR SOLUTION INTRAVENOUS at 05:11

## 2023-11-30 RX ADMIN — LAMOTRIGINE 150 MG: 100 TABLET ORAL at 09:11

## 2023-11-30 NOTE — PROGRESS NOTES
Pharmacokinetic Assessment Follow Up: IV Vancomycin    Vancomycin serum concentration assessment(s):    The trough level was drawn correctly and can be used to guide therapy at this time. The measurement is below the desired definitive target range of 10 to 15 mcg/mL.    Vancomycin Regimen Plan:    1000 mg every 12 hours    Drug levels (last 3 results):  Recent Labs   Lab Result Units 11/29/23  1549   Vancomycin-Trough ug/mL 3.0*       Pharmacy will continue to follow and monitor vancomycin.    Please contact pharmacy at extension [phone] for questions regarding this assessment.    Thank you for the consult,   Nayeli Haynes       Patient brief summary:  Amanda Pereira is a 77 y.o. female initiated on antimicrobial therapy with IV Vancomycin for treatment of lower respiratory infection    The patient's current regimen is 1000 mg every 12 hours    Drug Allergies:   Review of patient's allergies indicates:   Allergen Reactions    Pcn [penicillins] Hives    Promethazine hcl Other (See Comments)     Gets muscle spasams    Reglan [metoclopramide hcl] Other (See Comments)     Gets muscle spasams       Actual Body Weight:   64.1 kg    Renal Function:   Estimated Creatinine Clearance: 70.1 mL/min (based on SCr of 0.6 mg/dL).,     Dialysis Method (if applicable):  N/A    CBC (last 72 hours):  Recent Labs   Lab Result Units 11/27/23  0507 11/28/23 0454 11/29/23 0439   WBC K/uL 5.95 9.94 8.57   Hemoglobin g/dL 12.8 12.6 13.0   Hematocrit % 40.0 38.2 40.4   Platelets K/uL 241 367 424   Gran % % 80.0* 77.2* 82.7*   Lymph % % 13.9* 13.6* 10.7*   Mono % % 4.7 8.6 6.0   Eosinophil % % 0.0 0.0 0.0   Basophil % % 0.2 0.1 0.1   Differential Method  Automated Automated Automated       Metabolic Panel (last 72 hours):  Recent Labs   Lab Result Units 11/27/23  0507 11/28/23 0454 11/29/23 0439   Sodium mmol/L 140 136 139   Potassium mmol/L 3.3* 3.8 3.9   Chloride mmol/L 109 105 108   CO2 mmol/L 25 27 26   Glucose mg/dL 188* 152*  200*   BUN mg/dL 12 17 19   Creatinine mg/dL 0.6 0.5 0.6   Magnesium mg/dL 2.2 2.0 1.9       Vancomycin Administrations:  vancomycin given in the last 96 hours                     vancomycin in dextrose 5 % 1 gram/250 mL IVPB 1,000 mg (mg) 1,000 mg New Bag 11/29/23 1546     1,000 mg New Bag 11/27/23 1739    vancomycin 1.25 g in dextrose 5% 250 mL IVPB (ready to mix) (mg) 1,250 mg New Bag 11/26/23 1651                    Microbiologic Results:  Microbiology Results (last 7 days)       Procedure Component Value Units Date/Time    Blood culture #1 **CANNOT BE ORDERED STAT** [[id number]] Collected: 11/26/23 1217    Order Status: Completed Specimen: Blood Updated: 11/29/23 1432     Blood Culture, Routine No Growth to date      No Growth to date      No Growth to date      No Growth to date    Narrative:      Collection has been rescheduled by [facility] at 11/26/2023 12:22 Reason:   Done  Collection has been rescheduled by [facility] at 11/26/2023 12:22 Reason:   Done    Blood Culture #2 **CANNOT BE ORDERED STAT** [[id number]] Collected: 11/26/23 1209    Order Status: Completed Specimen: Blood Updated: 11/29/23 1432     Blood Culture, Routine No Growth to date      No Growth to date      No Growth to date      No Growth to date    MRSA Screen by PCR [[id number]]     Order Status: No result Specimen: Nasopharyngeal Swab from Nasal     Culture, Respiratory with Gram Stain [[id number]]     Order Status: No result Specimen: Respiratory     Blood culture #1 **CANNOT BE ORDERED STAT** [[id number]]     Order Status: Canceled Specimen: Blood

## 2023-11-30 NOTE — PROGRESS NOTES
Pharmacy Consult Discontinuation: Vancomycin    Amanda Pereira [id number] is a 78 y.o. female was consulted for vancomycin pharmacotherapy management by pharmacy.    Pharmacy consult for vancomycin dosing is no longer required.  Vancomycin was discontinued 11/30/2023 @1627.    Thank you for allowing us to participate in this patient's care. Should you have any questions or concerns please feel free to contact the pharmacy department at [phone].    Fab Wilson, PharmD

## 2023-11-30 NOTE — PROGRESS NOTES
"[facility] Medicine  Progress Note    Patient Name: Amanda Pereira  MRN: [id number]  Patient Class: IP- Inpatient   Admission Date: 11/26/2023  Length of Stay: 4 days  Attending Physician: Boby Daniels MD  Primary Care Provider: Kareem Sosa MD        Subjective:     Principal Problem:Acute hypoxemic respiratory failure        HPI:  77-year-old female, recently admitted to [facility] for covid with hypoxia, treated with steroids, sent home with 2L oxygen and 3 days of prednisone.  Daughter states patient did very well after discharge initially.  Last prednisone use was Friday.  Saturday night patient became worse with body aches, malaise, chills.  At 3:00 a.m. she had been trying to get out of bed, legs became weak, was found on the ground with head bruise.  Daughter thinks she passed out, she was not very "with it" upon being found.  Hematoma discovered on forehead.  Up until that time she had not been eating very much however this is not unusual for her, daughter states "she eats like a bird."      In the ER patient was clinically dehydrated, dry mouth and lips.  Normotensive but tachycardic.  She was requiring 4 L of oxygen to maintain saturations greater than 90%, compared to the 2 L of oxygen Rx'd upon discharge.      In the ER CT PE study with bilateral small pleural effusions, atelectasis, septal thickening possibly due to pulmonary edema, no PE.  CT head and C-spine without acute changes.     Patient to be admitted for COVID pneumonia with syncope, failed outpatient therapy.       Overview/Hospital Course:  11/27  Assumed care. Chart reviewed. Labs reviewed and noted below: normal CBC; mild hypokalemia (sliding scale) with normal renal function; cardiac biomarkers are normal, as are TSH and lactate. . Telemetry reviewed: sinus. Feels "Much better" since admission. Requiring 4 lpm to achieve 94% sats. Plan: continue current regimen; AM labs for review.    11/28  Resting comfortably in bed. " "Daughter at bedside. Speaking in sentences. Breathing easily, but requiring 4 lpm to maintain saturations (normally uses 2 lpm at home). No orthopnea. Has dry cough. No CP. Remdesivir was discontinued yesterday (was day 11 after diagnosis). Labs reviewed and noted below: normal CBC, normal electrolytes and renal function. Plan: will continue current regimen and re-evaluate in AM with hopes of discharge on po regimen. Discussed with daughter and patient, both of whom expressed understanding    11/29  Admitted for worsening COVID pneumonia. Remains hypoxic. Not on remdesivir as above. On Solumedrol 40 mg q8h and budesonide neb q12h. Hypoxia not improving. Looks and feels "Good" though. No CP. Does feel SOB(E) after approximately 15-20 feet of walking in room. No orthopnea, PND or edema. CTA on admission: no PE Never smoker.. Labs reviewed and noted below: normal CBC; normal BMP except moderate hyperglycemia (steroids). Plan: Pulmonology opinion; low dose sliding scale; continue current regimen; TSH with AM labs for review; ECHO      Interval History:  Patient remains on 4 L nasal cannula.  Pulmonology consulted.  Procalcitonin and BNP ordered.  Have asked nurse to obtain MRSA swab.    Review of Systems   Constitutional:  Positive for fatigue.   HENT: Negative.     Eyes: Negative.    Respiratory:  Positive for shortness of breath.    Cardiovascular: Negative.    Gastrointestinal: Negative.    Endocrine: Negative.    Genitourinary: Negative.    Musculoskeletal: Negative.    Skin: Negative.    Allergic/Immunologic: Negative.    Neurological: Negative.    Hematological: Negative.    All other systems reviewed and are negative.    Objective:     Vital Signs (Most Recent):  Temp: 98 °F (36.7 °C) (11/30/23 1100)  Pulse: 89 (11/30/23 1100)  Resp: 18 (11/30/23 1100)  BP: 122/61 (11/30/23 1100)  SpO2: (!) 93 % (11/30/23 1100) Vital Signs (24h Range):  Temp:  [97.5 °F (36.4 °C)-98.1 °F (36.7 °C)] 98 °F (36.7 °C)  Pulse:  [71-90] " 89  Resp:  [18] 18  SpO2:  [91 %-94 %] 93 %  BP: (116-133)/(56-71) 122/61     Weight: 63.7 kg (140 lb 6.9 oz)  Body mass index is 25.69 kg/m².    Intake/Output Summary (Last 24 hours) at 11/30/2023 1337  Last data filed at 11/30/2023 0449  Gross per 24 hour   Intake 120 ml   Output 700 ml   Net -580 ml         Physical Exam  Vitals and nursing note reviewed.   Constitutional:       Appearance: She is well-developed.   HENT:      Head: Normocephalic and atraumatic.      Right Ear: External ear normal.      Left Ear: External ear normal.      Nose: Nose normal.   Eyes:      Conjunctiva/sclera: Conjunctivae normal.      Pupils: Pupils are equal, round, and reactive to light.   Cardiovascular:      Rate and Rhythm: Normal rate and regular rhythm.      Heart sounds: Normal heart sounds.   Pulmonary:      Effort: Pulmonary effort is normal.      Breath sounds: Normal breath sounds.      Comments: Decreased entry bases without adventitious sounds    Abdominal:      General: Bowel sounds are normal.      Palpations: Abdomen is soft.   Musculoskeletal:         General: Normal range of motion.      Cervical back: Normal range of motion and neck supple.   Skin:     General: Skin is warm and dry.      Capillary Refill: Capillary refill takes less than 2 seconds.   Neurological:      Mental Status: She is alert and oriented to person, place, and time.   Psychiatric:         Behavior: Behavior normal.         Thought Content: Thought content normal.         Judgment: Judgment normal.             Significant Labs: All pertinent labs within the past 24 hours have been reviewed.  BMP:   Recent Labs   Lab 11/30/23  0454   *      K 3.8      CO2 27   BUN 17   CREATININE 0.6   CALCIUM 7.6*   MG 1.9       CMP:   Recent Labs   Lab 11/29/23  0439 11/30/23  0454    138   K 3.9 3.8    107   CO2 26 27   * 146*   BUN 19 17   CREATININE 0.6 0.6   CALCIUM 7.7* 7.6*   ANIONGAP 5* 4*         Significant  Imaging: I have reviewed all pertinent imaging results/findings within the past 24 hours.    Assessment/Plan:      * Acute hypoxemic respiratory failure  Patient with Hypoxic Respiratory failure which is Acute.  she is not on home oxygen. Supplemental oxygen was provided and noted-      .   Signs/symptoms of respiratory failure include- respiratory distress. Contributing diagnoses includes - Pneumonia and COVID  Labs and images were reviewed. Patient Has not had a recent ABG. Will treat underlying causes and adjust management of respiratory failure as follows- continue IV vancomycin and ceftriaxone.  Check procalcitonin and BNP.  Repeat chest x-ray today.  Continue IV Solu-Medrol.  Continue breathing treatments.  Pulmonology consulted.  Wean oxygen as tolerated.    COVID-19  See above   Contact and airborne precautions      VTE Risk Mitigation (From admission, onward)           Ordered     enoxaparin injection 40 mg  Daily         11/26/23 1505     IP VTE HIGH RISK PATIENT  Once         11/26/23 1504     Place sequential compression device  Until discontinued         11/26/23 1504                    Discharge Planning   RITA: 12/2/2023     Code Status: Full Code   Is the patient medically ready for discharge?:     Reason for patient still in hospital (select all that apply): Treatment  Discharge Plan A: Home Health   Discharge Delays: None known at this time              Boby Daniels MD  Department of Hospital Medicine   [facility]

## 2023-11-30 NOTE — CARE UPDATE
11/30/23 0805   Patient Assessment/Suction   Level of Consciousness (AVPU) alert   All Lung Fields Breath Sounds diminished   PRE-TX-O2   Device (Oxygen Therapy) nasal cannula   $ Is the patient on Low Flow Oxygen? Yes   Flow (L/min) 4   SpO2 (!) 94 %   Pulse Oximetry Type Intermittent   $ Pulse Oximetry - Multiple Charge Pulse Oximetry - Multiple   Pulse 90   Resp 18   Aerosol Therapy   $ Aerosol Therapy Charges Aerosol Treatment   Respiratory Treatment Status (SVN) given   Treatment Route (SVN) mouthpiece   Patient Position (SVN) HOB elevated   Signs of Intolerance (SVN) none   Breath Sounds Post-Respiratory Treatment   Throughout All Fields Post-Treatment All Fields   Throughout All Fields Post-Treatment no change   Post-treatment Heart Rate (beats/min) 88   Post-treatment Resp Rate (breaths/min) 18

## 2023-11-30 NOTE — PT/OT/SLP PROGRESS
Occupational Therapy   Treatment    Name: Amanda Pereira  MRN: [id number]  Admitting Diagnosis:  Hypoxia       Recommendations:     Discharge Recommendations: Low Intensity Therapy  Discharge Equipment Recommendations:  none  Barriers to discharge:   (lives alone)    Assessment:     Amanda Pereira is a 78 y.o. female with a medical diagnosis of Hypoxia.  She presents with SOB with activity. Patient participated in sitting/standing ADL activity sitting EOB, standing at the sink and ambulating in the hospital room using no AD. Performance deficits affecting function are weakness, impaired endurance, impaired self care skills, impaired functional mobility, impaired cardiopulmonary response to activity.     Rehab Prognosis:  Fair; patient would benefit from acute skilled OT services to address these deficits and reach maximum level of function.       Plan:     Patient to be seen 3 x/week to address the above listed problems via self-care/home management, therapeutic activities, therapeutic exercises  Plan of Care Expires: 12/27/23  Plan of Care Reviewed with: patient    Subjective     Chief Complaint: SOB with activity  Patient/Family Comments/goals: Improved functional mobility and ADL independence.  Pain/Comfort:  Pain Rating 1: 0/10  Pain Rating Post-Intervention 1: 0/10    Objective:     Communicated with: nurse prior to session.  Patient found HOB elevated with telemetry, peripheral IV, oxygen upon OT entry to room.    General Precautions: Standard, fall, airborne, contact, droplet    Orthopedic Precautions:N/A  Braces: N/A  Respiratory Status: Room air   Patient on room air at the beginning of session. O2 Sat taken after Adl activity with O2 Sat at 84% on room air. O2 taken once patient placed on 4L O2; sats increased to 95%.   Occupational Performance:       Bed Mobility:    Patient completed Scooting/Bridging with stand by assistance  Patient completed Supine to Sit with stand by assistance  Patient completed Sit to  Supine with stand by assistance     Functional Mobility/Transfers:  Patient completed Sit <> Stand Transfer with stand by assistance  with  no assistive device   Patient completed Toilet Transfer Step Transfer technique with stand by assistance with  no AD  Functional Mobility: ambulated 25 feet in the hospital room and bathroom with stand by assistance using no AD.    Activities of Daily Living:  Grooming: stand by assistance to wash hands standing at sink.  Lower Body Dressing: stand by assistance to don/doff socks sitting EOB.      [facility] 6 Click ADL: 19    Treatment & Education:  Patient required 4L O2 via NC to keep sats above 90%.    Patient left HOB elevated with all lines intact and call button in reach    GOALS:   Multidisciplinary Problems       Occupational Therapy Goals          Problem: Occupational Therapy    Goal Priority Disciplines Outcome Interventions   Occupational Therapy Goal     OT, PT/OT Ongoing, Progressing    Description: Goals to be met by: 12/27/23     Patient will increase functional independence with ADLs by performing:    UE Dressing with Modified [place].  LE Dressing with Modified [place].  Grooming while standing at sink with Modified [place].  Toileting from toilet with Modified [place] for hygiene and clothing management.   Toilet transfer to toilet with Modified [place].                         Time Tracking:     OT Date of Treatment: 11/30/23  OT Start Time: 1014  OT Stop Time: 1037  OT Total Time (min): 23 min    Billable Minutes:Self Care/Home Management 12  Therapeutic Activity 11    OT/PREETI: OT          11/30/2023

## 2023-11-30 NOTE — PLAN OF CARE
Problem: Occupational Therapy  Goal: Occupational Therapy Goal  Description: Goals to be met by: 12/27/23     Patient will increase functional independence with ADLs by performing:    UE Dressing with Modified [place].  LE Dressing with Modified [place].  Grooming while standing at sink with Modified [place].  Toileting from toilet with Modified [place] for hygiene and clothing management.   Toilet transfer to toilet with Modified [place].    Outcome: Ongoing, Progressing

## 2023-11-30 NOTE — PT/OT/SLP PROGRESS
Physical Therapy Treatment    Patient Name:  Amanda Pereira   MRN:  [id number]    Recommendations:     Discharge Recommendations: Low Intensity Therapy  Discharge Equipment Recommendations: none  Barriers to discharge:  respiratory deficits, decreased activity tolerance    Assessment:     Amanda Pereira is a 78 y.o. female admitted with a medical diagnosis of Acute hypoxemic respiratory failure.  She presents with the following impairments/functional limitations: weakness, impaired endurance, impaired self care skills, impaired functional mobility, impaired cardiopulmonary response to activity.    Pt agreeable to visit. Pt 86-88% after mobilizing to EOB on 4 L O2, increased to 6 L in preparation for ambulation. Pt ambulated 40' x 2 pushing IV pole with hand hold assist and contact guard assist. Pt required a seated rest break after each trial to recover O2 as pt desated to 88% on the first trial and 89% on the second trial. Pt returned to bed at end of session and O2 decreased back down to 4 L.    Rehab Prognosis: Fair; patient would benefit from acute skilled PT services to address these deficits and reach maximum level of function.    Recent Surgery: * No surgery found *      Plan:     During this hospitalization, patient to be seen 5 x/week to address the identified rehab impairments via gait training, therapeutic activities, therapeutic exercises, neuromuscular re-education and progress toward the following goals:    Plan of Care Expires:  12/29/23    Subjective     Chief Complaint: pt reports feeling 'stuffy' and not able to breath through nose well  Patient/Family Comments/goals: to get better  Pain/Comfort:  Pain Rating 1: 0/10      Objective:     Communicated with RN prior to session.  Patient found HOB elevated with telemetry, peripheral IV, oxygen upon PT entry to room.     General Precautions: Standard, fall, airborne, contact, droplet  Orthopedic Precautions: N/A  Braces: N/A  Respiratory Status: Nasal cannula,  flow 4 L/min     Functional Mobility:  Bed Mobility:     Supine to Sit: supervision  Sit to Supine: supervision  Transfers:     Sit to Stand:  contact guard assistance with no AD  Gait: 2 x 40' pushing IV pole with HHA and CGA      AM-PAC 6 CLICK MOBILITY          Treatment & Education:  Pt educated on importance of time OOB, importance of intermittent mobility, safe techniques for transfers/ambulation, discharge recommendations/options, and use of call light for assistance and fall prevention.      Patient left HOB elevated with all lines intact and call button in reach..    GOALS:   Multidisciplinary Problems       Physical Therapy Goals          Problem: Physical Therapy    Goal Priority Disciplines Outcome Goal Variances Interventions   Physical Therapy Goal     PT, PT/OT Ongoing, Progressing     Description: Goals to be met by: 23     Patient will increase functional independence with mobility by performin. Supine to sit with Supervision  2. Sit to stand transfer with Supervision  3. Bed to chair transfer with Supervision using Rolling Walker  4. Gait  x 150 feet with Supervision using Rolling Walker.                              Time Tracking:     PT Received On: 23  PT Start Time: 1337     PT Stop Time: 1348  PT Total Time (min): 11 min     Billable Minutes: Gait Training 11    Treatment Type: Treatment  PT/PTA: PTA     Number of PTA visits since last PT visit: 2023

## 2023-11-30 NOTE — ASSESSMENT & PLAN NOTE
Patient with Hypoxic Respiratory failure which is Acute.  she is not on home oxygen. Supplemental oxygen was provided and noted-      .   Signs/symptoms of respiratory failure include- respiratory distress. Contributing diagnoses includes - Pneumonia and COVID  Labs and images were reviewed. Patient Has not had a recent ABG. Will treat underlying causes and adjust management of respiratory failure as follows- continue IV vancomycin and ceftriaxone.  Check procalcitonin and BNP.  Repeat chest x-ray today.  Continue IV Solu-Medrol.  Continue breathing treatments.  Pulmonology consulted.  Wean oxygen as tolerated.

## 2023-11-30 NOTE — CONSULTS
Pulmonary/Critical Care Consult      PATIENT NAME: Amanda Pereira  MRN: [id number]  TODAY'S DATE: 2023  5:08 PM  ADMIT DATE: 2023  AGE: 78 y.o. : 1945    CONSULT REQUESTED BY: Boby Daniels MD    REASON FOR CONSULT:   COVID with hypoxic respiratory failure    HPI:  The patient is a 78-year-old female who became positive for COVID on 2023.  She was admitted to the hospital for 3 days and received some Decadron and doxycycline.  She was discharged home on 2 L nasal cannula.  She returned to the hospital on the  with increasing shortness of breath and was found to need 4 L of oxygen.  This time she was given high-dose steroids and antibiotics and remdesivir.  This remdesivir has completed but she is continuing to require 4 L of oxygen.  Her chest CT on admission this time showed increased patchy ground-glass infiltrates.  On physical exam she has posterior crackles.    REVIEW OF SYSTEMS  GENERAL: Feeling sleepy  EYES: Vision is good.  ENT: No sinusitis or pharyngitis.   HEART: No chest pain or palpitations.  LUNGS: No cough, sputum, or wheezing.  GI: No Nausea, vomiting, constipation, diarrhea, or reflux.  : No dysuria, hesitancy, or nocturia.  SKIN: No lesions or rashes.  MUSCULOSKELETAL: No joint pain or myalgias.  NEURO: No headaches or neuropathy.  LYMPH: No edema or adenopathy.  PSYCH: No anxiety or depression.  ENDO:  She had a hot flash a little while ago and now she is feeling cool.  She does not have a fever.    ALLERGIES  Review of patient's allergies indicates:   Allergen Reactions    Pcn [penicillins] Hives    Promethazine hcl Other (See Comments)     Gets muscle spasams    Reglan [metoclopramide hcl] Other (See Comments)     Gets muscle spasams       INPATIENT SCHEDULED MEDICATIONS   budesonide  0.5 mg Nebulization Q12H    doxepin  25 mg Oral QHS    enoxparin  40 mg Subcutaneous Daily    EScitalopram oxalate  20 mg Oral QAM    lamoTRIgine  150 mg Oral BID    [START ON 2023]  methylPREDNISolone sodium succinate injection  40 mg Intravenous Daily    mupirocin   Nasal BID    senna-docusate 8.6-50 mg  1 tablet Oral BID    traZODone  150 mg Oral QHS         MEDICAL AND SURGICAL HISTORY  Past Medical History:   Diagnosis Date    Depression     Diabetes mellitus     borderline    Kidney stone      Past Surgical History:   Procedure Laterality Date    CYSTOSCOPY N/A 9/21/2022    Procedure: CYSTOSCOPY;  Surgeon: Pepper Salas MD;  Location: [facility];  Service: Urology;  Laterality: N/A;    EXTRACORPOREAL SHOCK WAVE LITHOTRIPSY Left 9/21/2022    Procedure: LITHOTRIPSY, ESWL;  Surgeon: Pepper Salas MD;  Location: [facility] OR;  Service: Urology;  Laterality: Left;    RETROGRADE PYELOGRAPHY Left 9/21/2022    Procedure: PYELOGRAM, RETROGRADE;  Surgeon: Pepper Salas MD;  Location: [facility] OR;  Service: Urology;  Laterality: Left;    TONSILLECTOMY, ADENOIDECTOMY      TUBAL LIGATION      WISDOM TOOTH EXTRACTION         ALCOHOL, TOBACCO AND DRUG USE  Social History     Tobacco Use   Smoking Status Never   Smokeless Tobacco Never     Social History     Substance and Sexual Activity   Alcohol Use No     Social History     Substance and Sexual Activity   Drug Use No       FAMILY HISTORY  Family History   Problem Relation Age of Onset    Coronary artery disease Father     Urolithiasis Mother     Diabetes Maternal Grandmother     Thyroid disease Sister     Prostate cancer Neg Hx     Kidney cancer Neg Hx        VITAL SIGNS (MOST RECENT)  Temp: 97.8 °F (36.6 °C) (11/30/23 1500)  Pulse: 83 (11/30/23 1500)  Resp: 18 (11/30/23 1500)  BP: 123/60 (11/30/23 1500)  SpO2: (!) 91 % (11/30/23 1500)    INTAKE AND OUTPUT (LAST 24 HOURS):  Intake/Output Summary (Last 24 hours) at 11/30/2023 1708  Last data filed at 11/30/2023 1540  Gross per 24 hour   Intake 360 ml   Output 700 ml   Net -340 ml       WEIGHT  Wt Readings from Last 1 Encounters:   11/30/23 63.7 kg (140 lb 6.9 oz)       PHYSICAL  EXAM  GENERAL: Older patient in no distress.  HEENT: Pupils equal and reactive. Extraocular movements intact. Nose intact. Pharynx moist.  NECK: Supple.   HEART: Regular rate and rhythm. No murmur or gallop auscultated.  LUNGS:  There are crackles throughout the posterior lung fields. Lung excursion symmetrical. No change in fremitus.   ABDOMEN: Bowel sounds present. Non-tender, no masses palpated.  : Normal anatomy.  EXTREMITIES: Normal muscle tone and joint movement, no cyanosis or clubbing.   LYMPHATICS: No adenopathy palpated, no edema.  SKIN: Dry, intact, no lesions.   NEURO: Cranial nerves II-XII intact. Motor strength 5/5 bilaterally, upper and lower extremities.  PSYCH: Appropriate affect      CBC LAST (LAST 24 HOURS)  Recent Labs   Lab 11/30/23  0454   WBC 10.23   RBC 4.39   HGB 12.8   HCT 40.1   MCV 91   MCH 29.2   MCHC 31.9*   RDW 14.5      MPV 10.5   GRAN 76.2*  7.8*   LYMPH 14.8*  1.5   MONO 8.1  0.8   BASO 0.02   NRBC 0       CHEMISTRY LAST (LAST 24 HOURS)  Recent Labs   Lab 11/30/23  0454      K 3.8      CO2 27   ANIONGAP 4*   BUN 17   CREATININE 0.6   *   CALCIUM 7.6*   MG 1.9         CARDIAC PROFILE (LAST 24 HOURS)  Recent Labs   Lab 11/26/23  1154 11/26/23  1324 11/26/23  1742 11/30/23  1455   BNP  --    < >  --  205*   CPK 35  --   --   --    TROPONINIHS 7.3   < > 7.4  --     < > = values in this interval not displayed.       LAST 7 DAYS MICROBIOLOGY   Microbiology Results (last 7 days)       Procedure Component Value Units Date/Time    Blood culture #1 **CANNOT BE ORDERED STAT** [[id number]] Collected: 11/26/23 1217    Order Status: Completed Specimen: Blood Updated: 11/30/23 1432     Blood Culture, Routine No Growth to date      No Growth to date      No Growth to date      No Growth to date      No Growth to date    Narrative:      Collection has been rescheduled by [facility] at 11/26/2023 12:22 Reason:   Done  Collection has been rescheduled by [facility] at 11/26/2023  12:22 Reason:   Done    Blood Culture #2 **CANNOT BE ORDERED STAT** [[id number]] Collected: 11/26/23 1209    Order Status: Completed Specimen: Blood Updated: 11/30/23 1432     Blood Culture, Routine No Growth to date      No Growth to date      No Growth to date      No Growth to date      No Growth to date    MRSA Screen by PCR [[id number]] Collected: 11/30/23 1330    Order Status: Sent Specimen: Nasopharyngeal Swab from Nasal Updated: 11/30/23 1336    Culture, Respiratory with Gram Stain [[id number]]     Order Status: No result Specimen: Respiratory     Blood culture #1 **CANNOT BE ORDERED STAT** [[id number]]     Order Status: Canceled Specimen: Blood             MOST RECENT IMAGING  X-Ray Chest 1 View  XR CHEST 1 VIEW    CLINICAL HISTORY:  78 years Female covid/hypoxia    COMPARISON: CT chest November 26, 2023    FINDINGS: Cardiac silhouette size is within normal limits for portable technique. Patchy groundglass and interstitial opacities bilaterally, consistent with Covid pneumonia. Blunting of costophrenic angle suggesting trace pleural fluid. No pneumothorax. No acute osseous abnormality.    IMPRESSION:    Patchy groundglass and interstitial opacities bilaterally consistent with Covid pneumonia considering the provided history.    Electronically signed by:  Rohith Miller MD  11/30/2023 01:54 PM CST Workstation: [id number]      CURRENT VISIT EKG  Results for orders placed or performed during the hospital encounter of 11/26/23   EKG 12-lead    Narrative    Test Reason : R55,    Vent. Rate : 104 BPM     Atrial Rate : 104 BPM     P-R Int : 122 ms          QRS Dur : 078 ms      QT Int : 326 ms       P-R-T Axes : 005 063 057 degrees     QTc Int : 428 ms    Sinus tachycardia  Otherwise normal ECG  When compared with ECG of 20-NOV-2023 08:17,  No significant change was found    Referred By: [email or web address]   SELF           Confirmed By:        ECHOCARDIOGRAM RESULTS  Results for orders placed during the hospital encounter  of 11/26/23    Echo Saline Bubble? No    Interpretation Summary    Left Ventricle: The left ventricle is normal in size. Normal wall thickness. Normal wall motion. There is normal systolic function with a visually estimated ejection fraction of 60 - 65%. There is normal diastolic function.    Right Ventricle: Normal right ventricular cavity size. Wall thickness is normal. Right ventricle wall motion  is normal. Systolic function is normal.    IVC/SVC: Normal venous pressure at 3 mmHg.        Oxygen INFORMATION       4 L       LAST ARTERIAL BLOOD GAS  ABG  Recent Labs   Lab 11/26/23  1556   PH 7.421   PO2 57   PCO2 43.7   HCO3 28.4*   BE 4*       IMPRESSION AND PLAN  COVID pneumonia now 12 days out  Acute hypoxic respiratory failure requiring 4 L of oxygen  No predecedent pulmonary pathology    Feel the patient is clinically stable.  She can discharge home on a long prednisone taper and 4 L of oxygen.  We will see her in the office in 2 weeks.  She knows to call the office if she is feeling any worse or anymore short of breath in the interim.  I am hopeful that this is just an unusually significant COVID pneumonia for this variant and is not proceeding to pulmonary fibrosis.    Discussed with nursing and the hospitalist and patient    Torrie France MD  Date of Service: 11/30/2023  5:08 PM

## 2023-11-30 NOTE — SUBJECTIVE & OBJECTIVE
Interval History:  Patient remains on 4 L nasal cannula.  Pulmonology consulted.  Procalcitonin and BNP ordered.  Have asked nurse to obtain MRSA swab.    Review of Systems   Constitutional:  Positive for fatigue.   HENT: Negative.     Eyes: Negative.    Respiratory:  Positive for shortness of breath.    Cardiovascular: Negative.    Gastrointestinal: Negative.    Endocrine: Negative.    Genitourinary: Negative.    Musculoskeletal: Negative.    Skin: Negative.    Allergic/Immunologic: Negative.    Neurological: Negative.    Hematological: Negative.    All other systems reviewed and are negative.    Objective:     Vital Signs (Most Recent):  Temp: 98 °F (36.7 °C) (11/30/23 1100)  Pulse: 89 (11/30/23 1100)  Resp: 18 (11/30/23 1100)  BP: 122/61 (11/30/23 1100)  SpO2: (!) 93 % (11/30/23 1100) Vital Signs (24h Range):  Temp:  [97.5 °F (36.4 °C)-98.1 °F (36.7 °C)] 98 °F (36.7 °C)  Pulse:  [71-90] 89  Resp:  [18] 18  SpO2:  [91 %-94 %] 93 %  BP: (116-133)/(56-71) 122/61     Weight: 63.7 kg (140 lb 6.9 oz)  Body mass index is 25.69 kg/m².    Intake/Output Summary (Last 24 hours) at 11/30/2023 1337  Last data filed at 11/30/2023 0449  Gross per 24 hour   Intake 120 ml   Output 700 ml   Net -580 ml         Physical Exam  Vitals and nursing note reviewed.   Constitutional:       Appearance: She is well-developed.   HENT:      Head: Normocephalic and atraumatic.      Right Ear: External ear normal.      Left Ear: External ear normal.      Nose: Nose normal.   Eyes:      Conjunctiva/sclera: Conjunctivae normal.      Pupils: Pupils are equal, round, and reactive to light.   Cardiovascular:      Rate and Rhythm: Normal rate and regular rhythm.      Heart sounds: Normal heart sounds.   Pulmonary:      Effort: Pulmonary effort is normal.      Breath sounds: Normal breath sounds.      Comments: Decreased entry bases without adventitious sounds    Abdominal:      General: Bowel sounds are normal.      Palpations: Abdomen is soft.    Musculoskeletal:         General: Normal range of motion.      Cervical back: Normal range of motion and neck supple.   Skin:     General: Skin is warm and dry.      Capillary Refill: Capillary refill takes less than 2 seconds.   Neurological:      Mental Status: She is alert and oriented to person, place, and time.   Psychiatric:         Behavior: Behavior normal.         Thought Content: Thought content normal.         Judgment: Judgment normal.             Significant Labs: All pertinent labs within the past 24 hours have been reviewed.  BMP:   Recent Labs   Lab 11/30/23  0454   *      K 3.8      CO2 27   BUN 17   CREATININE 0.6   CALCIUM 7.6*   MG 1.9       CMP:   Recent Labs   Lab 11/29/23  0439 11/30/23  0454    138   K 3.9 3.8    107   CO2 26 27   * 146*   BUN 19 17   CREATININE 0.6 0.6   CALCIUM 7.7* 7.6*   ANIONGAP 5* 4*         Significant Imaging: I have reviewed all pertinent imaging results/findings within the past 24 hours.

## 2023-11-30 NOTE — PLAN OF CARE
11/29/23 2149   Patient Assessment/Suction   Level of Consciousness (AVPU) alert   Respiratory Effort Normal;Unlabored   All Lung Fields Breath Sounds coarse;diminished   PRE-TX-O2   Device (Oxygen Therapy) nasal cannula   Flow (L/min) 4   SpO2 (!) 92 %   Pulse Oximetry Type Intermittent   $ Pulse Oximetry - Multiple Charge Pulse Oximetry - Multiple   Pulse 71   Resp 18   Aerosol Therapy   $ Aerosol Therapy Charges Aerosol Treatment   Daily Review of Necessity (SVN) completed   Respiratory Treatment Status (SVN) given   Treatment Route (SVN) mouthpiece;oxygen   Patient Position (SVN) Rouse's   Post Treatment Assessment (SVN) breath sounds unchanged   Signs of Intolerance (SVN) none   Education   $ Education 15 min;Bronchodilator   Respiratory Evaluation   $ Care Plan Tech Time 15 min

## 2023-12-01 VITALS
SYSTOLIC BLOOD PRESSURE: 125 MMHG | HEART RATE: 81 BPM | HEIGHT: 62 IN | RESPIRATION RATE: 22 BRPM | WEIGHT: 140.44 LBS | OXYGEN SATURATION: 92 % | TEMPERATURE: 98 F | DIASTOLIC BLOOD PRESSURE: 75 MMHG | BODY MASS INDEX: 25.84 KG/M2

## 2023-12-01 LAB
ANION GAP SERPL CALC-SCNC: 9 MMOL/L (ref 8–16)
BACTERIA BLD CULT: NORMAL
BACTERIA BLD CULT: NORMAL
BASOPHILS # BLD AUTO: 0.06 K/UL (ref 0–0.2)
BASOPHILS NFR BLD: 0.4 % (ref 0–1.9)
BNP SERPL-MCNC: 107 PG/ML (ref 0–99)
BUN SERPL-MCNC: 20 MG/DL (ref 8–23)
CALCIUM SERPL-MCNC: 7.8 MG/DL (ref 8.7–10.5)
CHLORIDE SERPL-SCNC: 106 MMOL/L (ref 95–110)
CO2 SERPL-SCNC: 24 MMOL/L (ref 23–29)
CREAT SERPL-MCNC: 0.7 MG/DL (ref 0.5–1.4)
CRP SERPL-MCNC: 5.3 MG/L (ref 0–8.2)
DIFFERENTIAL METHOD: ABNORMAL
EOSINOPHIL # BLD AUTO: 0.2 K/UL (ref 0–0.5)
EOSINOPHIL NFR BLD: 0.9 % (ref 0–8)
ERYTHROCYTE [DISTWIDTH] IN BLOOD BY AUTOMATED COUNT: 14.6 % (ref 11.5–14.5)
EST. GFR  (NO RACE VARIABLE): >60 ML/MIN/1.73 M^2
GLUCOSE SERPL-MCNC: 124 MG/DL (ref 70–110)
HCT VFR BLD AUTO: 41.1 % (ref 37–48.5)
HGB BLD-MCNC: 13 G/DL (ref 12–16)
IMM GRANULOCYTES # BLD AUTO: 0.14 K/UL (ref 0–0.04)
IMM GRANULOCYTES NFR BLD AUTO: 0.9 % (ref 0–0.5)
LYMPHOCYTES # BLD AUTO: 4.5 K/UL (ref 1–4.8)
LYMPHOCYTES NFR BLD: 28.3 % (ref 18–48)
MAGNESIUM SERPL-MCNC: 1.9 MG/DL (ref 1.6–2.6)
MCH RBC QN AUTO: 28.8 PG (ref 27–31)
MCHC RBC AUTO-ENTMCNC: 31.6 G/DL (ref 32–36)
MCV RBC AUTO: 91 FL (ref 82–98)
MONOCYTES # BLD AUTO: 1.5 K/UL (ref 0.3–1)
MONOCYTES NFR BLD: 9.6 % (ref 4–15)
NEUTROPHILS # BLD AUTO: 9.5 K/UL (ref 1.8–7.7)
NEUTROPHILS NFR BLD: 59.9 % (ref 38–73)
NRBC BLD-RTO: 0 /100 WBC
PLATELET # BLD AUTO: 500 K/UL (ref 150–450)
PMV BLD AUTO: 10.2 FL (ref 9.2–12.9)
POTASSIUM SERPL-SCNC: 3.6 MMOL/L (ref 3.5–5.1)
RBC # BLD AUTO: 4.51 M/UL (ref 4–5.4)
SODIUM SERPL-SCNC: 139 MMOL/L (ref 136–145)
WBC # BLD AUTO: 15.83 K/UL (ref 3.9–12.7)

## 2023-12-01 PROCEDURE — 94640 AIRWAY INHALATION TREATMENT: CPT

## 2023-12-01 PROCEDURE — 83880 ASSAY OF NATRIURETIC PEPTIDE: CPT | Performed by: STUDENT IN AN ORGANIZED HEALTH CARE EDUCATION/TRAINING PROGRAM

## 2023-12-01 PROCEDURE — 83735 ASSAY OF MAGNESIUM: CPT | Performed by: INTERNAL MEDICINE

## 2023-12-01 PROCEDURE — 25000242 PHARM REV CODE 250 ALT 637 W/ HCPCS: Performed by: INTERNAL MEDICINE

## 2023-12-01 PROCEDURE — 85025 COMPLETE CBC W/AUTO DIFF WBC: CPT | Performed by: INTERNAL MEDICINE

## 2023-12-01 PROCEDURE — 99900035 HC TECH TIME PER 15 MIN (STAT)

## 2023-12-01 PROCEDURE — 25000003 PHARM REV CODE 250: Performed by: INTERNAL MEDICINE

## 2023-12-01 PROCEDURE — 94761 N-INVAS EAR/PLS OXIMETRY MLT: CPT

## 2023-12-01 PROCEDURE — 27000221 HC OXYGEN, UP TO 24 HOURS

## 2023-12-01 PROCEDURE — 63600175 PHARM REV CODE 636 W HCPCS: Performed by: INTERNAL MEDICINE

## 2023-12-01 PROCEDURE — 99232 SBSQ HOSP IP/OBS MODERATE 35: CPT | Mod: ,,, | Performed by: INTERNAL MEDICINE

## 2023-12-01 PROCEDURE — 99232 PR SUBSEQUENT HOSPITAL CARE,LEVL II: ICD-10-PCS | Mod: ,,, | Performed by: INTERNAL MEDICINE

## 2023-12-01 PROCEDURE — 86140 C-REACTIVE PROTEIN: CPT | Performed by: INTERNAL MEDICINE

## 2023-12-01 PROCEDURE — 36415 COLL VENOUS BLD VENIPUNCTURE: CPT | Performed by: INTERNAL MEDICINE

## 2023-12-01 PROCEDURE — 80048 BASIC METABOLIC PNL TOTAL CA: CPT | Performed by: INTERNAL MEDICINE

## 2023-12-01 RX ORDER — PANTOPRAZOLE SODIUM 40 MG/1
40 TABLET, DELAYED RELEASE ORAL DAILY
Qty: 14 TABLET | Refills: 0 | Status: SHIPPED | OUTPATIENT
Start: 2023-12-01 | End: 2023-12-15

## 2023-12-01 RX ORDER — PREDNISONE 20 MG/1
TABLET ORAL
Qty: 15 TABLET | Refills: 0 | Status: SHIPPED | OUTPATIENT
Start: 2023-12-02 | End: 2023-12-14

## 2023-12-01 RX ORDER — PREDNISONE 20 MG/1
40 TABLET ORAL DAILY
Status: DISCONTINUED | OUTPATIENT
Start: 2023-12-01 | End: 2023-12-01 | Stop reason: HOSPADM

## 2023-12-01 RX ORDER — PREDNISONE 20 MG/1
TABLET ORAL
Qty: 15 TABLET | Refills: 0 | Status: SHIPPED | OUTPATIENT
Start: 2023-12-01 | End: 2023-12-13

## 2023-12-01 RX ORDER — ALBUTEROL SULFATE 90 UG/1
2 AEROSOL, METERED RESPIRATORY (INHALATION) EVERY 4 HOURS PRN
Qty: 6.7 G | Refills: 1 | Status: SHIPPED | OUTPATIENT
Start: 2023-12-01 | End: 2023-12-06

## 2023-12-01 RX ADMIN — METHYLPREDNISOLONE SODIUM SUCCINATE 40 MG: 40 INJECTION, POWDER, FOR SOLUTION INTRAMUSCULAR; INTRAVENOUS at 08:12

## 2023-12-01 RX ADMIN — POTASSIUM BICARBONATE 50 MEQ: 977.5 TABLET, EFFERVESCENT ORAL at 05:12

## 2023-12-01 RX ADMIN — MUPIROCIN 1 G: 20 OINTMENT TOPICAL at 08:12

## 2023-12-01 RX ADMIN — SENNOSIDES AND DOCUSATE SODIUM 1 TABLET: 50; 8.6 TABLET ORAL at 08:12

## 2023-12-01 RX ADMIN — BUDESONIDE INHALATION 0.5 MG: 0.5 SUSPENSION RESPIRATORY (INHALATION) at 07:12

## 2023-12-01 RX ADMIN — LAMOTRIGINE 150 MG: 100 TABLET ORAL at 08:12

## 2023-12-01 RX ADMIN — ESCITALOPRAM OXALATE 20 MG: 10 TABLET ORAL at 06:12

## 2023-12-01 NOTE — NURSING
IV and tele removed. Patient received d/C education verbally as well as written. Belongings gathered and sent with patient. D/C home with family. Transported by staff in wheelchair to personal vehicle.

## 2023-12-01 NOTE — PLAN OF CARE
12/01/23 0742   Patient Assessment/Suction   Level of Consciousness (AVPU) alert   Respiratory Effort Unlabored;Normal   Expansion/Accessory Muscles/Retractions no use of accessory muscles   LLL Breath Sounds diminished   RLL Breath Sounds diminished   Rhythm/Pattern, Respiratory unlabored;pattern regular   Cough Frequency no cough   Skin Integrity   $ Wound Care Tech Time 15 min   Area Observed Left;Right;Behind ear;Nares   Skin Appearance without discoloration   PRE-TX-O2   Device (Oxygen Therapy) nasal cannula   $ Is the patient on Low Flow Oxygen? Yes   Flow (L/min) 3   SpO2 (!) 92 %   Pulse Oximetry Type Intermittent   $ Pulse Oximetry - Multiple Charge Pulse Oximetry - Multiple   Pulse 81   Resp (!) 22   Aerosol Therapy   $ Aerosol Therapy Charges Aerosol Treatment   Daily Review of Necessity (SVN) completed   Respiratory Treatment Status (SVN) given   Treatment Route (SVN) mouthpiece;oxygen   Patient Position (SVN) HOB elevated   Post Treatment Assessment (SVN) breath sounds unchanged   Signs of Intolerance (SVN) none   Breath Sounds Post-Respiratory Treatment   Post-treatment Heart Rate (beats/min) 80   Post-treatment Resp Rate (breaths/min) 16

## 2023-12-01 NOTE — PLAN OF CARE
Pt clear for DC from CM standpoint. Discharging home with [facility] , orders sent, SOC will be tomorrow 12/2.    Hospital follow up on AVS    Pt already has home O2.     12/01/23 1121   Final Note   Assessment Type Final Discharge Note   Anticipated Discharge Disposition Home-Health

## 2023-12-01 NOTE — PT/OT/SLP PROGRESS
Physical Therapy      Patient Name:  Amanda Pereira   MRN:  [id number]    Patient not seen today secondary to Other (Comment) (Pt prepping for discharge.).

## 2023-12-01 NOTE — PROGRESS NOTES
Pulmonary/Critical Care Progress Note      PATIENT NAME: Amanda Pereira  MRN: [id number]  TODAY'S DATE: 2023  5:08 PM  ADMIT DATE: 2023  AGE: 78 y.o. : 1945    CONSULT REQUESTED BY: Boby Daniels MD    REASON FOR CONSULT:   COVID with hypoxic respiratory failure    HPI:  The patient is a 78-year-old female who became positive for COVID on 2023.  She was admitted to the hospital for 3 days and received some Decadron and doxycycline.  She was discharged home on 2 L nasal cannula.  She returned to the hospital on the  with increasing shortness of breath and was found to need 4 L of oxygen.  This time she was given high-dose steroids and antibiotics and remdesivir.  This remdesivir has completed but she is continuing to require 4 L of oxygen.  Her chest CT on admission this time showed increased patchy ground-glass infiltrates.  On physical exam she has posterior crackles.     the patient has no new complaints.  She is excited about going home.  Her oxygen is down to 3 L. I think it is safe for her to go home with a long prednisone taper.  She will follow up in the office in 2 weeks.    REVIEW OF SYSTEMS  GENERAL: Feeling better  EYES: Vision is good.  ENT: No sinusitis or pharyngitis.   HEART: No chest pain or palpitations.  LUNGS: No cough, sputum, or wheezing.  GI: No Nausea, vomiting, constipation, diarrhea, or reflux.  : No dysuria, hesitancy, or nocturia.  SKIN: No lesions or rashes.  MUSCULOSKELETAL: No joint pain or myalgias.  NEURO: No headaches or neuropathy.  LYMPH: No edema or adenopathy.  PSYCH: No anxiety or depression.  ENDO:  No issues.    No change in the patient's Past Medical History, Past Surgical History, Social History or Family History since admission.      VITAL SIGNS (MOST RECENT)  Temp: 97.7 °F (36.5 °C) (23)  Pulse: 86 (23)  Resp: 16 (23)  BP: (!) 123/58 (23)  SpO2: (!) 93 % (23)    INTAKE AND OUTPUT (LAST  24 HOURS):  Intake/Output Summary (Last 24 hours) at 12/1/2023 0641  Last data filed at 12/1/2023 0315  Gross per 24 hour   Intake 1200 ml   Output --   Net 1200 ml       WEIGHT  Wt Readings from Last 1 Encounters:   11/30/23 63.7 kg (140 lb 6.9 oz)       PHYSICAL EXAM  GENERAL: Older patient in no distress.  HEENT: Pupils equal and reactive. Extraocular movements intact. Nose intact. Pharynx moist.  NECK: Supple.   HEART: Regular rate and rhythm. No murmur or gallop auscultated.  LUNGS:  There are crackles throughout the posterior lung fields.  The seemed to be a little less prominent today.  Lung excursion symmetrical. No change in fremitus.   ABDOMEN: Bowel sounds present. Non-tender, no masses palpated.  : Normal anatomy.  EXTREMITIES: Normal muscle tone and joint movement, no cyanosis or clubbing.   LYMPHATICS: No adenopathy palpated, no edema.  SKIN: Dry, intact, no lesions.   NEURO: Cranial nerves II-XII intact. Motor strength 5/5 bilaterally, upper and lower extremities.  PSYCH: Appropriate affect      CBC LAST (LAST 24 HOURS)  Recent Labs   Lab 12/01/23 0429   WBC 15.83*   RBC 4.51   HGB 13.0   HCT 41.1   MCV 91   MCH 28.8   MCHC 31.6*   RDW 14.6*   *   MPV 10.2   GRAN 59.9  9.5*   LYMPH 28.3  4.5   MONO 9.6  1.5*   BASO 0.06   NRBC 0       CHEMISTRY LAST (LAST 24 HOURS)  Recent Labs   Lab 12/01/23 0429      K 3.6      CO2 24   ANIONGAP 9   BUN 20   CREATININE 0.7   *   CALCIUM 7.8*   MG 1.9         CARDIAC PROFILE (LAST 24 HOURS)  Recent Labs   Lab 11/26/23  1154 11/26/23  1324 11/26/23  1742 11/30/23  1455 12/01/23 0429   BNP  --    < >  --    < > 107*   CPK 35  --   --   --   --    TROPONINIHS 7.3   < > 7.4  --   --     < > = values in this interval not displayed.       LAST 7 DAYS MICROBIOLOGY   Microbiology Results (last 7 days)       Procedure Component Value Units Date/Time    MRSA Screen by PCR [[id number]] Collected: 11/30/23 1354    Order Status: Completed  Specimen: Nasopharyngeal Swab from Nasal Updated: 11/30/23 1803     MRSA SCREEN BY PCR Negative    Narrative:      Recoll. [id number] by [facility] at 11/30/2023 13:54, reason: [id number]   THEY USED FOR MRSA Test. Already notified the nurse and asks   for recollect.    Blood culture #1 **CANNOT BE ORDERED STAT** [[id number]] Collected: 11/26/23 1217    Order Status: Completed Specimen: Blood Updated: 11/30/23 1432     Blood Culture, Routine No Growth to date      No Growth to date      No Growth to date      No Growth to date      No Growth to date    Narrative:      Collection has been rescheduled by [facility] at 11/26/2023 12:22 Reason:   Done  Collection has been rescheduled by [facility] at 11/26/2023 12:22 Reason:   Done    Blood Culture #2 **CANNOT BE ORDERED STAT** [[id number]] Collected: 11/26/23 1209    Order Status: Completed Specimen: Blood Updated: 11/30/23 1432     Blood Culture, Routine No Growth to date      No Growth to date      No Growth to date      No Growth to date      No Growth to date    Culture, Respiratory with Gram Stain [[id number]]     Order Status: No result Specimen: Respiratory     Blood culture #1 **CANNOT BE ORDERED STAT** [[id number]]     Order Status: Canceled Specimen: Blood             MOST RECENT IMAGING  X-Ray Chest 1 View  XR CHEST 1 VIEW    CLINICAL HISTORY:  78 years Female covid/hypoxia    COMPARISON: CT chest November 26, 2023    FINDINGS: Cardiac silhouette size is within normal limits for portable technique. Patchy groundglass and interstitial opacities bilaterally, consistent with Covid pneumonia. Blunting of costophrenic angle suggesting trace pleural fluid. No pneumothorax. No acute osseous abnormality.    IMPRESSION:    Patchy groundglass and interstitial opacities bilaterally consistent with Covid pneumonia considering the provided history.    Electronically signed by:  Rohith Millre MD  11/30/2023 01:54 PM CST Workstation: [id number]      CURRENT VISIT EKG  Results for orders  placed or performed during the hospital encounter of 11/26/23   EKG 12-lead    Narrative    Test Reason : R55,    Vent. Rate : 104 BPM     Atrial Rate : 104 BPM     P-R Int : 122 ms          QRS Dur : 078 ms      QT Int : 326 ms       P-R-T Axes : 005 063 057 degrees     QTc Int : 428 ms    Sinus tachycardia  Otherwise normal ECG  When compared with ECG of 20-NOV-2023 08:17,  No significant change was found    Referred By: [email or web address]   SELF           Confirmed By:        ECHOCARDIOGRAM RESULTS  Results for orders placed during the hospital encounter of 11/26/23    Echo Saline Bubble? No    Interpretation Summary    Left Ventricle: The left ventricle is normal in size. Normal wall thickness. Normal wall motion. There is normal systolic function with a visually estimated ejection fraction of 60 - 65%. There is normal diastolic function.    Right Ventricle: Normal right ventricular cavity size. Wall thickness is normal. Right ventricle wall motion  is normal. Systolic function is normal.    IVC/SVC: Normal venous pressure at 3 mmHg.        Oxygen INFORMATION       3 L       LAST ARTERIAL BLOOD GAS  ABG  Recent Labs   Lab 11/26/23  1556   PH 7.421   PO2 57   PCO2 43.7   HCO3 28.4*   BE 4*       IMPRESSION AND PLAN  COVID pneumonia now 13 days out  Acute hypoxic respiratory failure requiring 3 L of oxygen  No predecedent pulmonary pathology    Feel the patient is clinically stable.  She can discharge home on a long prednisone taper and 3-4 L of oxygen.  We will see her in the office in 2 weeks with a new chest x-ray.  She knows to call the office if she is feeling any worse or anymore short of breath in the interim.  I am hopeful that this is just an unusually significant COVID pneumonia for this variant and is not proceeding to pulmonary fibrosis.    Discussed with nursing and patient.    Torrie France MD  Date of Service: 12/01/2023  5:08 PM

## 2023-12-01 NOTE — CARE UPDATE
11/30/23 1952   Patient Assessment/Suction   Level of Consciousness (AVPU) alert   Respiratory Effort Normal;Unlabored   All Lung Fields Breath Sounds Anterior:;Lateral:;diminished   Cough Type no productive sputum   Skin Integrity   $ Wound Care Tech Time 15 min   Area Observed Left;Right;Behind ear;Nares   Skin Appearance without discoloration   PRE-TX-O2   Device (Oxygen Therapy) nasal cannula   $ Is the patient on Low Flow Oxygen? Yes   Flow (L/min) 4   SpO2 95 %   Pulse Oximetry Type Intermittent   $ Pulse Oximetry - Multiple Charge Pulse Oximetry - Multiple   Pulse 84   Resp 18   Aerosol Therapy   $ Aerosol Therapy Charges Aerosol Treatment   Daily Review of Necessity (SVN) completed   Respiratory Treatment Status (SVN) given   Treatment Route (SVN) mask;oxygen   Patient Position (SVN) HOB elevated   Post Treatment Assessment (SVN) breath sounds improved   Signs of Intolerance (SVN) none   Breath Sounds Post-Respiratory Treatment   Throughout All Fields Post-Treatment All Fields   Throughout All Fields Post-Treatment aeration increased   Post-treatment Heart Rate (beats/min) 84   Post-treatment Resp Rate (breaths/min) 18   Respiratory Evaluation   $ Care Plan Tech Time 15 min

## 2023-12-01 NOTE — DISCHARGE SUMMARY
"[facility]  Discharge Summary      Patient Name: Amanda Pereira  MRN: [id number]  ODESSA: [id number]  Patient Class: IP- Inpatient  Admission Date: 11/26/2023  Hospital Length of Stay: 5 days  Discharge Date and Time:  12/01/2023 10:52 AM  Attending Physician: Boby Daniels MD   Discharging Provider: Boby Daniels MD  Primary Care Provider: Kareem Sosa MD    Primary Care Team: Networked reference to record PCT     HPI:   77-year-old female, recently admitted to [facility] for covid with hypoxia, treated with steroids, sent home with 2L oxygen and 3 days of prednisone.  Daughter states patient did very well after discharge initially.  Last prednisone use was Friday.  Saturday night patient became worse with body aches, malaise, chills.  At 3:00 a.m. she had been trying to get out of bed, legs became weak, was found on the ground with head bruise.  Daughter thinks she passed out, she was not very "with it" upon being found.  Hematoma discovered on forehead.  Up until that time she had not been eating very much however this is not unusual for her, daughter states "she eats like a bird."      In the ER patient was clinically dehydrated, dry mouth and lips.  Normotensive but tachycardic.  She was requiring 4 L of oxygen to maintain saturations greater than 90%, compared to the 2 L of oxygen Rx'd upon discharge.      In the ER CT PE study with bilateral small pleural effusions, atelectasis, septal thickening possibly due to pulmonary edema, no PE.  CT head and C-spine without acute changes.     Patient to be admitted for COVID pneumonia with syncope, failed outpatient therapy.       * No surgery found *      Hospital Course:   11/27  Assumed care. Chart reviewed. Labs reviewed and noted below: normal CBC; mild hypokalemia (sliding scale) with normal renal function; cardiac biomarkers are normal, as are TSH and lactate. . Telemetry reviewed: sinus. Feels "Much better" since admission. Requiring 4 " "lpm to achieve 94% sats. Plan: continue current regimen; AM labs for review.    11/28  Resting comfortably in bed. Daughter at bedside. Speaking in sentences. Breathing easily, but requiring 4 lpm to maintain saturations (normally uses 2 lpm at home). No orthopnea. Has dry cough. No CP. Remdesivir was discontinued yesterday (was day 11 after diagnosis). Labs reviewed and noted below: normal CBC, normal electrolytes and renal function. Plan: will continue current regimen and re-evaluate in AM with hopes of discharge on po regimen. Discussed with daughter and patient, both of whom expressed understanding    11/29  Admitted for worsening COVID pneumonia. Remains hypoxic. Not on remdesivir as above. On Solumedrol 40 mg q8h and budesonide neb q12h. Hypoxia not improving. Looks and feels "Good" though. No CP. Does feel SOB(E) after approximately 15-20 feet of walking in room. No orthopnea, PND or edema. CTA on admission: no PE Never smoker.. Labs reviewed and noted below: normal CBC; normal BMP except moderate hyperglycemia (steroids). Plan: Pulmonology opinion; low dose sliding scale; continue current regimen; TSH with AM labs for review; ECHO    Pulmonology consulted.  IV Solu-Medrol changed to prednisone.  Patient remains stable on 3 L nasal cannula.  We will continue with prednisone taper on discharge.  Albuterol p.r.n. given on discharge.  Patient will follow up with pulmonology outpatient.  Patient discharged home on December 1st.     Goals of Care Treatment Preferences:  Code Status: Full Code      Consults:   Consults (From admission, onward)          Status Ordering Provider     Inpatient consult to Pulmonology  Once        Provider:  Juan Dudley MD    Completed ROSAS JOHN            No new Assessment & Plan notes have been filed under this hospital service since the last note was generated.  Service: Hospital Medicine    Final Active Diagnoses:    Diagnosis Date Noted POA    PRINCIPAL PROBLEM:  " Acute hypoxemic respiratory failure [J96.01] 11/20/2023 Yes    COVID-19 [U07.1] 11/20/2023 Yes    Syncope [R55] 11/26/2023 Yes      Problems Resolved During this Admission:    Diagnosis Date Noted Date Resolved POA    Hypoxia [R09.02] 11/26/2023 11/30/2023 Yes       Discharged Condition: good    Disposition: Home or Self Care    Follow Up:   Follow-up Information       Kareem Sosa MD. Go on 12/11/2023.    Specialty: Internal Medicine  Why: Appointment scheduled Monday, 12/11/2023, at 2:15pm.  Contact information:  [address]  [address]  [facility] [address]  [phone]                           Patient Instructions:      Ambulatory referral/consult to Pulmonology   Standing Status: Future   Referral Priority: Routine Referral Type: Consultation   Referral Reason: Specialty Services Required   Referred to Provider: JAMES ALMAGUER Requested Specialty: Pulmonary Disease   Number of Visits Requested: 1       Significant Diagnostic Studies: N/A    Pending Diagnostic Studies:       Procedure Component Value Units Date/Time    C-reactive protein [[id number]] Collected: 11/30/23 0454    Order Status: Sent Lab Status: In process Updated: 11/30/23 0454    Specimen: Blood            Medications:  Reconciled Home Medications:      Medication List        START taking these medications      albuterol 90 mcg/actuation inhaler  Commonly known as: PROVENTIL HFA  Inhale 2 puffs into the lungs every 4 (four) hours as needed for Wheezing or Shortness of Breath. Rescue     pantoprazole 40 MG tablet  Commonly known as: PROTONIX  Take 1 tablet (40 mg total) by mouth once daily. for 14 days            CHANGE how you take these medications      predniSONE 20 MG tablet  Commonly known as: DELTASONE  Take 2 tablets (40 mg total) by mouth once daily for 3 days, THEN 1.5 tablets (30 mg total) once daily for 3 days, THEN 1 tablet (20 mg total) once daily for 3 days, THEN 0.5 tablets (10 mg total) once daily for 3 days.  Start taking  on: December 2, 2023  What changed: See the new instructions.            CONTINUE taking these medications      benzonatate 100 MG capsule  Commonly known as: TESSALON  Take 1 capsule (100 mg total) by mouth 3 (three) times daily as needed for Cough.     clonazePAM 2 MG Tab  Commonly known as: KlonoPIN  Take 1-2 mg by mouth every evening.     doxepin 25 MG capsule  Commonly known as: SINEQUAN  Take 25 mg by mouth every evening.     EScitalopram oxalate 20 MG tablet  Commonly known as: LEXAPRO  Take 20 mg by mouth every morning.     IMODIUM A-D ORAL  Take 2 mg by mouth daily as needed (Diarrhea).     lamoTRIgine 150 MG Tab  Commonly known as: LAMICTAL  Take 150 mg by mouth 2 (two) times daily.     traZODone 150 MG tablet  Commonly known as: DESYREL  Take 150 mg by mouth every evening.            STOP taking these medications      doxycycline 100 MG tablet  Commonly known as: VIBRA-TABS     ondansetron 4 MG Tbdl  Commonly known as: ZOFRAN-ODT              Indwelling Lines/Drains at time of discharge:   Lines/Drains/Airways       None                   Time spent on the discharge of patient: 40 minutes         Boby Daniels MD  Department of Hospital Medicine  [facility]

## 2023-12-01 NOTE — PLAN OF CARE
Problem: Adult Inpatient Plan of Care  Goal: Plan of Care Review  Outcome: Ongoing, Progressing     Problem: Adult Inpatient Plan of Care  Goal: Optimal Comfort and Wellbeing  Outcome: Ongoing, Progressing     Problem: Fall Injury Risk  Goal: Absence of Fall and Fall-Related Injury  Outcome: Ongoing, Progressing     Problem: Gas Exchange Impaired (Pulmonary Impairment)  Goal: Optimal Gas Exchange  Outcome: Ongoing, Progressing

## 2023-12-11 ENCOUNTER — HOSPITAL ENCOUNTER (OUTPATIENT)
Dept: RADIOLOGY | Facility: HOSPITAL | Age: 78
Discharge: HOME | End: 2023-12-11
Attending: INTERNAL MEDICINE
Payer: MEDICARE

## 2023-12-11 ENCOUNTER — OFFICE VISIT (OUTPATIENT)
Dept: PULMONOLOGY | Facility: CLINIC | Age: 78
End: 2023-12-11
Payer: MEDICARE

## 2023-12-11 VITALS
WEIGHT: 141.38 LBS | HEART RATE: 105 BPM | OXYGEN SATURATION: 87 % | SYSTOLIC BLOOD PRESSURE: 100 MMHG | BODY MASS INDEX: 25.86 KG/M2 | DIASTOLIC BLOOD PRESSURE: 64 MMHG

## 2023-12-11 DIAGNOSIS — J96.01 ACUTE RESPIRATORY FAILURE WITH HYPOXIA: ICD-10-CM

## 2023-12-11 DIAGNOSIS — U07.1 COVID-19: Primary | ICD-10-CM

## 2023-12-11 PROCEDURE — 3078F PR MOST RECENT DIASTOLIC BLOOD PRESSURE < 80 MM HG: ICD-10-PCS | Mod: CPTII,S$GLB,, | Performed by: INTERNAL MEDICINE

## 2023-12-11 PROCEDURE — 99214 OFFICE O/P EST MOD 30 MIN: CPT | Mod: S$GLB,,, | Performed by: INTERNAL MEDICINE

## 2023-12-11 PROCEDURE — 3078F DIAST BP <80 MM HG: CPT | Mod: CPTII,S$GLB,, | Performed by: INTERNAL MEDICINE

## 2023-12-11 PROCEDURE — 1159F PR MEDICATION LIST DOCUMENTED IN MEDICAL RECORD: ICD-10-PCS | Mod: CPTII,S$GLB,, | Performed by: INTERNAL MEDICINE

## 2023-12-11 PROCEDURE — 1159F MED LIST DOCD IN RCRD: CPT | Mod: CPTII,S$GLB,, | Performed by: INTERNAL MEDICINE

## 2023-12-11 PROCEDURE — 1126F AMNT PAIN NOTED NONE PRSNT: CPT | Mod: CPTII,S$GLB,, | Performed by: INTERNAL MEDICINE

## 2023-12-11 PROCEDURE — 1111F PR DISCHARGE MEDS RECONCILED W/ CURRENT OUTPATIENT MED LIST: ICD-10-PCS | Mod: CPTII,S$GLB,, | Performed by: INTERNAL MEDICINE

## 2023-12-11 PROCEDURE — 99214 PR OFFICE/OUTPT VISIT, EST, LEVL IV, 30-39 MIN: ICD-10-PCS | Mod: S$GLB,,, | Performed by: INTERNAL MEDICINE

## 2023-12-11 PROCEDURE — 3074F PR MOST RECENT SYSTOLIC BLOOD PRESSURE < 130 MM HG: ICD-10-PCS | Mod: CPTII,S$GLB,, | Performed by: INTERNAL MEDICINE

## 2023-12-11 PROCEDURE — 3074F SYST BP LT 130 MM HG: CPT | Mod: CPTII,S$GLB,, | Performed by: INTERNAL MEDICINE

## 2023-12-11 PROCEDURE — 71046 X-RAY EXAM CHEST 2 VIEWS: CPT | Mod: TC

## 2023-12-11 PROCEDURE — 1111F DSCHRG MED/CURRENT MED MERGE: CPT | Mod: CPTII,S$GLB,, | Performed by: INTERNAL MEDICINE

## 2023-12-11 PROCEDURE — 1126F PR PAIN SEVERITY QUANTIFIED, NO PAIN PRESENT: ICD-10-PCS | Mod: CPTII,S$GLB,, | Performed by: INTERNAL MEDICINE

## 2023-12-11 NOTE — PATIENT INSTRUCTIONS
Follow up in about 2 months (around 2/11/2024).  CXR today,will call with results  CT in 2 months  Buy a pulse ox, keep sats >90%

## 2023-12-11 NOTE — PROGRESS NOTES
SUBJECTIVE:    Patient ID: Amanda Pereira is a 78 y.o. female.    Chief Complaint: Follow-up (Hospital follow up/Patient has SOB)    HPI The patient is here with sats of 87% on room air.  Her oxygen did not come with a regulator. She is now on ours with decent sats on 4 liters.  She has not had her CXR.  She is supposed to be on 3 L. she is feeling better.  She is getting stronger.  She sometimes takes her oxygen off at home.  She does not have a pulse ox.  Advised her to get 1 and make sure her sats are greater than 90 at all times.  Past Medical History:   Diagnosis Date    Depression     Diabetes mellitus     borderline    Kidney stone      Past Surgical History:   Procedure Laterality Date    CYSTOSCOPY N/A 9/21/2022    Procedure: CYSTOSCOPY;  Surgeon: Pepper Salas MD;  Location: [facility] OR;  Service: Urology;  Laterality: N/A;    EXTRACORPOREAL SHOCK WAVE LITHOTRIPSY Left 9/21/2022    Procedure: LITHOTRIPSY, ESWL;  Surgeon: Pepper Salas MD;  Location: [facility] OR;  Service: Urology;  Laterality: Left;    RETROGRADE PYELOGRAPHY Left 9/21/2022    Procedure: PYELOGRAM, RETROGRADE;  Surgeon: Pepper Salas MD;  Location: [facility] OR;  Service: Urology;  Laterality: Left;    TONSILLECTOMY, ADENOIDECTOMY      TUBAL LIGATION      WISDOM TOOTH EXTRACTION       Family History   Problem Relation Age of Onset    Coronary artery disease Father     Urolithiasis Mother     Diabetes Maternal Grandmother     Thyroid disease Sister     Prostate cancer Neg Hx     Kidney cancer Neg Hx         Social History:   Marital Status:   Occupation: Data Unavailable  Alcohol History:  reports no history of alcohol use.  Tobacco History:  reports that she has never smoked. She has never used smokeless tobacco.  Drug History:  reports no history of drug use.    Review of patient's allergies indicates:   Allergen Reactions    Pcn [penicillins] Hives    Promethazine hcl Other (See Comments)     Gets muscle spasams     Reglan [metoclopramide hcl] Other (See Comments)     Gets muscle spasams       Current Outpatient Medications   Medication Sig Dispense Refill    clonazePAM (KLONOPIN) 2 MG Tab Take 1-2 mg by mouth every evening.      doxepin (SINEQUAN) 25 MG capsule Take 25 mg by mouth every evening.      EScitalopram oxalate (LEXAPRO) 20 MG tablet Take 20 mg by mouth every morning.      lamoTRIgine (LAMICTAL) 150 MG Tab Take 150 mg by mouth 2 (two) times daily.      loperamide HCl (IMODIUM A-D ORAL) Take 2 mg by mouth daily as needed (Diarrhea).      traZODone (DESYREL) 150 MG tablet Take 150 mg by mouth every evening.      albuterol (PROVENTIL HFA) 90 mcg/actuation inhaler Inhale 2 puffs into the lungs every 4 (four) hours as needed for Wheezing or Shortness of Breath. Rescue 6.7 g 1    pantoprazole (PROTONIX) 40 MG tablet Take 1 tablet (40 mg total) by mouth once daily. for 14 days (Patient not taking: Reported on 12/11/2023) 14 tablet 0    predniSONE (DELTASONE) 20 MG tablet Take 2 tablets (40 mg total) by mouth once daily for 3 days, THEN 1.5 tablets (30 mg total) once daily for 3 days, THEN 1 tablet (20 mg total) once daily for 3 days, THEN 0.5 tablets (10 mg total) once daily for 3 days. (Patient not taking: Reported on 12/11/2023) 15 tablet 0    predniSONE (DELTASONE) 20 MG tablet Take 2 tablets (40 mg total) by mouth once daily for 3 days, THEN 1.5 tablets (30 mg total) once daily for 3 days, THEN 1 tablet (20 mg total) once daily for 3 days, THEN 0.5 tablets (10 mg total) once daily for 3 days. (Patient not taking: Reported on 12/11/2023) 15 tablet 0     No current facility-administered medications for this visit.       Alpha-1 Antitrypsin:  Last PFT:   Last CT:    Review of Systems  General: Feeling tired.  Eyes: Vision is good.  ENT:  No sinusitis or pharyngitis.   Heart:: No chest pain or palpitations.  Lungs: Dry cough.  GI: No Nausea, vomiting, constipation, diarrhea, or reflux.  : No dysuria, hesitancy, or  nocturia.  Musculoskeletal: No joint pain or myalgias.  Skin: No lesions or rashes.  Neuro: No headaches or neuropathy.  Lymph: No edema or adenopathy.  Psych: No anxiety or depression.  Endo: No weight change.    OBJECTIVE:      /64 (BP Location: Right arm, Patient Position: Sitting, BP Method: Medium (Manual))   Pulse 105   Wt 64.1 kg (141 lb 6.4 oz)   LMP  (LMP Unknown)   SpO2 (!) 87% Comment: With 4 liters of Oxygen 94%  BMI 25.86 kg/m²     Physical Exam  GENERAL: Older patient in no distress.  HEENT: Pupils equal and reactive. Extraocular movements intact. Nose intact. Pharynx moist.  Lips are very dry.  NECK: Supple.   HEART: Regular rate and rhythm. No murmur or gallop auscultated.  LUNGS: Clear to auscultation and percussion. Lung excursion symmetrical. No change in fremitus. No adventitial noises.  ABDOMEN: Bowel sounds present. Non-tender, no masses palpated.  EXTREMITIES: Normal muscle tone and joint movement, no cyanosis or clubbing.   LYMPHATICS: No adenopathy palpated, no edema.  SKIN: Dry, intact, no lesions.   NEURO: Cranial nerves II-XII intact. Motor strength 5/5 bilaterally, upper and lower extremities.  PSYCH: Appropriate affect.    Assessment:       1. COVID-19    2. Acute respiratory failure with hypoxia          Plan:       COVID-19  -     CT Chest Without Contrast; Future; Expected date: 12/11/2023    Acute respiratory failure with hypoxia  -     X-Ray Chest PA And Lateral; Future         Follow up in about 2 months (around 2/11/2024).  CXR today,will call with results  CT in 2 months  Buy a pulse ox, keep sats >90%

## 2023-12-12 ENCOUNTER — TELEPHONE (OUTPATIENT)
Dept: PULMONOLOGY | Facility: CLINIC | Age: 78
End: 2023-12-12

## 2024-02-06 ENCOUNTER — HOSPITAL ENCOUNTER (OUTPATIENT)
Dept: RADIOLOGY | Facility: HOSPITAL | Age: 79
Discharge: HOME | End: 2024-02-06
Attending: INTERNAL MEDICINE
Payer: MEDICARE

## 2024-02-06 DIAGNOSIS — U07.1 COVID-19: ICD-10-CM

## 2024-02-06 PROCEDURE — 71250 CT THORAX DX C-: CPT | Mod: TC,PO

## 2024-02-07 ENCOUNTER — TELEPHONE (OUTPATIENT)
Dept: PULMONOLOGY | Facility: HOSPITAL | Age: 79
End: 2024-02-07

## 2024-02-07 ENCOUNTER — TELEPHONE (OUTPATIENT)
Dept: PULMONOLOGY | Facility: CLINIC | Age: 79
End: 2024-02-07

## 2024-02-07 ENCOUNTER — OFFICE VISIT (OUTPATIENT)
Dept: PULMONOLOGY | Facility: CLINIC | Age: 79
End: 2024-02-07
Payer: MEDICARE

## 2024-02-07 VITALS
WEIGHT: 146.81 LBS | BODY MASS INDEX: 26.85 KG/M2 | SYSTOLIC BLOOD PRESSURE: 110 MMHG | DIASTOLIC BLOOD PRESSURE: 62 MMHG | HEART RATE: 84 BPM | OXYGEN SATURATION: 97 %

## 2024-02-07 DIAGNOSIS — J96.01 ACUTE RESPIRATORY FAILURE WITH HYPOXIA: ICD-10-CM

## 2024-02-07 DIAGNOSIS — J96.11 CHRONIC RESPIRATORY FAILURE WITH HYPOXIA: Primary | ICD-10-CM

## 2024-02-07 DIAGNOSIS — U07.1 COVID-19: ICD-10-CM

## 2024-02-07 PROCEDURE — 3074F SYST BP LT 130 MM HG: CPT | Mod: CPTII,S$GLB,, | Performed by: INTERNAL MEDICINE

## 2024-02-07 PROCEDURE — 99214 OFFICE O/P EST MOD 30 MIN: CPT | Mod: S$GLB,,, | Performed by: INTERNAL MEDICINE

## 2024-02-07 PROCEDURE — 1126F AMNT PAIN NOTED NONE PRSNT: CPT | Mod: CPTII,S$GLB,, | Performed by: INTERNAL MEDICINE

## 2024-02-07 PROCEDURE — 1159F MED LIST DOCD IN RCRD: CPT | Mod: CPTII,S$GLB,, | Performed by: INTERNAL MEDICINE

## 2024-02-07 PROCEDURE — 3078F DIAST BP <80 MM HG: CPT | Mod: CPTII,S$GLB,, | Performed by: INTERNAL MEDICINE

## 2024-02-07 NOTE — PROGRESS NOTES
SUBJECTIVE:    Patient ID: Amanda Pereira is a 78 y.o. female.    Chief Complaint: Follow-up (2 month follow up/Patient congested)    HPI The patient iis here on 4 liters of oxygen continuous. She is feeling fair.  Her CT showed that her pleural effusions and ground-glass infiltrates have cleared.  Unfortunately there is a new 12 x 13 mm nodule on the major fissure on the right.  Will need to follow that up.  The patient is having issues with her nose running.  Her home concentrator does not have a humidifier on it.  Past Medical History:   Diagnosis Date    Depression     Diabetes mellitus     borderline    Kidney stone      Past Surgical History:   Procedure Laterality Date    CYSTOSCOPY N/A 9/21/2022    Procedure: CYSTOSCOPY;  Surgeon: Pepper Salas MD;  Location: [facility] OR;  Service: Urology;  Laterality: N/A;    EXTRACORPOREAL SHOCK WAVE LITHOTRIPSY Left 9/21/2022    Procedure: LITHOTRIPSY, ESWL;  Surgeon: Pepper Salas MD;  Location: [facility] OR;  Service: Urology;  Laterality: Left;    RETROGRADE PYELOGRAPHY Left 9/21/2022    Procedure: PYELOGRAM, RETROGRADE;  Surgeon: Pepper Salas MD;  Location: [facility] OR;  Service: Urology;  Laterality: Left;    TONSILLECTOMY, ADENOIDECTOMY      TUBAL LIGATION      WISDOM TOOTH EXTRACTION       Family History   Problem Relation Age of Onset    Coronary artery disease Father     Urolithiasis Mother     Diabetes Maternal Grandmother     Thyroid disease Sister     Prostate cancer Neg Hx     Kidney cancer Neg Hx         Social History:   Marital Status:   Occupation: Data Unavailable  Alcohol History:  reports no history of alcohol use.  Tobacco History:  reports that she has never smoked. She has never used smokeless tobacco.  Drug History:  reports no history of drug use.    Review of patient's allergies indicates:   Allergen Reactions    Pcn [penicillins] Hives    Promethazine hcl Other (See Comments)     Gets muscle spasams    Reglan  [metoclopramide hcl] Other (See Comments)     Gets muscle spasams       Current Outpatient Medications   Medication Sig Dispense Refill    clonazePAM (KLONOPIN) 2 MG Tab Take 1-2 mg by mouth every evening.      doxepin (SINEQUAN) 25 MG capsule Take 25 mg by mouth every evening.      EScitalopram oxalate (LEXAPRO) 20 MG tablet Take 20 mg by mouth every morning.      lamoTRIgine (LAMICTAL) 150 MG Tab Take 150 mg by mouth 2 (two) times daily.      loperamide HCl (IMODIUM A-D ORAL) Take 2 mg by mouth daily as needed (Diarrhea).      traZODone (DESYREL) 150 MG tablet Take 150 mg by mouth every evening.      albuterol (PROVENTIL HFA) 90 mcg/actuation inhaler Inhale 2 puffs into the lungs every 4 (four) hours as needed for Wheezing or Shortness of Breath. Rescue 6.7 g 1    pantoprazole (PROTONIX) 40 MG tablet Take 1 tablet (40 mg total) by mouth once daily. for 14 days (Patient not taking: Reported on 12/11/2023) 14 tablet 0     No current facility-administered medications for this visit.       Alpha-1 Antitrypsin:  Last PFT:   Last CT:    Review of Systems  General: Feeling fairly well.  Eyes: Vision is ok unrtil late in the afternoon.  ENT:  Rhinitis from her oxygen  Heart:: No chest pain or palpitations.  Lungs: Cough has resolved.  GI: No Nausea, vomiting, constipation, diarrhea, or reflux.  : No dysuria, hesitancy, or nocturia.  Musculoskeletal: No joint pain or myalgias.  Skin: No lesions or rashes.  Neuro: No headaches or neuropathy.  Lymph: No edema or adenopathy.  Psych: No anxiety or depression.  Endo: No weight change.    OBJECTIVE:      /62 (BP Location: Right arm, Patient Position: Sitting, BP Method: Medium (Manual))   Pulse 84   Wt 66.6 kg (146 lb 12.8 oz)   LMP  (LMP Unknown)   SpO2 97% Comment: On 4.5 liters of O2  BMI 26.85 kg/m²     Physical Exam  GENERAL: Older patient in no distress.  HEENT: Pupils equal and reactive. Extraocular movements intact. Nose intact. Pharynx moist.  Lips are very  dry.  NECK: Supple.   HEART: Regular rate and rhythm. No murmur or gallop auscultated.  LUNGS: Clear to auscultation and percussion. Lung excursion symmetrical. No change in fremitus. No adventitial noises.  ABDOMEN: Bowel sounds present. Non-tender, no masses palpated.  EXTREMITIES: Normal muscle tone and joint movement, no cyanosis or clubbing.   LYMPHATICS: No adenopathy palpated, no edema.  SKIN: Dry, intact, no lesions.   NEURO: Cranial nerves II-XII intact. Motor strength 5/5 bilaterally, upper and lower extremities.  PSYCH: Appropriate affect.    Assessment:       1. Chronic respiratory failure with hypoxia    2. COVID-19    3. Acute respiratory failure with hypoxia            Plan:       Chronic respiratory failure with hypoxia    COVID-19  Comments:  As the cause of her hypoxemia.    Acute respiratory failure with hypoxia  -     Complete PFT with bronchodilator; Future  -     Stress test, pulmonary; Future; Expected date: 02/07/2024           Follow up in about 3 months (around 5/7/2024).  Pulmonary function studies  6 minute walk to see if she still needs 4 L of oxygen or if we can start cutting this down  Humidify home concentrator ,distilled water only

## 2024-02-07 NOTE — TELEPHONE ENCOUNTER
Ct chest  IMPRESSION: Resolution of the small bilateral pleural effusions as well as the faint groundglass opacities and interlobular septal thickening     New 13 x 12 mm pleural-based alveolar opacity in the lateral right middle lobe along the major fissure. Findings most likely represent infectious or inflammatory process. Short-term follow-up CT in 3 months is recommended

## 2024-02-07 NOTE — TELEPHONE ENCOUNTER
The patient's CT shows resolution of the previous nodules and infiltrates but a new 13 x 12 mm nodule in the right middle lobe.  Will need to repeat the CT in 3 months.

## 2024-02-12 RX ORDER — PANTOPRAZOLE SODIUM 40 MG/1
TABLET, DELAYED RELEASE ORAL
Qty: 14 TABLET | Refills: 0 | OUTPATIENT
Start: 2024-02-12

## 2024-02-26 ENCOUNTER — PATIENT MESSAGE (OUTPATIENT)
Dept: PULMONOLOGY | Facility: CLINIC | Age: 79
End: 2024-02-26

## 2024-02-26 ENCOUNTER — TELEPHONE (OUTPATIENT)
Dept: PULMONOLOGY | Facility: CLINIC | Age: 79
End: 2024-02-26

## 2024-02-26 NOTE — TELEPHONE ENCOUNTER
Spoke with the patient's daughter.  She is going to finally get her humidifier for her oxygen tomorrow.  If the cough does not settle and a couple of days will bring her into the office and see what is going on.

## 2024-03-11 PROBLEM — J96.01 ACUTE RESPIRATORY FAILURE WITH HYPOXIA: Status: RESOLVED | Noted: 2023-11-20 | Resolved: 2024-03-11

## 2024-05-01 ENCOUNTER — HOSPITAL ENCOUNTER (OUTPATIENT)
Dept: PULMONOLOGY | Facility: HOSPITAL | Age: 79
Discharge: HOME | End: 2024-05-01
Attending: INTERNAL MEDICINE
Payer: MEDICARE

## 2024-05-01 ENCOUNTER — TELEPHONE (OUTPATIENT)
Dept: PULMONOLOGY | Facility: CLINIC | Age: 79
End: 2024-05-01

## 2024-05-01 VITALS — WEIGHT: 146 LBS | HEIGHT: 62 IN | BODY MASS INDEX: 26.87 KG/M2

## 2024-05-01 DIAGNOSIS — J96.01 ACUTE RESPIRATORY FAILURE WITH HYPOXIA: ICD-10-CM

## 2024-05-01 PROCEDURE — 94010 BREATHING CAPACITY TEST: CPT

## 2024-05-01 PROCEDURE — 94727 GAS DIL/WSHOT DETER LNG VOL: CPT

## 2024-05-01 PROCEDURE — 94618 PULMONARY STRESS TESTING: CPT

## 2024-05-01 PROCEDURE — 94729 DIFFUSING CAPACITY: CPT

## 2024-05-01 NOTE — CARE UPDATE
"   05/01/24 1316   6MW Test   Ordering Provider Torrie France MD   Diagnosis Shortness of Breath;Qualify for Oxygen   Height 5' 2" (1.575 m)   Weight 66.2 kg (146 lb)   BMI (Calculated) 26.7   Predicted Distance Meters (Calculated) 402.61 meters   Patient Race    6MWT Status completed without stopping   Patient Reported No complaints   Was O2 used? Yes   Delivery Method Cannula;Pull Tank;Continuous Flow   6MW Distance walked (feet) 1300 feet   Distance walked (meters) 396.24 meters   Did patient stop? No   Type of assistive device(s) used? no assistive devices   Is extra documentation required for this patient? Yes   Pre-Exercise   Oxygen Saturation 92 %   Supplemental Oxygen Room Air   Heart Rate 86 bpm   Justin Dyspnea Rating  very light   Exercise 1 Minute   Oxygen Saturation 87 %   Supplemental Oxygen Room Air   Heart Rate 91 bpm   Exercise 2 Minutes   Oxygen Saturation 90 %   Supplemental Oxygen 2 L/M   Heart Rate 101 bpm   Exercise 3 Minutes   Oxygen Saturation 93 %   Supplemental Oxygen 2 L/M   Heart Rate 118 bpm   Exercise 4 Minutes   Oxygen Saturation 92 %   Supplemental Oxygen 2 L/M   Heart Rate 110 bpm   Exercise 5 Minutes   Oxygen Saturation 96 %   Supplemental Oxygen 2 L/M   Heart Rate 113 bpm   Post Exercise   Oxygen Saturation 95 %   Supplemental Oxygen 2 L/M   Heart Rate 101 bpm   Justin Dyspnea Rating  moderate   Recovery   Oxygen Saturation 96 %   Supplemental Oxygen 2 L/M   Heart Rate 100 bpm   Justin Dyspnea Rating  moderate   Interpretation   Is procedure ready for interpretation? Yes   Did the patient stop or pause? No   Total Laps Walked 6.5   Final Partial Lap Distance (feet) 0 feet   Total Distance Feet (Calculated) 1300 feet   Total Distance Meters (Calculated) 396.24 meters   Percentage of Predicted (Calculated) 98.42   Peak VO2 (Calculated) 15.87   Mets 4.53   Comments This is a hypoxemic 6 min. walk.   Patient requires 2 liters of oxygen to adequately oxygenate with ambulation. "   Oxygen Qualification   Oxygen Qualification? Yes

## 2024-05-08 ENCOUNTER — OFFICE VISIT (OUTPATIENT)
Dept: PULMONOLOGY | Facility: CLINIC | Age: 79
End: 2024-05-08
Payer: MEDICARE

## 2024-05-08 VITALS
SYSTOLIC BLOOD PRESSURE: 118 MMHG | BODY MASS INDEX: 26.7 KG/M2 | WEIGHT: 146 LBS | HEART RATE: 85 BPM | OXYGEN SATURATION: 94 % | DIASTOLIC BLOOD PRESSURE: 68 MMHG

## 2024-05-08 DIAGNOSIS — J96.11 CHRONIC RESPIRATORY FAILURE WITH HYPOXIA: Primary | ICD-10-CM

## 2024-05-08 DIAGNOSIS — R91.1 PULMONARY NODULE 1 CM OR GREATER IN DIAMETER: ICD-10-CM

## 2024-05-08 DIAGNOSIS — J84.10 PULMONARY FIBROSIS DETERMINED BY HIGH RESOLUTION COMPUTED TOMOGRAPHY: ICD-10-CM

## 2024-05-08 PROCEDURE — 1159F MED LIST DOCD IN RCRD: CPT | Mod: CPTII,S$GLB,, | Performed by: INTERNAL MEDICINE

## 2024-05-08 PROCEDURE — 99214 OFFICE O/P EST MOD 30 MIN: CPT | Mod: S$GLB,,, | Performed by: INTERNAL MEDICINE

## 2024-05-08 PROCEDURE — 3078F DIAST BP <80 MM HG: CPT | Mod: CPTII,S$GLB,, | Performed by: INTERNAL MEDICINE

## 2024-05-08 PROCEDURE — 3074F SYST BP LT 130 MM HG: CPT | Mod: CPTII,S$GLB,, | Performed by: INTERNAL MEDICINE

## 2024-05-08 PROCEDURE — 1126F AMNT PAIN NOTED NONE PRSNT: CPT | Mod: CPTII,S$GLB,, | Performed by: INTERNAL MEDICINE

## 2024-05-21 ENCOUNTER — HOSPITAL ENCOUNTER (OUTPATIENT)
Dept: RADIOLOGY | Facility: HOSPITAL | Age: 79
Discharge: HOME | End: 2024-05-21
Attending: INTERNAL MEDICINE
Payer: MEDICARE

## 2024-05-21 ENCOUNTER — TELEPHONE (OUTPATIENT)
Dept: PULMONOLOGY | Facility: CLINIC | Age: 79
End: 2024-05-21

## 2024-05-21 DIAGNOSIS — R91.1 PULMONARY NODULE 1 CM OR GREATER IN DIAMETER: ICD-10-CM

## 2024-05-21 PROCEDURE — 71250 CT THORAX DX C-: CPT | Mod: 26,,, | Performed by: RADIOLOGY

## 2024-05-21 PROCEDURE — 71250 CT THORAX DX C-: CPT | Mod: TC,PO

## 2024-07-13 ENCOUNTER — HOSPITAL ENCOUNTER (EMERGENCY)
Facility: HOSPITAL | Age: 79
Discharge: HOME | End: 2024-07-13
Attending: EMERGENCY MEDICINE
Payer: MEDICARE

## 2024-07-13 VITALS
TEMPERATURE: 98 F | BODY MASS INDEX: 27.18 KG/M2 | SYSTOLIC BLOOD PRESSURE: 124 MMHG | HEIGHT: 62 IN | OXYGEN SATURATION: 95 % | WEIGHT: 147.69 LBS | HEART RATE: 70 BPM | DIASTOLIC BLOOD PRESSURE: 62 MMHG | RESPIRATION RATE: 16 BRPM

## 2024-07-13 DIAGNOSIS — R42 DIZZINESS: ICD-10-CM

## 2024-07-13 DIAGNOSIS — R53.1 WEAKNESS: ICD-10-CM

## 2024-07-13 DIAGNOSIS — J96.11 CHRONIC RESPIRATORY FAILURE WITH HYPOXIA: Primary | ICD-10-CM

## 2024-07-13 LAB
ALBUMIN SERPL BCP-MCNC: 4 G/DL (ref 3.5–5.2)
ALP SERPL-CCNC: 165 U/L (ref 55–135)
ALT SERPL W/O P-5'-P-CCNC: 8 U/L (ref 10–44)
ANION GAP SERPL CALC-SCNC: 9 MMOL/L (ref 8–16)
AST SERPL-CCNC: 19 U/L (ref 10–40)
BASOPHILS # BLD AUTO: 0.12 K/UL (ref 0–0.2)
BASOPHILS NFR BLD: 0.7 % (ref 0–1.9)
BILIRUB SERPL-MCNC: 0.5 MG/DL (ref 0.1–1)
BNP SERPL-MCNC: 17 PG/ML (ref 0–99)
BUN SERPL-MCNC: 14 MG/DL (ref 8–23)
CALCIUM SERPL-MCNC: 9.2 MG/DL (ref 8.7–10.5)
CHLORIDE SERPL-SCNC: 102 MMOL/L (ref 95–110)
CO2 SERPL-SCNC: 27 MMOL/L (ref 23–29)
CREAT SERPL-MCNC: 0.8 MG/DL (ref 0.5–1.4)
DIFFERENTIAL METHOD BLD: ABNORMAL
EOSINOPHIL # BLD AUTO: 0.1 K/UL (ref 0–0.5)
EOSINOPHIL NFR BLD: 0.5 % (ref 0–8)
ERYTHROCYTE [DISTWIDTH] IN BLOOD BY AUTOMATED COUNT: 14.5 % (ref 11.5–14.5)
EST. GFR  (NO RACE VARIABLE): >60 ML/MIN/1.73 M^2
GLUCOSE SERPL-MCNC: 119 MG/DL (ref 70–110)
HCT VFR BLD AUTO: 43.7 % (ref 37–48.5)
HGB BLD-MCNC: 13.4 G/DL (ref 12–16)
IMM GRANULOCYTES # BLD AUTO: 0.09 K/UL (ref 0–0.04)
IMM GRANULOCYTES NFR BLD AUTO: 0.5 % (ref 0–0.5)
INR PPP: 0.9 (ref 0.8–1.2)
LYMPHOCYTES # BLD AUTO: 1.6 K/UL (ref 1–4.8)
LYMPHOCYTES NFR BLD: 9.3 % (ref 18–48)
MAGNESIUM SERPL-MCNC: 1.9 MG/DL (ref 1.6–2.6)
MCH RBC QN AUTO: 28.2 PG (ref 27–31)
MCHC RBC AUTO-ENTMCNC: 30.7 G/DL (ref 32–36)
MCV RBC AUTO: 92 FL (ref 82–98)
MONOCYTES # BLD AUTO: 0.9 K/UL (ref 0.3–1)
MONOCYTES NFR BLD: 5.3 % (ref 4–15)
NEUTROPHILS # BLD AUTO: 14.3 K/UL (ref 1.8–7.7)
NEUTROPHILS NFR BLD: 83.7 % (ref 38–73)
NRBC BLD-RTO: 0 /100 WBC
PLATELET # BLD AUTO: 325 K/UL (ref 150–450)
PMV BLD AUTO: 10 FL (ref 9.2–12.9)
POTASSIUM SERPL-SCNC: 3.9 MMOL/L (ref 3.5–5.1)
PROT SERPL-MCNC: 7.4 G/DL (ref 6–8.4)
PROTHROMBIN TIME: 10.2 SEC (ref 9–12.5)
RBC # BLD AUTO: 4.76 M/UL (ref 4–5.4)
SODIUM SERPL-SCNC: 138 MMOL/L (ref 136–145)
TROPONIN I SERPL HS-MCNC: 5.6 PG/ML (ref 0–14.9)
WBC # BLD AUTO: 17.05 K/UL (ref 3.9–12.7)

## 2024-07-13 PROCEDURE — 83880 ASSAY OF NATRIURETIC PEPTIDE: CPT

## 2024-07-13 PROCEDURE — 84484 ASSAY OF TROPONIN QUANT: CPT

## 2024-07-13 PROCEDURE — 80053 COMPREHEN METABOLIC PANEL: CPT

## 2024-07-13 PROCEDURE — 83735 ASSAY OF MAGNESIUM: CPT

## 2024-07-13 PROCEDURE — 85610 PROTHROMBIN TIME: CPT

## 2024-07-13 PROCEDURE — 85025 COMPLETE CBC W/AUTO DIFF WBC: CPT

## 2024-07-13 PROCEDURE — 99284 EMERGENCY DEPT VISIT MOD MDM: CPT

## 2024-07-14 LAB
OHS QRS DURATION: 78 MS
OHS QTC CALCULATION: 410 MS

## 2024-07-14 NOTE — ED PROVIDER NOTES
Encounter Date: 7/13/2024       History     Chief Complaint   Patient presents with    Dizziness     States while sitting on the side of the bed she was dizzy. EMS noted she was off of her O2.     Emergent vianeyal a 78-year-old female with history of diabetes, GERD, renal stones, and post COVID need for chronic O2.  Patient reports that today she had to go outside to check on her vehicle and had to take off her oxygen.  She reports she was off of oxygen for proximally 30 minutes.  She reports after that period of time she became lightheaded and dizzy      Review of patient's allergies indicates:   Allergen Reactions    Pcn [penicillins] Hives    Promethazine hcl Other (See Comments)     Gets muscle spasams    Reglan [metoclopramide hcl] Other (See Comments)     Gets muscle spasams     Past Medical History:   Diagnosis Date    Depression     Diabetes mellitus     borderline    Kidney stone      Past Surgical History:   Procedure Laterality Date    CYSTOSCOPY N/A 9/21/2022    Procedure: CYSTOSCOPY;  Surgeon: Pepper Salas MD;  Location: [facility] OR;  Service: Urology;  Laterality: N/A;    EXTRACORPOREAL SHOCK WAVE LITHOTRIPSY Left 9/21/2022    Procedure: LITHOTRIPSY, ESWL;  Surgeon: Pepper Salas MD;  Location: [facility] OR;  Service: Urology;  Laterality: Left;    RETROGRADE PYELOGRAPHY Left 9/21/2022    Procedure: PYELOGRAM, RETROGRADE;  Surgeon: Pepper Salas MD;  Location: [facility] OR;  Service: Urology;  Laterality: Left;    TONSILLECTOMY, ADENOIDECTOMY      TUBAL LIGATION      WISDOM TOOTH EXTRACTION       Family History   Problem Relation Name Age of Onset    Coronary artery disease Father      Urolithiasis Mother      Diabetes Maternal Grandmother      Thyroid disease Sister      Prostate cancer Neg Hx      Kidney cancer Neg Hx       Social History     Tobacco Use    Smoking status: Never    Smokeless tobacco: Never   Substance Use Topics    Alcohol use: No    Drug use: No     Review of  Systems   Constitutional:  Negative for activity change, appetite change, chills, diaphoresis, fatigue and fever.   HENT:  Negative for congestion, postnasal drip, rhinorrhea and sore throat.    Respiratory:  Positive for shortness of breath. Negative for cough, chest tightness, wheezing and stridor.    Cardiovascular:  Negative for chest pain and palpitations.   Gastrointestinal:  Negative for abdominal pain, constipation, diarrhea, nausea and vomiting.   Musculoskeletal:  Negative for arthralgias, back pain, myalgias and neck pain.   Neurological:  Positive for dizziness and light-headedness. Negative for syncope, weakness and headaches.   Hematological:  Does not bruise/bleed easily.   Psychiatric/Behavioral:  Negative for confusion. The patient is not nervous/anxious.    All other systems reviewed and are negative.      Physical Exam     Initial Vitals [07/13/24 1855]   BP Pulse Resp Temp SpO2   (!) 113/49 79 20 98 °F (36.7 °C) 99 %      MAP       --         Physical Exam    Nursing note and vitals reviewed.  Constitutional: She appears well-developed and well-nourished. She is not diaphoretic. No distress.   HENT:   Head: Normocephalic and atraumatic.   Right Ear: External ear normal.   Left Ear: External ear normal.   Nose: Nose normal.   Mouth/Throat: Oropharynx is clear and moist.   Eyes: Conjunctivae and EOM are normal. Pupils are equal, round, and reactive to light.   Neck: Neck supple. No tracheal deviation present.   Normal range of motion.  Cardiovascular:  Normal rate, regular rhythm, normal heart sounds and intact distal pulses.     Exam reveals no gallop and no friction rub.       No murmur heard.  113/49 pulse 79   Pulmonary/Chest: Breath sounds normal. No stridor. No respiratory distress. She has no wheezes. She has no rhonchi. She has no rales. She exhibits no tenderness.   99% on 2 L respirations 16   Abdominal: Abdomen is soft. Bowel sounds are normal. She exhibits no distension and no mass.  There is no abdominal tenderness. There is no rebound and no guarding.   Musculoskeletal:         General: No edema. Normal range of motion.      Cervical back: Normal range of motion and neck supple.     Neurological: She is alert and oriented to person, place, and time. She has normal strength. No cranial nerve deficit or sensory deficit.   Skin: Skin is warm and dry. No rash noted. No erythema. No pallor.   Psychiatric: She has a normal mood and affect. Her behavior is normal. Judgment and thought content normal.         ED Course   Procedures  Labs Reviewed   CBC W/ AUTO DIFFERENTIAL - Abnormal; Notable for the following components:       Result Value    WBC 17.05 (*)     MCHC 30.7 (*)     Gran # (ANC) 14.3 (*)     Immature Grans (Abs) 0.09 (*)     Gran % 83.7 (*)     Lymph % 9.3 (*)     All other components within normal limits   COMPREHENSIVE METABOLIC PANEL - Abnormal; Notable for the following components:    Glucose 119 (*)     Alkaline Phosphatase 165 (*)     ALT 8 (*)     All other components within normal limits   B-TYPE NATRIURETIC PEPTIDE   MAGNESIUM   TROPONIN I HIGH SENSITIVITY   PROTIME-INR     EKG Readings: (Independently Interpreted)   Nsr 60 with PACs no conduction delay inverted T-waves V1 V2    T-wave inversions are new     ECG Results              EKG 12-lead (In process)        Collection Time Result Time QRS Duration OHS QTC Calculation    07/13/24 19:37:29 07/14/24 05:15:20 78 410                     In process by Interface, Lab In [facility] (07/14/24 05:15:28)                   Narrative:    Test Reason : R07.9,    Vent. Rate : 060 BPM     Atrial Rate : 060 BPM     P-R Int : 132 ms          QRS Dur : 078 ms      QT Int : 410 ms       P-R-T Axes : 049 024 070 degrees     QTc Int : 410 ms    Sinus rhythm with Premature atrial complexes  Cannot rule out Anterior infarct ,age undetermined  Abnormal ECG  No previous ECGs available    Referred By: [email or web address]   SELF           Confirmed By:                                    Imaging Results    None          Medications - No data to display  Medical Decision Making  Emergent eval a 78-year-old female with history of diabetes, GERD, renal stones, and post COVID need for chronic O2.  Patient reports that today she had to go outside to check on her vehicle and had to take off her oxygen.  She reports she was off of oxygen for proximally 30 minutes.  She reports after that period of time she became lightheaded and dizzy  On physical exam patient was awake alert oriented sats are 99% on 2 L respirations 16 clear breath sounds blood pressure 113/49 pulse 79 temperature was 98° patient was in no distress she reports that she just got confused and lightheaded when she was off of her oxygen.  No fall and no injury  MDM    Patient presents for emergent evaluation of acute patient was off of her home oxygen for 30 minutes while outside became dizzy lightheaded and short of breath that poses a threat to life and/or bodily function.   Differential diagnosis includes but was not limited to chronic respiratory failure with hypoxia, hypercapnia, PE, ACS, cardiac dysrhythmia, pneumonia, bronchitis, electrolyte abnormalities  In the ED patient found to have acute chronic respiratory failure with hypoxia dizziness and weakness  I ordered labs and personally reviewed them.  Labs significant for see below  I ordered EKG and personally reviewed it.  EKG significant for see above    Discharge MDM     Patient was managed in the ED with no meds here patient was placed back on her oxygen.  When discharged home will ensure her family had her oxygen available for transport   The response to treatment was good.    Patient was discharged in stable condition.  Detailed return precautions discussed.  Patient was told to follow up with primary care physician or specialist based on their diagnosis  Riddhi Howard MD      Amount and/or Complexity of Data Reviewed  External Data Reviewed:  ECG.  Labs: ordered. Decision-making details documented in ED Course.  ECG/medicine tests: ordered and independent interpretation performed.    Chronic respiratory failure with hypoxia, dizziness, weakness           ED Course as of 07/17/24 0222   Sat Jul 13, 2024   2150 White count 17.05 otherwise normal CBC Mag 1.9 troponin 5.6 normal CMP other than alk-phos of 165 normal BNP of 17 normal coags [RM]      ED Course User Index  [RM] Riddhi Howard MD                           Clinical Impression:  Final diagnoses:  [R42] Dizziness  [R53.1] Weakness  [J96.11] Chronic respiratory failure with hypoxia (Primary)          ED Disposition Condition    Discharge Stable          ED Prescriptions    None       Follow-up Information       Follow up With Specialties Details Why Contact Info Additional Information    Torrie France MD Pulmonary Disease, Sleep Medicine Schedule an appointment as soon as possible for a visit  If your symptoms do not improve [address]  [address]  [place] [address]  [phone]       [facility] - Emergency Dept Emergency Medicine Go to  If symptoms worsen [address] [address]  [phone] 1st floor             Riddhi Howard MD  07/14/24 0647       Riddhi Howard MD  07/17/24 0222

## 2024-08-07 ENCOUNTER — OFFICE VISIT (OUTPATIENT)
Dept: PULMONOLOGY | Facility: CLINIC | Age: 79
End: 2024-08-07
Payer: MEDICARE

## 2024-08-07 VITALS
HEART RATE: 104 BPM | SYSTOLIC BLOOD PRESSURE: 120 MMHG | HEIGHT: 62 IN | DIASTOLIC BLOOD PRESSURE: 70 MMHG | OXYGEN SATURATION: 94 % | BODY MASS INDEX: 27.42 KG/M2 | WEIGHT: 149 LBS

## 2024-08-07 DIAGNOSIS — J96.11 CHRONIC RESPIRATORY FAILURE WITH HYPOXIA: ICD-10-CM

## 2024-08-07 DIAGNOSIS — J84.10 PULMONARY FIBROSIS DETERMINED BY HIGH RESOLUTION COMPUTED TOMOGRAPHY: ICD-10-CM

## 2024-08-07 DIAGNOSIS — R91.1 PULMONARY NODULE 1 CM OR GREATER IN DIAMETER: Primary | ICD-10-CM

## 2024-08-07 PROCEDURE — 99213 OFFICE O/P EST LOW 20 MIN: CPT | Mod: S$GLB,,, | Performed by: INTERNAL MEDICINE

## 2024-08-07 PROCEDURE — 1159F MED LIST DOCD IN RCRD: CPT | Mod: CPTII,S$GLB,, | Performed by: INTERNAL MEDICINE

## 2024-08-07 PROCEDURE — 3074F SYST BP LT 130 MM HG: CPT | Mod: CPTII,S$GLB,, | Performed by: INTERNAL MEDICINE

## 2024-08-07 PROCEDURE — 1126F AMNT PAIN NOTED NONE PRSNT: CPT | Mod: CPTII,S$GLB,, | Performed by: INTERNAL MEDICINE

## 2024-08-07 PROCEDURE — 99999 PR PBB SHADOW E&M-EST. PATIENT-LVL III: CPT | Mod: PBBFAC,,, | Performed by: INTERNAL MEDICINE

## 2024-08-07 PROCEDURE — 3078F DIAST BP <80 MM HG: CPT | Mod: CPTII,S$GLB,, | Performed by: INTERNAL MEDICINE

## 2024-08-08 ENCOUNTER — TELEPHONE (OUTPATIENT)
Dept: PULMONOLOGY | Facility: CLINIC | Age: 79
End: 2024-08-08
Payer: MEDICARE

## 2024-09-18 ENCOUNTER — TELEPHONE (OUTPATIENT)
Dept: PULMONOLOGY | Facility: CLINIC | Age: 79
End: 2024-09-18
Payer: MEDICARE

## 2024-09-18 NOTE — TELEPHONE ENCOUNTER
----- Message from Noemy Smith MA sent at 9/17/2024  4:01 PM CDT -----  Regarding: FW: requesting appt    ----- Message -----  From: Silvia Orellana  Sent: 9/17/2024   3:53 PM CDT  To: [facility] Staff  Subject: requesting appt                                  Pt has an appt in November but her daughter called to see if she can get seen sonner. Pt is having troubles breathing and sleeping.  Callback # [phone]

## 2024-11-13 ENCOUNTER — TELEPHONE (OUTPATIENT)
Dept: PULMONOLOGY | Facility: HOSPITAL | Age: 79
End: 2024-11-13

## 2024-11-13 ENCOUNTER — HOSPITAL ENCOUNTER (OUTPATIENT)
Dept: RADIOLOGY | Facility: HOSPITAL | Age: 79
Discharge: HOME | End: 2024-11-13
Attending: INTERNAL MEDICINE
Payer: MEDICARE

## 2024-11-13 DIAGNOSIS — R91.1 PULMONARY NODULE 1 CM OR GREATER IN DIAMETER: ICD-10-CM

## 2024-11-13 PROCEDURE — 71250 CT THORAX DX C-: CPT | Mod: 26,,, | Performed by: RADIOLOGY

## 2024-11-13 PROCEDURE — 71250 CT THORAX DX C-: CPT | Mod: TC,PO

## 2024-11-14 NOTE — TELEPHONE ENCOUNTER
Patient's nodule that we were following has decreased dramatically in size and is just about gone.  Told her

## 2025-04-06 ENCOUNTER — HOSPITAL ENCOUNTER (EMERGENCY)
Facility: HOSPITAL | Age: 80
Discharge: HOME | End: 2025-04-06
Attending: EMERGENCY MEDICINE
Payer: MEDICARE

## 2025-04-06 VITALS
BODY MASS INDEX: 26.87 KG/M2 | DIASTOLIC BLOOD PRESSURE: 55 MMHG | SYSTOLIC BLOOD PRESSURE: 110 MMHG | TEMPERATURE: 99 F | HEART RATE: 82 BPM | HEIGHT: 62 IN | OXYGEN SATURATION: 94 % | RESPIRATION RATE: 18 BRPM | WEIGHT: 146 LBS

## 2025-04-06 DIAGNOSIS — R06.02 SOB (SHORTNESS OF BREATH): ICD-10-CM

## 2025-04-06 DIAGNOSIS — R11.0 NAUSEA: Primary | ICD-10-CM

## 2025-04-06 DIAGNOSIS — R00.2 PALPITATIONS: ICD-10-CM

## 2025-04-06 DIAGNOSIS — D72.829 LEUKOCYTOSIS, UNSPECIFIED TYPE: ICD-10-CM

## 2025-04-06 LAB
ALBUMIN SERPL-MCNC: 3.7 G/DL (ref 3.5–5.2)
ALP SERPL-CCNC: 97 UNIT/L (ref 55–135)
ALT SERPL-CCNC: 9 UNIT/L (ref 10–44)
ANION GAP (SMH): 12 MMOL/L (ref 8–16)
ANISOCYTOSIS BLD QL SMEAR: SLIGHT
AST SERPL-CCNC: 15 UNIT/L (ref 10–40)
BILIRUB SERPL-MCNC: 0.4 MG/DL (ref 0.1–1)
BILIRUB UR QL STRIP.AUTO: NEGATIVE
BUN SERPL-MCNC: 21 MG/DL (ref 8–23)
CALCIUM SERPL-MCNC: 9.2 MG/DL (ref 8.7–10.5)
CHLORIDE SERPL-SCNC: 104 MMOL/L (ref 95–110)
CLARITY UR: ABNORMAL
CO2 SERPL-SCNC: 25 MMOL/L (ref 23–29)
COLOR UR AUTO: YELLOW
CREAT SERPL-MCNC: 1 MG/DL (ref 0.5–1.4)
EOSINOPHIL NFR BLD MANUAL: 4 % (ref 0–8)
ERYTHROCYTE [DISTWIDTH] IN BLOOD BY AUTOMATED COUNT: 14.4 % (ref 11.5–14.5)
GFR SERPLBLD CREATININE-BSD FMLA CKD-EPI: 58 ML/MIN/1.73/M2
GLUCOSE SERPL-MCNC: 106 MG/DL (ref 70–110)
GLUCOSE UR QL STRIP: NEGATIVE
HCT VFR BLD AUTO: 43.7 % (ref 37–48.5)
HGB BLD-MCNC: 13.7 GM/DL (ref 12–16)
HGB UR QL STRIP: NEGATIVE
KETONES UR QL STRIP: NEGATIVE
LARGE/GIANT PLATELETS (SMH): PRESENT
LEUKOCYTE ESTERASE UR QL STRIP: NEGATIVE
LIPASE SERPL-CCNC: 10 U/L (ref 4–60)
LYMPHOCYTES NFR BLD MANUAL: 47 % (ref 18–48)
MAGNESIUM SERPL-MCNC: 2 MG/DL (ref 1.6–2.6)
MCH RBC QN AUTO: 28.8 PG (ref 27–31)
MCHC RBC AUTO-ENTMCNC: 31.4 G/DL (ref 32–36)
MCV RBC AUTO: 92 FL (ref 82–98)
MONOCYTES NFR BLD MANUAL: 5 % (ref 4–15)
NEUTROPHILS NFR BLD MANUAL: 44 % (ref 38–73)
NITRITE UR QL STRIP: NEGATIVE
NUCLEATED RBC (/100WBC) (SMH): 0 /100 WBC
OHS QRS DURATION: 76 MS
OHS QTC CALCULATION: 421 MS
PH UR STRIP: 8 [PH]
PLATELET # BLD AUTO: 356 K/UL (ref 150–450)
PLATELET BLD QL SMEAR: NORMAL
PMV BLD AUTO: 10 FL (ref 9.2–12.9)
POIKILOCYTOSIS BLD QL SMEAR: SLIGHT
POTASSIUM SERPL-SCNC: 3.7 MMOL/L (ref 3.5–5.1)
PROT SERPL-MCNC: 6.6 GM/DL (ref 6–8.4)
PROT UR QL STRIP: NEGATIVE
RBC # BLD AUTO: 4.75 M/UL (ref 4–5.4)
SODIUM SERPL-SCNC: 141 MMOL/L (ref 136–145)
SP GR UR STRIP: 1.02
TROPONIN HIGH SENSITIVE (SMH): 3.1 PG/ML
TSH SERPL-ACNC: 4.32 UIU/ML (ref 0.34–5.6)
UROBILINOGEN UR STRIP-ACNC: NEGATIVE EU/DL
WBC # BLD AUTO: 19.63 K/UL (ref 3.9–12.7)

## 2025-04-06 PROCEDURE — 25000003 PHARM REV CODE 250: Performed by: EMERGENCY MEDICINE

## 2025-04-06 PROCEDURE — 81003 URINALYSIS AUTO W/O SCOPE: CPT | Performed by: EMERGENCY MEDICINE

## 2025-04-06 PROCEDURE — 99285 EMERGENCY DEPT VISIT HI MDM: CPT | Mod: 25

## 2025-04-06 PROCEDURE — 84484 ASSAY OF TROPONIN QUANT: CPT | Performed by: EMERGENCY MEDICINE

## 2025-04-06 PROCEDURE — 83735 ASSAY OF MAGNESIUM: CPT | Performed by: EMERGENCY MEDICINE

## 2025-04-06 PROCEDURE — 96375 TX/PRO/DX INJ NEW DRUG ADDON: CPT

## 2025-04-06 PROCEDURE — 96365 THER/PROPH/DIAG IV INF INIT: CPT

## 2025-04-06 PROCEDURE — 84443 ASSAY THYROID STIM HORMONE: CPT | Performed by: EMERGENCY MEDICINE

## 2025-04-06 PROCEDURE — 63600175 PHARM REV CODE 636 W HCPCS: Performed by: EMERGENCY MEDICINE

## 2025-04-06 PROCEDURE — 83690 ASSAY OF LIPASE: CPT | Performed by: EMERGENCY MEDICINE

## 2025-04-06 PROCEDURE — 85007 BL SMEAR W/DIFF WBC COUNT: CPT | Performed by: EMERGENCY MEDICINE

## 2025-04-06 PROCEDURE — 96361 HYDRATE IV INFUSION ADD-ON: CPT

## 2025-04-06 PROCEDURE — 82040 ASSAY OF SERUM ALBUMIN: CPT | Performed by: EMERGENCY MEDICINE

## 2025-04-06 PROCEDURE — 63600175 PHARM REV CODE 636 W HCPCS

## 2025-04-06 RX ORDER — LEVOFLOXACIN 500 MG/1
500 TABLET, FILM COATED ORAL DAILY
COMMUNITY
Start: 2025-03-28 | End: 2025-04-06 | Stop reason: ALTCHOICE

## 2025-04-06 RX ORDER — ONDANSETRON HYDROCHLORIDE 2 MG/ML
4 INJECTION, SOLUTION INTRAVENOUS
Status: COMPLETED | OUTPATIENT
Start: 2025-04-06 | End: 2025-04-06

## 2025-04-06 RX ORDER — ONDANSETRON 4 MG/1
4 TABLET, ORALLY DISINTEGRATING ORAL
Status: COMPLETED | OUTPATIENT
Start: 2025-04-06 | End: 2025-04-06

## 2025-04-06 RX ORDER — ONDANSETRON 4 MG/1
4 TABLET, ORALLY DISINTEGRATING ORAL EVERY 6 HOURS PRN
Qty: 1 TABLET | Refills: 0 | Status: SHIPPED | OUTPATIENT
Start: 2025-04-06

## 2025-04-06 RX ORDER — METHYLPREDNISOLONE 4 MG/1
TABLET ORAL
COMMUNITY
Start: 2025-03-28

## 2025-04-06 RX ORDER — PROMETHAZINE HYDROCHLORIDE AND DEXTROMETHORPHAN HYDROBROMIDE 6.25; 15 MG/5ML; MG/5ML
5 SYRUP ORAL EVERY 6 HOURS PRN
COMMUNITY
Start: 2025-03-28

## 2025-04-06 RX ADMIN — SODIUM CHLORIDE 1000 ML: 9 INJECTION, SOLUTION INTRAVENOUS at 03:04

## 2025-04-06 RX ADMIN — ONDANSETRON 4 MG: 4 TABLET, ORALLY DISINTEGRATING ORAL at 10:04

## 2025-04-06 RX ADMIN — ONDANSETRON 4 MG: 2 INJECTION INTRAMUSCULAR; INTRAVENOUS at 05:04

## 2025-04-06 RX ADMIN — SODIUM CHLORIDE 12.5 MG: 9 INJECTION, SOLUTION INTRAVENOUS at 06:04

## 2025-04-06 NOTE — ED PROVIDER NOTES
Encounter Date: 4/6/2025       History     Chief Complaint   Patient presents with    Nausea    Vomiting     N/V x 24 hrs     Emergent evaluation of a 79-year-old female with history of COVID in November with pneumonia now on home oxygen, diabetes, GERD, kidney stones presents to the ER due to nausea and vomiting for the past 24 hours no blood in the emesis.  No abdominal pain.  No diarrhea or constipation reports she is having dysuria and frequency.  Also reports she is feeling more short of breath than usual and that has dyspnea on exertion despite using her home oxygen.  Positive dry cough.  No fevers chills or sweats.  Positive sore throat no nasal congestion rhinorrhea or headache.  Reports will appetite.  She began feeling palpitations with elevated heart rate yesterday.  No chest pain.  EMS reported glucose of 89.  They gave 4 mg of Zofran which did not significantly improve her nausea and started a bolus of fluids patient has a underlying received a small amount of IV fluids when she arrived heart rate has been 100 and improved to the 70s.  They reported normal blood pressure      Review of patient's allergies indicates:   Allergen Reactions    Pcn [penicillins] Hives    Promethazine hcl Other (See Comments)     Gets muscle spasams    Reglan [metoclopramide hcl] Other (See Comments)     Gets muscle spasams     Past Medical History:   Diagnosis Date    Depression     Diabetes mellitus     borderline    Kidney stone      Past Surgical History:   Procedure Laterality Date    CYSTOSCOPY N/A 9/21/2022    Procedure: CYSTOSCOPY;  Surgeon: Pepper Salas MD;  Location: [facility] OR;  Service: Urology;  Laterality: N/A;    EXTRACORPOREAL SHOCK WAVE LITHOTRIPSY Left 9/21/2022    Procedure: LITHOTRIPSY, ESWL;  Surgeon: Pepper Salas MD;  Location: [facility] OR;  Service: Urology;  Laterality: Left;    RETROGRADE PYELOGRAPHY Left 9/21/2022    Procedure: PYELOGRAM, RETROGRADE;  Surgeon: Pepper PATEL  MD Maritza;  Location: [facility];  Service: Urology;  Laterality: Left;    TONSILLECTOMY, ADENOIDECTOMY      TUBAL LIGATION      WISDOM TOOTH EXTRACTION       Family History   Problem Relation Name Age of Onset    Coronary artery disease Father      Urolithiasis Mother      Diabetes Maternal Grandmother      Thyroid disease Sister      Prostate cancer Neg Hx      Kidney cancer Neg Hx       Social History[1]  Review of Systems   Constitutional:  Positive for activity change and appetite change. Negative for chills, diaphoresis, fatigue and fever.   HENT:  Positive for sore throat. Negative for congestion, postnasal drip and rhinorrhea.    Respiratory:  Positive for shortness of breath. Negative for cough, chest tightness, wheezing and stridor.    Cardiovascular:  Positive for palpitations. Negative for chest pain and leg swelling.   Gastrointestinal:  Positive for nausea and vomiting. Negative for abdominal distention, abdominal pain, blood in stool, constipation and diarrhea.   Genitourinary:  Positive for dysuria and frequency. Negative for flank pain, hematuria and urgency.   Musculoskeletal:  Negative for arthralgias, back pain, myalgias, neck pain and neck stiffness.   Skin:  Negative for rash.   Neurological:  Positive for headaches. Negative for dizziness, syncope, weakness and light-headedness.   Hematological:  Does not bruise/bleed easily.   Psychiatric/Behavioral:  Negative for confusion. The patient is not nervous/anxious.    All other systems reviewed and are negative.      Physical Exam     Initial Vitals [04/06/25 0349]   BP Pulse Resp Temp SpO2   113/67 76 (!) 22 99.2 °F (37.3 °C) 96 %      MAP       --         Physical Exam    Nursing note and vitals reviewed.  Constitutional: She appears well-developed and well-nourished. She is not diaphoretic. No distress.   HENT:   Head: Normocephalic and atraumatic.   Right Ear: External ear normal.   Left Ear: External ear normal.   Nose: Nose normal.  Mouth/Throat: Oropharynx is clear and moist.   Tonsillitis with tonsillar erythema dry mucous membranes   Eyes: Conjunctivae and EOM are normal. Pupils are equal, round, and reactive to light.   Neck: Neck supple. No tracheal deviation present.   Normal range of motion.  Cardiovascular:  Normal rate, regular rhythm, normal heart sounds and intact distal pulses.     Exam reveals no gallop and no friction rub.       No murmur heard.  115/57 pulse 63   Pulmonary/Chest: Breath sounds normal. No stridor. No respiratory distress. She has no wheezes. She has no rhonchi. She has no rales. She exhibits no tenderness.   95% on 2 L respirations 22 clear breath sounds   Abdominal: Abdomen is soft. Bowel sounds are normal. She exhibits no distension and no mass. There is abdominal tenderness.   Suprapubic tenderness There is no rebound and no guarding.   Musculoskeletal:         General: No edema. Normal range of motion.      Cervical back: Normal range of motion and neck supple.     Neurological: She is alert and oriented to person, place, and time. She has normal strength. No cranial nerve deficit or sensory deficit.   Skin: Skin is warm and dry. No rash noted. No erythema. No pallor.   Psychiatric: She has a normal mood and affect. Her behavior is normal. Judgment and thought content normal.         ED Course   Procedures  Labs Reviewed   COMPREHENSIVE METABOLIC PANEL - Abnormal       Result Value    Sodium 141      Potassium 3.7      Chloride 104      CO2 25      Glucose 106      BUN 21      Creatinine 1.0      Calcium 9.2      Protein Total 6.6      Albumin 3.7      Bilirubin Total 0.4      ALP 97      AST 15      ALT 9 (*)     Anion Gap 12      eGFR 58 (*)    URINALYSIS, REFLEX TO URINE CULTURE - Abnormal    Color, UA Yellow      Appearance, UA Hazy (*)     Spec Grav UA 1.020      pH, UA 8.0      Protein, UA Negative      Glucose, UA Negative      Ketones, UA Negative      Blood, UA Negative      Bilirubin, UA Negative       Urobilinogen, UA Negative      Nitrites, UA Negative      Leukocyte Esterase, UA Negative     CBC WITH DIFFERENTIAL - Abnormal    WBC 19.63 (*)     RBC 4.75      Hgb 13.7      Hct 43.7      MCV 92      MCH 28.8      MCHC 31.4 (*)     RDW 14.4      Platelet Count 356      MPV 10.0      Nucleated RBC 0     LIPASE - Normal    Lipase Level 10     TROPONIN I HIGH SENSITIVITY - Normal    Troponin High Sensitive 3.1     MAGNESIUM - Normal    Magnesium 2.0     TSH - Normal    TSH 4.315     CBC W/ AUTO DIFFERENTIAL    Narrative:     The following orders were created for panel order CBC W/ AUTO DIFFERENTIAL.  Procedure                               Abnormality         Status                     ---------                               -----------         ------                     CBC with Differential[[id number]]       Abnormal            Final result               Manual Differential[[id number]]                             Final result                 Please view results for these tests on the individual orders.   MANUAL DIFFERENTIAL    Segmented Neutrophil % 44.0      Lymphocyte % 47.0      Monocyte % 5.0      Eosinophil % 4.0      Platelet Estimate Appears Normal      Anisocyte Slight      Poik Slight      Large/Giant Platelets Present     PATHOLOGIST INTERPRETATION DIFFERENTIAL     EKG Readings: (Independently Interpreted)   Heart Rate: 74.   Normal sinus rhythm no ectopy or conduction delay no ischemic changes poor quality EKG     ECG Results              EKG 12-lead (In process)        Collection Time Result Time QRS Duration OHS QTC Calculation    04/06/25 03:45:33 04/06/25 06:15:39 76 421                     In process by Interface, Lab In [facility] (04/06/25 06:15:46)                   Narrative:    Test Reason : R00.2,    Vent. Rate :  74 BPM     Atrial Rate : 202 BPM     P-R Int : 106 ms          QRS Dur :  76 ms      QT Int : 380 ms       P-R-T Axes :   2  66  69 degrees    QTcB Int : 421 ms    Undetermined  rhythm  Otherwise normal ECG  When compared with ECG of 13-Jul-2024 19:37,  Current undetermined rhythm precludes rhythm comparison, needs review    Referred By: AAAREFERRAL SELF           Confirmed By:                                   Imaging Results              X-Ray Chest AP Portable (Final result)  Result time 04/06/25 07:48:23      Final result by Glen Franco MD (04/06/25 07:48:23)                   Impression:      Decreased lung volumes with medial right basilar opacity either reflecting minimal consolidation or atelectasis.      Electronically signed by: Glen Franco  Date:    04/06/2025  Time:    07:48               Narrative:    EXAMINATION:  XR CHEST AP PORTABLE    CLINICAL HISTORY:  Shortness of breath    FINDINGS:  Portable chest at 605 compared with 12/11/2023 shows normal cardiomediastinal silhouette.    Medial right basilar opacity has not significantly changed since 11/30/2023, but had resolved with improved inspiration on 12/11/2023, suggesting atelectasis or minimal consolidation.  Left lung is clear.  Pulmonary vasculature is normal. No acute osseous abnormality.                        Wet Read by Riddhi Howard MD (04/06/25 06:32:00, [facility] - Emergency Dept, Emergency Medicine)    No pulmonary infiltrates no pleural effusions no cardiomegaly no pneumothorax                                     Medications   sodium chloride 0.9% bolus 1,000 mL 1,000 mL (0 mLs Intravenous Stopped 4/6/25 0645)   ondansetron injection 4 mg (4 mg Intravenous Given 4/6/25 0515)   promethazine (PHENERGAN) 12.5 mg in 0.9% NaCl 50 mL IVPB (0 mg Intravenous Stopped 4/6/25 0645)   ondansetron disintegrating tablet 4 mg (4 mg Oral Given 4/6/25 1038)     Medical Decision Making  Emergent evaluation of a 79-year-old female with history of COVID in November with pneumonia now on home oxygen, diabetes, GERD, kidney stones presents to the ER due to nausea and vomiting for the past 24 hours no blood  in the emesis.  No abdominal pain.  No diarrhea or constipation reports she is having dysuria and frequency.  Also reports she is feeling more short of breath than usual and that has dyspnea on exertion despite using her home oxygen.  Positive dry cough.  No fevers chills or sweats.  Positive sore throat no nasal congestion rhinorrhea or headache.  Reports will appetite.  She began feeling palpitations with elevated heart rate yesterday.  No chest pain.  EMS reported glucose of 89.  They gave 4 mg of Zofran which did not significantly improve her nausea and started a bolus of fluids patient has a underlying received a small amount of IV fluids when she arrived heart rate has been 100 and improved to the 70s.  They reported normal blood pressure  On exam patient is in no distress talkative friendly on home oxygen sats 95 93% respirations 22 clear breath sounds normal blood pressure and heart rate afebrile tenderness suprapubically no tenderness throughout the rest of the abdomen positive pharyngitis and tonsillitis with dry mucous membranes patient looks dehydrated.  Getting IV fluids at this time reports her nausea is not improved  MDM    Patient presents for emergent evaluation of acute nausea vomiting dysuria suprapubic abdominal pain since yesterday with a worsening shortness of breath that poses a threat to life and/or bodily function.   Differential diagnosis includes but was not limited to acute pancreatitis, acute cholecystitis and cholangitis, colitis, acute appendicitis, urinary tract infection, ovarian or testicular torsion, if female PID, TOA,  pyelonephritis, kidney stone, volvulus, small bowel obstruction, enteritis, gastritis, colitis, mesenteric ischemia, AAA, AAA rupture, aortic dissection, constipation, upper or lower gi bleeding, traumatic injury, pneumonia bronchitis interstitial lung disease chronic shortness of breath, electrolyte abnormalities ACS cardiac dysrhythmia.     In the ED patient  found to have acute nausea vomiting, dehydration, suprapubic abdominal pain with dysuria, shortness of breath  I ordered labs and personally reviewed them.  Labs significant for see below  I ordered X-rays of the chest  I ordered EKG and personally reviewed it.  EKG significant for see above.       MDM     Patient was managed in the ED with IV 1 L normal saline 4 mg of Zofran nausea was not improve will give Phenergan 12.5 mg.  Repeating EKGs due to 1st EKG being poor quality.  Chest x-ray pending.  Awaiting CBC at this time, 2nd troponin and UA  The response to treatment was good.  Care will be turned over to oncoming physician at the end of my shift while awaiting labs chest x-ray and re-evaluation  Riddhi Howard MD      Amount and/or Complexity of Data Reviewed  Labs: ordered. Decision-making details documented in ED Course.  Radiology: ordered and independent interpretation performed.  ECG/medicine tests: ordered and independent interpretation performed. Decision-making details documented in ED Course.               ED Course as of 04/06/25 1043   Sun Apr 06, 2025   0559 Normal CMP  TSH 4.315  Lipase 10  Troponin 3.1  Mag 2 [RM]   0631 White count 19.63 otherwise normal [RM]   1039 Patient found with leukocytosis workup with white count [id number] however has been on steroids for the last several days.  She presents emergency department with complaint of subjective heart racing/palpitations and nausea.  Patient denied vomiting, no chest pain, baseline shortness of breath, denied any other abdominal pain, fever, no other constitutional symptoms.  Currently at this time patient did state that symptoms were better.  Did have mild nausea.  Was offered another medication I such as Phenergan however stated that she had intolerance to it and she is concerned of getting droperidol as well.  At this time patient was amenable to being discharged with Zofran ODT and close follow up with her primary care provider.  Given  detailed return precautions.  Patient will be discharged.  Also instructed that she may need outpatient Holter monitor to evaluate for palpitations.  Currently without evidence of ischemic changes on EKG which was found to be normal sinus rhythm.  Troponin at 3.1.  Discharged in good condition. [RM-2]      ED Course User Index  [RM] Riddhi Howard MD  [RM-2] Gui Saravia MD                           Clinical Impression:  Final diagnoses:  [R00.2] Palpitations  [R06.02] SOB (shortness of breath)  [R11.0] Nausea (Primary)                   [1]   Social History  Tobacco Use    Smoking status: Never    Smokeless tobacco: Never   Substance Use Topics    Alcohol use: No    Drug use: No        Gui Saravia MD  04/06/25 [id number]

## 2025-04-21 ENCOUNTER — HOSPITAL ENCOUNTER (EMERGENCY)
Facility: HOSPITAL | Age: 80
Discharge: HOME | End: 2025-04-22
Attending: EMERGENCY MEDICINE
Payer: MEDICARE

## 2025-04-21 ENCOUNTER — OFFICE VISIT (OUTPATIENT)
Dept: URGENT CARE | Facility: CLINIC | Age: 80
End: 2025-04-21
Payer: MEDICARE

## 2025-04-21 VITALS
HEART RATE: 103 BPM | OXYGEN SATURATION: 97 % | SYSTOLIC BLOOD PRESSURE: 125 MMHG | TEMPERATURE: 99 F | HEIGHT: 62 IN | WEIGHT: 145 LBS | BODY MASS INDEX: 26.68 KG/M2 | RESPIRATION RATE: 20 BRPM | DIASTOLIC BLOOD PRESSURE: 79 MMHG

## 2025-04-21 DIAGNOSIS — J01.90 ACUTE BACTERIAL SINUSITIS: ICD-10-CM

## 2025-04-21 DIAGNOSIS — B96.89 ACUTE BACTERIAL SINUSITIS: ICD-10-CM

## 2025-04-21 DIAGNOSIS — R00.2 PALPITATIONS: ICD-10-CM

## 2025-04-21 DIAGNOSIS — R42 DIZZINESS: ICD-10-CM

## 2025-04-21 DIAGNOSIS — R11.2 NAUSEA AND VOMITING, UNSPECIFIED VOMITING TYPE: Primary | ICD-10-CM

## 2025-04-21 DIAGNOSIS — R53.1 GENERALIZED WEAKNESS: ICD-10-CM

## 2025-04-21 DIAGNOSIS — R10.84 GENERALIZED ABDOMINAL DISCOMFORT: ICD-10-CM

## 2025-04-21 LAB
ABSOLUTE EOSINOPHIL (SMH): 0.76 K/UL
ABSOLUTE MONOCYTE (SMH): 0.91 K/UL (ref 0.3–1)
ABSOLUTE NEUTROPHIL COUNT (SMH): 7.3 K/UL (ref 1.8–7.7)
ALBUMIN SERPL-MCNC: 4.2 G/DL (ref 3.5–5.2)
ALP SERPL-CCNC: 112 UNIT/L (ref 55–135)
ALT SERPL-CCNC: 6 UNIT/L (ref 10–44)
AMPHET UR QL SCN: NEGATIVE
ANION GAP (SMH): 7 MMOL/L (ref 8–16)
AST SERPL-CCNC: 16 UNIT/L (ref 10–40)
BACTERIA #/AREA URNS AUTO: NORMAL /HPF
BARBITURATE SCN PRESENT UR: NEGATIVE
BASOPHILS # BLD AUTO: 0.1 K/UL
BASOPHILS NFR BLD AUTO: 0.7 %
BENZODIAZ UR QL SCN: NEGATIVE
BILIRUB SERPL-MCNC: 0.6 MG/DL (ref 0.1–1)
BILIRUB UR QL STRIP.AUTO: NEGATIVE
BUN SERPL-MCNC: 13 MG/DL (ref 8–23)
CALCIUM SERPL-MCNC: 9.2 MG/DL (ref 8.7–10.5)
CANNABINOIDS UR QL SCN: ABNORMAL
CHLORIDE SERPL-SCNC: 103 MMOL/L (ref 95–110)
CLARITY UR: CLEAR
CO2 SERPL-SCNC: 28 MMOL/L (ref 23–29)
COCAINE UR QL SCN: NEGATIVE
COLOR UR AUTO: YELLOW
CREAT SERPL-MCNC: 0.8 MG/DL (ref 0.5–1.4)
CREAT UR-MCNC: 165.5 MG/DL (ref 15–325)
ERYTHROCYTE [DISTWIDTH] IN BLOOD BY AUTOMATED COUNT: 14.6 % (ref 11.5–14.5)
GFR SERPLBLD CREATININE-BSD FMLA CKD-EPI: >60 ML/MIN/1.73/M2
GLUCOSE SERPL-MCNC: 114 MG/DL (ref 70–110)
GLUCOSE UR QL STRIP: NEGATIVE
HCT VFR BLD AUTO: 46.9 % (ref 37–48.5)
HGB BLD-MCNC: 14.6 GM/DL (ref 12–16)
HGB UR QL STRIP: NEGATIVE
IMM GRANULOCYTES # BLD AUTO: 0.04 K/UL (ref 0–0.04)
IMM GRANULOCYTES NFR BLD AUTO: 0.3 % (ref 0–0.5)
KETONES UR QL STRIP: ABNORMAL
LEUKOCYTE ESTERASE UR QL STRIP: ABNORMAL
LIPASE SERPL-CCNC: 7 U/L (ref 4–60)
LYMPHOCYTES # BLD AUTO: 5.71 K/UL (ref 1–4.8)
MCH RBC QN AUTO: 28.6 PG (ref 27–31)
MCHC RBC AUTO-ENTMCNC: 31.1 G/DL (ref 32–36)
MCV RBC AUTO: 92 FL (ref 82–98)
MICROSCOPIC COMMENT: NORMAL
NITRITE UR QL STRIP: NEGATIVE
NUCLEATED RBC (/100WBC) (SMH): 0 /100 WBC
OPIATES UR QL SCN: NEGATIVE
PCP UR QL: NEGATIVE
PH UR STRIP: 7 [PH]
PLATELET # BLD AUTO: 382 K/UL (ref 150–450)
PMV BLD AUTO: 10.3 FL (ref 9.2–12.9)
POTASSIUM SERPL-SCNC: 4 MMOL/L (ref 3.5–5.1)
PROT SERPL-MCNC: 7.5 GM/DL (ref 6–8.4)
PROT UR QL STRIP: ABNORMAL
RBC # BLD AUTO: 5.11 M/UL (ref 4–5.4)
RBC #/AREA URNS AUTO: 1 /HPF
RELATIVE EOSINOPHIL (SMH): 5.1 % (ref 0–8)
RELATIVE LYMPHOCYTE (SMH): 38.5 % (ref 18–48)
RELATIVE MONOCYTE (SMH): 6.1 % (ref 4–15)
RELATIVE NEUTROPHIL (SMH): 49.3 % (ref 38–73)
SODIUM SERPL-SCNC: 138 MMOL/L (ref 136–145)
SP GR UR STRIP: 1.02
SQUAMOUS #/AREA URNS AUTO: 1 /HPF
TROPONIN HIGH SENSITIVE (SMH): 4.5 PG/ML
TSH SERPL-ACNC: 2.55 UIU/ML (ref 0.34–5.6)
UROBILINOGEN UR STRIP-ACNC: NEGATIVE EU/DL
WBC # BLD AUTO: 14.85 K/UL (ref 3.9–12.7)
WBC #/AREA URNS AUTO: 5 /HPF

## 2025-04-21 PROCEDURE — 81003 URINALYSIS AUTO W/O SCOPE: CPT | Performed by: PHYSICIAN ASSISTANT

## 2025-04-21 PROCEDURE — 82040 ASSAY OF SERUM ALBUMIN: CPT | Performed by: PHYSICIAN ASSISTANT

## 2025-04-21 PROCEDURE — 84484 ASSAY OF TROPONIN QUANT: CPT | Performed by: EMERGENCY MEDICINE

## 2025-04-21 PROCEDURE — 85025 COMPLETE CBC W/AUTO DIFF WBC: CPT | Performed by: PHYSICIAN ASSISTANT

## 2025-04-21 PROCEDURE — 36415 COLL VENOUS BLD VENIPUNCTURE: CPT | Performed by: INTERNAL MEDICINE

## 2025-04-21 PROCEDURE — 63600175 PHARM REV CODE 636 W HCPCS: Performed by: EMERGENCY MEDICINE

## 2025-04-21 PROCEDURE — 25500020 PHARM REV CODE 255: Performed by: EMERGENCY MEDICINE

## 2025-04-21 PROCEDURE — 25000003 PHARM REV CODE 250: Performed by: EMERGENCY MEDICINE

## 2025-04-21 PROCEDURE — 93005 ELECTROCARDIOGRAM TRACING: CPT | Performed by: INTERNAL MEDICINE

## 2025-04-21 PROCEDURE — 99285 EMERGENCY DEPT VISIT HI MDM: CPT | Mod: 25

## 2025-04-21 PROCEDURE — 84443 ASSAY THYROID STIM HORMONE: CPT | Performed by: EMERGENCY MEDICINE

## 2025-04-21 PROCEDURE — 80307 DRUG TEST PRSMV CHEM ANLYZR: CPT | Performed by: EMERGENCY MEDICINE

## 2025-04-21 PROCEDURE — 96361 HYDRATE IV INFUSION ADD-ON: CPT

## 2025-04-21 PROCEDURE — 96374 THER/PROPH/DIAG INJ IV PUSH: CPT

## 2025-04-21 PROCEDURE — 93010 ELECTROCARDIOGRAM REPORT: CPT | Mod: ,,, | Performed by: INTERNAL MEDICINE

## 2025-04-21 PROCEDURE — 83690 ASSAY OF LIPASE: CPT | Performed by: PHYSICIAN ASSISTANT

## 2025-04-21 RX ORDER — ONDANSETRON HYDROCHLORIDE 2 MG/ML
4 INJECTION, SOLUTION INTRAVENOUS
Status: COMPLETED | OUTPATIENT
Start: 2025-04-21 | End: 2025-04-21

## 2025-04-21 RX ADMIN — SODIUM CHLORIDE 1000 ML: 9 INJECTION, SOLUTION INTRAVENOUS at 08:04

## 2025-04-21 RX ADMIN — ONDANSETRON 4 MG: 2 INJECTION INTRAMUSCULAR; INTRAVENOUS at 08:04

## 2025-04-21 RX ADMIN — IOHEXOL 100 ML: 350 INJECTION, SOLUTION INTRAVENOUS at 08:04

## 2025-04-21 NOTE — ED PROVIDER NOTES
"Encounter Date: 4/21/2025       History     Chief Complaint   Patient presents with    Emesis     Vomiting started for over 1 month ago. Was here for same symptoms 2 wks ago.  Pt denies any pain.  "Dehydrated"      HPI  Patient with PMH anxiety/depression, borderline diabetes, kidney stones, COVID pneumonia in November now on home oxygen, GERD who presents to the ED with intermittent nausea and vomiting that has been ongoing for about 2 months.  She states she has had these symptoms near daily for the last 1 month.  Has had previous ED visit on April 6 and saw her PCP.  Was prescribed Zofran which helps somewhat.  She can not tolerate promethazine or Reglan due to side effects.  States she sometimes uses over-the-counter Emetrol.  She states she was told she had stomach ulcers many years ago but refuses to ever have an EGD or colonoscopy again.  She denies any chest pain but has had episodes of palpitations and lightheadedness feeling as if she is going to pass out.  She believes she is dehydrated.  Also believes she has a sinus infection with postnasal drip that is contributing to her nausea.  She has had a dry cough for over a month.  At previous ED visit she was treated with IV fluids and antiemetics, labs were deemed stable for discharge.  Was recommended that she follow up with her PCP and cardiologist for Holter monitor which she has not yet done.  She denies any known fever.  She has intermittent generalized abdominal pain described as cramping.  She states she has chronic fluctuating diarrhea and constipation which is unchanged from baseline.  She does not drink alcohol.  I did question if she uses any illicit substances including marijuana and she states that her daughter had given her some delta 8 and Delta 9 CBD gummies which she was taking to help her sleep.  She began taking these a couple of months ago prior to onset of symptoms.    Review of patient's allergies indicates:   Allergen Reactions    Pcn " [penicillins] Hives    Promethazine hcl Other (See Comments)     Gets muscle spasams    Reglan [metoclopramide hcl] Other (See Comments)     Gets muscle spasams     Past Medical History:   Diagnosis Date    Depression     Diabetes mellitus     borderline    Kidney stone      Past Surgical History:   Procedure Laterality Date    CYSTOSCOPY N/A 9/21/2022    Procedure: CYSTOSCOPY;  Surgeon: Pepper Salas MD;  Location: [facility];  Service: Urology;  Laterality: N/A;    EXTRACORPOREAL SHOCK WAVE LITHOTRIPSY Left 9/21/2022    Procedure: LITHOTRIPSY, ESWL;  Surgeon: Pepper Salas MD;  Location: [facility] OR;  Service: Urology;  Laterality: Left;    RETROGRADE PYELOGRAPHY Left 9/21/2022    Procedure: PYELOGRAM, RETROGRADE;  Surgeon: Pepper Salas MD;  Location: [facility] OR;  Service: Urology;  Laterality: Left;    TONSILLECTOMY, ADENOIDECTOMY      TUBAL LIGATION      WISDOM TOOTH EXTRACTION       Family History   Problem Relation Name Age of Onset    Coronary artery disease Father      Urolithiasis Mother      Diabetes Maternal Grandmother      Thyroid disease Sister      Prostate cancer Neg Hx      Kidney cancer Neg Hx       Social History[1]  Review of Systems   Constitutional:  Positive for fatigue. Negative for fever.   HENT:  Positive for congestion and postnasal drip. Negative for sore throat.    Respiratory:  Positive for cough. Negative for shortness of breath.    Cardiovascular:  Positive for palpitations. Negative for chest pain.   Gastrointestinal:  Positive for abdominal pain, nausea and vomiting.   Genitourinary:  Negative for dysuria.   Musculoskeletal:  Negative for back pain.   Skin:  Negative for rash.   Neurological:  Positive for light-headedness. Negative for weakness.   Hematological:  Does not bruise/bleed easily.       Physical Exam     Initial Vitals [04/21/25 1527]   BP Pulse Resp Temp SpO2   (!) 149/84 104 18 98.4 °F (36.9 °C) 95 %      MAP       --         Physical  Exam    Nursing note and vitals reviewed.  Constitutional: She appears well-developed and well-nourished. No distress.   HENT:   Head: Normocephalic and atraumatic. Mouth/Throat: Oropharynx is clear and moist. No oropharyngeal exudate.   Bilateral maxillary sinus tenderness   Eyes: EOM are normal. Pupils are equal, round, and reactive to light.   Neck: Neck supple.   Normal range of motion.  Cardiovascular:  Normal rate and regular rhythm.           Pulmonary/Chest: Breath sounds normal. No respiratory distress.   Abdominal: Abdomen is soft. There is no abdominal tenderness. There is no rebound and no guarding.   Musculoskeletal:         General: No tenderness or edema. Normal range of motion.      Cervical back: Normal range of motion and neck supple.     Neurological: She is alert and oriented to person, place, and time. She has normal strength. No cranial nerve deficit. GCS score is 15. GCS eye subscore is 4. GCS verbal subscore is 5. GCS motor subscore is 6.   Skin: Skin is warm and dry. Capillary refill takes less than 2 seconds. No rash noted.   Psychiatric: She has a normal mood and affect. Thought content normal.         ED Course   Procedures  Labs Reviewed   COMPREHENSIVE METABOLIC PANEL - Abnormal       Result Value    Sodium 138      Potassium 4.0      Chloride 103      CO2 28      Glucose 114 (*)     BUN 13      Creatinine 0.8      Calcium 9.2      Protein Total 7.5      Albumin 4.2      Bilirubin Total 0.6            AST 16      ALT 6 (*)     Anion Gap 7 (*)     eGFR >60     URINALYSIS, REFLEX TO URINE CULTURE - Abnormal    Color, UA Yellow      Appearance, UA Clear      Spec Grav UA 1.020      pH, UA 7.0      Protein, UA Trace (*)     Glucose, UA Negative      Ketones, UA 1+ (*)     Blood, UA Negative      Bilirubin, UA Negative      Urobilinogen, UA Negative      Nitrites, UA Negative      Leukocyte Esterase, UA 1+ (*)    CBC WITH DIFFERENTIAL - Abnormal    WBC 14.85 (*)     RBC 5.11      Hgb  14.6      Hct 46.9      MCV 92      MCH 28.6      MCHC 31.1 (*)     RDW 14.6 (*)     Platelet Count 382      MPV 10.3      Nucleated RBC 0      Neut % 49.3      Lymph % 38.5      Mono % 6.1      Eos % 5.1      Basophil % 0.7      Imm Grans % 0.3      Neut # 7.3      Lymph # 5.71 (*)     Mono # 0.91      Eos # 0.76 (*)     Baso # 0.10      Imm Grans # 0.04     DRUG SCREEN PANEL, URINE EMERGENCY - Abnormal    Benzodiazepine, Urine Negative      Cocaine, Urine Negative      Opiates, Urine Negative      Barbituates, Urine Negative      Amphetamines, Urine Negative      THC Presumptive Positive (*)     Phencyclidine, Urine Negative      Urine Creatinine 165.5      Narrative:     This screen includes the following classes of drugs at the   listed cut-off:    Benzodiazepines                  200 ng/ml  Cocaine metabolite               300 ng/ml  Opiates                          300 ng/ml  Barbiturates                     200 ng/ml  Amphetamines                    1000 ng/ml  Marijuana metabs (THC)            50 ng/ml  Phencyclidine (PCP)               25 ng/ml    High concentrations of Methylenedioxymethamphetamine (MDMA aka  Ectasy) and other structurally similar compounds may cross-   react with the Amphetamine/Methamphetamine screening   immunoassay giving a false positive result.    Note: This exception list includes only more common   interferants in toxicology screen testing.  Because of many cross-reactantspositive results on toxicology drug screens   should be confirmed whenever results do not correlate with   clinical presentation.    This report is intended for use in clinical monitoring and  management of patients. It is not intended for use in   employment related drug testing.    Because of any cross-reactants, positive results on toxicology  drug screens should be confirmed whenever results do not  correlate with clinical presentation.    Presumptive positive results are unconfirmed and may be used   only for  medical purposes.   LIPASE - Normal    Lipase Level 7     TROPONIN I HIGH SENSITIVITY - Normal    Troponin High Sensitive 4.5     TSH - Normal    TSH 2.552     CBC W/ AUTO DIFFERENTIAL    Narrative:     The following orders were created for panel order CBC W/ AUTO DIFFERENTIAL.  Procedure                               Abnormality         Status                     ---------                               -----------         ------                     CBC with Differential[[id number]]       Abnormal            Final result                 Please view results for these tests on the individual orders.   URINALYSIS MICROSCOPIC    RBC, UA 1      WBC, UA 5      Bacteria, UA Rare      Squamous Epithelial Cells, UA 1      Microscopic Comment       EXTRA TUBES    Narrative:     The following orders were created for panel order EXTRA TUBES.  Procedure                               Abnormality         Status                     ---------                               -----------         ------                     Light Blue Top Hold[[id number]]                             In process                 Lavender Top Hold[[id number]]                               In process                 Gold Top Hold[[id number]]                                   In process                   Please view results for these tests on the individual orders.   LIGHT BLUE TOP HOLD   LAVENDER TOP HOLD   GOLD TOP HOLD     EKG Readings: (Independently Interpreted)   NSR, HR 76, low voltage criteria, normal intervals, no STEMI.       Imaging Results              CT Abdomen Pelvis With IV Contrast NO Oral Contrast (Final result)  Result time 04/21/25 21:21:44      Final result by Raffi Carvalho MD (04/21/25 21:21:44)                   Impression:      No acute findings in the abdomen or pelvis.    Nonobstructing left-sided renal stones.    Other incidental findings discussed in the body of the report.      Electronically signed by: Raffi Carvalho  MD  Date:    04/21/2025  Time:    21:21               Narrative:    EXAMINATION:  CT ABDOMEN PELVIS WITH IV CONTRAST    CLINICAL HISTORY:  Nausea/vomiting;Abdominal pain, acute, nonlocalized;    TECHNIQUE:  Low dose axial images, sagittal and coronal reformations were obtained from the lung bases to the pubic symphysis following the IV administration of 100 mL of Omnipaque 350 .  Oral contrast was not given.    COMPARISON:  CT abdomen pelvis, 11/02/2022.  CT chest, 11/03/2024.    FINDINGS:  Lower Chest:    Bibasilar subsegmental atelectasis.  Heart is not significantly enlarged.  No significant pericardial effusion.  No pleural effusion.  Right hemidiaphragm is elevated.    Abdomen:    Liver is normal in size and contour.  No focal hepatic lesion.  Subcentimeter cystic lesions in the left hepatic lobe, similar to prior.  No worrisome liver mass identified on this single phase study.  Gallbladder is unremarkable.  No intrahepatic biliary ductal dilatation.    Spleen is not enlarged.  Adrenal glands and pancreas are unremarkable.  There is mild fatty atrophy of the pancreatic head region.    The kidneys are symmetric.  No hydronephrosis. Subcentimeter renal hypodensities, too small to definitively characterize.  Punctate nonobstructing left-sided renal stones, measuring roughly 3-4 mm in the lower pole of the left kidney.  No asymmetric perinephric fat stranding.    No small bowel obstruction.  Colonic diverticulosis.  No overt inflammatory changes identified in bowel.    No pneumoperitoneum or organized fluid collection.    No bulky retroperitoneal lymphadenopathy.    Abdominal aorta is normal in caliber with mild atherosclerosis.    Portal, splenic, and superior mesenteric veins are patent. No portal venous gas.    Pelvis:    Urinary bladder is relatively decompressed and not well evaluated.  Uterus and rectum are negative for acute finding.  No significant pelvic free fluid.    Bones and soft tissues:    No  aggressive osseous lesions.  Degenerative changes in the spine.  Probable hemangiomas in the L2 and L3 vertebral bodies.  Extraperitoneal soft tissues are negative for acute finding.  Diastasis recti and small fat containing umbilical hernia.                                       X-Ray Chest PA And Lateral (Final result)  Result time 04/21/25 19:09:13   Procedure changed from X-Ray Chest 1 View     Final result by Palomo Vila MD (04/21/25 19:09:13)                   Narrative:    EXAMINATION:  XR CHEST PA AND LATERAL    CLINICAL HISTORY:  generalized weakness; Weakness    TECHNIQUE:  PA and lateral views of the chest were performed.    COMPARISON:  04/06/2025    FINDINGS:  Lungs are clear.Normal cardiomediastinal silhouette.Normal pulmonary vascular distribution.No pleural effusion or pneumothorax.No acute osseous abnormality.      Electronically signed by: Palomo Vila  Date:    04/21/2025  Time:    19:09                                     Medications   prochlorperazine injection Soln 5 mg (has no administration in time range)   diphenhydrAMINE injection 6.25 mg (has no administration in time range)   lactated ringers infusion (has no administration in time range)   sodium chloride 0.9% bolus 1,000 mL 1,000 mL (0 mLs Intravenous Stopped 4/22/25 0104)   ondansetron injection 4 mg (4 mg Intravenous Given 4/21/25 2033)   iohexoL (OMNIPAQUE 350) injection 100 mL (100 mLs Intravenous Given 4/21/25 2016)   acetaminophen tablet 1,000 mg (1,000 mg Oral Given 4/22/25 0359)     Medical Decision Making  Amount and/or Complexity of Data Reviewed  Independent Historian:      Details: daughter  External Data Reviewed: notes.  Labs: ordered. Decision-making details documented in ED Course.  Radiology: ordered and independent interpretation performed. Decision-making details documented in ED Course.  ECG/medicine tests: ordered and independent interpretation performed. Decision-making details documented in ED  Course.    Risk  OTC drugs.  Prescription drug management.  Decision regarding hospitalization.                     Patient presents to ED as above.  Vitals stable.  Differential includes not limited to gastroenteritis, pancreatitis, gastritis, GERD, esophagitis, cholecystitis, bowel obstruction, metabolic derangement, arrhythmia, thyroid dysfunction, UTI, cyclic vomiting syndrome, cannabinoid hyperemesis.  All labs reviewed by me and significant for nonspecific leukocytosis of 14.8 which is chronically elevated and downtrending from priors, normal hemoglobin, stable electrolytes, normal renal function, unremarkable LFTs, normal lipase, negative troponin, normal TSH, UA with 1+ ketones, negative for UTI, urine drug screen positive for marijuana.  Imaging includes chest x-ray and CT abdomen/pelvis with IV contrast.  Per radiology read there is no acute cardiopulmonary process.  CT shows No acute findings in the abdomen or pelvis.  Nonobstructing left-sided renal stones.   Patient was given 1 L of normal saline here, IV Zofran x1.  On reassessment she was requesting food and beverage and tolerated water, crackers and peanut butter at bedside with no further vomiting.  We discussed the plan of refill of Zofran, restart Protonix as her GERD/gastritis could be contributing, Bentyl for as needed use, Z-Jose and Flonase for acute sinusitis and patient was happy with this plan.  She however did state that she needed to stay until the morning because she did not have a ride home as her daughter lives in [place] and had gone home for the night.  We were planning to d/c pt home via transport given she requires oxygen therapy and when informed of that, she became irate.  I went to the room and she began removing all of her equipment.  I advised her to not do this as her oxygen saturation was clearly dropping without her home oxygen.  She stated she did not care and continued to try to storm out of the room.  Myself and nursing  witnesses were able to deescalate the situation and have patient get back in her bed and put on her oxygen.  She stated she was still nauseous and had a headache now from being here all night and is requesting additional fluids and meds.  I did again offer admission which she stated was unnecessary.  We will start her on maintenance fluids while she awaits transport, although I did explain that she does not appear to be severely dehydrated based upon her lab work.  She would like to try Compazine and Benadryl combo for her nausea (experiences restlessness with promethazine).  She was additionally given Tylenol for headache.  Of note, she has had no vomiting since her arrival to the ED and did tolerate p.o. as documented above.  We again discussed outpatient plan and she verbalizes understanding.              Clinical Impression:  Final diagnoses:  [R53.1] Generalized weakness  [R42] Dizziness  [R11.2] Nausea and vomiting, unspecified vomiting type (Primary)  [R00.2] Palpitations  [R10.84] Generalized abdominal discomfort  [J01.90, B96.89] Acute bacterial sinusitis          ED Disposition Condition    Discharge Stable          ED Prescriptions       Medication Sig Dispense Start Date End Date Auth. Provider    ondansetron (ZOFRAN-ODT) 4 MG TbDL Take 1 tablet (4 mg total) by mouth every 6 (six) hours as needed (Nausea). 1 tablet 4/22/2025 -- Pepper Kim MD    pantoprazole (PROTONIX) 40 MG tablet Take 1 tablet (40 mg total) by mouth once daily. 30 tablet 4/22/2025 5/22/2025 Pepper Kim MD    dicyclomine (BENTYL) 10 MG capsule Take 1 capsule (10 mg total) by mouth 3 (three) times daily as needed (abdominal cramping). 12 capsule 4/22/2025 -- Pepper Kim MD    azithromycin (Z-GAMA) 250 MG tablet Take 2 tablets by mouth on day 1; Take 1 tablet by mouth on days 2-5 6 tablet 4/22/2025 4/27/2025 Pepper Kim MD    fluticasone propionate (FLONASE) 50 mcg/actuation nasal spray 1 spray (50 mcg total) by  Each Nostril route daily as needed for Rhinitis or Allergies. 15 g 4/22/2025 -- Pepper Kim MD    prochlorperazine (COMPAZINE) 5 MG tablet Take 1 tablet (5 mg total) by mouth every 6 (six) hours as needed (nausea). 12 tablet 4/22/2025 -- Pepper Kim MD          Follow-up Information       Follow up With Specialties Details Why Contact Info Additional Information    Kareem Sosa MD Internal Medicine Schedule an appointment as soon as possible for a visit   [address] [facility]  [address]  [place] [address]  [phone]       [facility] - Emergency Dept Emergency Medicine  As needed, If symptoms worsen [address] [facility] [address]  [phone] 1st floor                 [1]   Social History  Tobacco Use    Smoking status: Never    Smokeless tobacco: Never   Substance Use Topics    Alcohol use: No    Drug use: No        Pepper Kim MD  04/22/25 0434

## 2025-04-21 NOTE — FIRST PROVIDER EVALUATION
" Emergency Department TeleTriage Encounter Note      CHIEF COMPLAINT    Chief Complaint   Patient presents with    Emesis     Vomiting started for over 1 month ago. Was here for same symptoms 2 wks ago.  Pt denies any pain.  "Dehydrated"        VITAL SIGNS   Initial Vitals [04/21/25 1527]   BP Pulse Resp Temp SpO2   (!) 149/84 104 18 98.4 °F (36.9 °C) 95 %      MAP       --            ALLERGIES    Review of patient's allergies indicates:   Allergen Reactions    Pcn [penicillins] Hives    Promethazine hcl Other (See Comments)     Gets muscle spasams    Reglan [metoclopramide hcl] Other (See Comments)     Gets muscle spasams       PROVIDER TRIAGE NOTE  This is a teletriage evaluation of a 79 y.o. female presenting to the ED complaining of vomiting. Patient reports vomiting, abdominal pain, and feeling dehydrated for about one month.    Patient is alert and oriented. She speaks in complete sentences. She is sitting upright in the chair in no distress.     Initial orders will be placed and care will be transferred to an alternate provider when patient is roomed for a full evaluation. Any additional orders and the final disposition will be determined by that provider.         ORDERS  Labs Reviewed   CBC W/ AUTO DIFFERENTIAL    Narrative:     The following orders were created for panel order CBC W/ AUTO DIFFERENTIAL.  Procedure                               Abnormality         Status                     ---------                               -----------         ------                     CBC with Differential[[id number]]                                                        Please view results for these tests on the individual orders.   COMPREHENSIVE METABOLIC PANEL   LIPASE   URINALYSIS, REFLEX TO URINE CULTURE   CBC WITH DIFFERENTIAL       ED Orders (720h ago, onward)      Start Ordered     Status Ordering Provider    04/21/25 1534 04/21/25 1533  Vital signs  Every 2 hours         Ordered JOVANNI DE SANTIAGO    04/21/25 " 1533 04/21/25 1533  Diet NPO  Diet effective now         Ordered JONNATHANJOVANNI G.    04/21/25 1533 04/21/25 1533  Insert peripheral IV  Once         Ordered JONNATHAN, JOVANNI G.    04/21/25 1533 04/21/25 1533  CBC W/ AUTO DIFFERENTIAL  STAT         Ordered JONNATHAN, JOVANNI G.    04/21/25 1533 04/21/25 1533  Comp. Metabolic Panel  STAT         Ordered JONNATHAN, JOVANNI G.    04/21/25 1533 04/21/25 1533  Lipase  STAT         Ordered JONNATHAN, JOVANNI G.    04/21/25 1533 04/21/25 1533  Urinalysis, Reflex to Urine Culture  STAT         Ordered JONNATHAN, JOVANNI G.    04/21/25 1533 04/21/25 1533  CBC with Differential  PROCEDURE ONCE         Ordered JONNATHAN JOVANNI G.              Virtual Visit Note: The provider triage portion of this emergency department evaluation and documentation was performed via [employer], a HIPAA-compliant telemedicine application, in concert with a tele-presenter in the room. A face to face patient evaluation with one of my colleagues will occur once the patient is placed in an emergency department room.      DISCLAIMER: This note was prepared with [employer] voice recognition transcription software. Garbled syntax, mangled pronouns, and other bizarre constructions may be attributed to that software system.

## 2025-04-21 NOTE — PROGRESS NOTES
"Subjective:      Patient ID: Amanda Pereira is a 79 y.o. female.    Vitals:  height is 5' 2" (1.575 m) and weight is 65.8 kg (145 lb). Her oral temperature is 99.4 °F (37.4 °C). Her blood pressure is 125/79 and her pulse is 103. Her respiration is 20 and oxygen saturation is 97%.     Chief Complaint: Emesis    Seen in er 4/6/25 for same complaint.  She continues to have cyclical vomiting and dizziness.  She is unable to specify number of episodes in past 24 hrs.  Has chronic bowel issues such as constipation and diarrhea.  She also has transient abd pain.  Was rx zofran and phenergan at er visit and states medications dont work and phenergan makes her restless.      Emesis   This is a new problem. The current episode started more than 1 month ago. The problem occurs intermittently. The problem has been gradually worsening. There has been no fever. Associated symptoms include abdominal pain, diarrhea and dizziness. Pertinent negatives include no fever.       Constitution: Negative for fever.   HENT: Negative.     Neck: neck negative.   Cardiovascular:  Negative for palpitations.   Eyes: Negative.    Gastrointestinal:  Positive for abdominal pain, nausea, vomiting, constipation and diarrhea.   Genitourinary: Negative.    Musculoskeletal: Negative.    Allergic/Immunologic: Positive for chronic cough.   Neurological:  Positive for dizziness.      Objective:     Physical Exam   Constitutional: She is oriented to person, place, and time. She is cooperative.   HENT:   Head: Normocephalic and atraumatic.   Ears:   Right Ear: External ear normal.   Left Ear: External ear normal.   Nose: Nose normal.   Mouth/Throat: Uvula is midline and oropharynx is clear and moist. Mucous membranes are dry. Oropharynx is clear.   Eyes: Conjunctivae and lids are normal. Pupils are equal, round, and reactive to light. Extraocular movement intact   Neck: Trachea normal and phonation normal. Neck supple.   Cardiovascular: Normal rate, regular " rhythm, normal heart sounds and normal pulses.   Pulmonary/Chest: Effort normal and breath sounds normal. No stridor. She has no wheezes. She has no rhonchi. She has no rales.         Comments: On supplemental o2 via cannula     Abdominal: Normal appearance. Soft. flat abdomen There is no abdominal tenderness.   Musculoskeletal: Normal range of motion.         General: Normal range of motion.   Neurological: no focal deficit. She is alert, oriented to person, place, and time and at baseline. She has normal motor skills, normal sensation and intact cranial nerves (2-12). Gait and coordination normal. GCS eye subscore is 4. GCS verbal subscore is 5. GCS motor subscore is 6.   Skin: Skin is dry. Capillary refill takes 2 to 3 seconds.   Psychiatric: She experiences Normal attention and Normal perception. Her speech is normal and behavior is normal. Mood, judgment and thought content normal.   Nursing note and vitals reviewed.      Assessment:     1. Nausea and vomiting, unspecified vomiting type    2. Dizziness        Plan:       Nausea and vomiting, unspecified vomiting type    Dizziness          Medical Decision Making:   History:   Old Medical Records: I decided to obtain old medical records.  Old Records Summarized: records from clinic visits.  Initial Assessment:   C/o cyclic vomiting, constipation, diarrhea, and dizziness.  Pt concerned of dehydration. She was educated on services available in UC setting. Given her intractable vomiting and dizziness with concerns of dehydration referred to ed for further eval to rule out electrolyte abnormality, kidney injury, or cardiac event causing dizziness.  Pt unreceptive of er eval or GI eval.  Release signed and phillip notified.  Differential Diagnosis:   Cyclic vomiting syndrome, dehydration, electrolyte derangement, dka  Urgent Care Management:  Workup for dizziness to include orthostatic, EKG, glucose politely declined

## 2025-04-22 VITALS
RESPIRATION RATE: 16 BRPM | HEART RATE: 89 BPM | OXYGEN SATURATION: 93 % | DIASTOLIC BLOOD PRESSURE: 67 MMHG | HEIGHT: 62 IN | TEMPERATURE: 98 F | BODY MASS INDEX: 26.68 KG/M2 | SYSTOLIC BLOOD PRESSURE: 139 MMHG | WEIGHT: 145 LBS

## 2025-04-22 LAB
OHS QRS DURATION: 72 MS
OHS QTC CALCULATION: 441 MS

## 2025-04-22 PROCEDURE — 25000003 PHARM REV CODE 250: Performed by: EMERGENCY MEDICINE

## 2025-04-22 PROCEDURE — 96361 HYDRATE IV INFUSION ADD-ON: CPT

## 2025-04-22 RX ORDER — PANTOPRAZOLE SODIUM 40 MG/1
40 TABLET, DELAYED RELEASE ORAL DAILY
Qty: 30 TABLET | Refills: 0 | Status: SHIPPED | OUTPATIENT
Start: 2025-04-22 | End: 2025-05-22

## 2025-04-22 RX ORDER — PROCHLORPERAZINE EDISYLATE 5 MG/ML
5 INJECTION INTRAMUSCULAR; INTRAVENOUS
Status: DISCONTINUED | OUTPATIENT
Start: 2025-04-22 | End: 2025-04-22 | Stop reason: HOSPADM

## 2025-04-22 RX ORDER — FLUTICASONE PROPIONATE 50 MCG
1 SPRAY, SUSPENSION (ML) NASAL DAILY PRN
Qty: 15 G | Refills: 0 | Status: SHIPPED | OUTPATIENT
Start: 2025-04-22

## 2025-04-22 RX ORDER — PROCHLORPERAZINE MALEATE 5 MG
5 TABLET ORAL EVERY 6 HOURS PRN
Qty: 12 TABLET | Refills: 0 | Status: SHIPPED | OUTPATIENT
Start: 2025-04-22

## 2025-04-22 RX ORDER — DICYCLOMINE HYDROCHLORIDE 10 MG/1
10 CAPSULE ORAL 3 TIMES DAILY PRN
Qty: 12 CAPSULE | Refills: 0 | Status: SHIPPED | OUTPATIENT
Start: 2025-04-22

## 2025-04-22 RX ORDER — DIPHENHYDRAMINE HYDROCHLORIDE 50 MG/ML
6.25 INJECTION, SOLUTION INTRAMUSCULAR; INTRAVENOUS
Status: DISCONTINUED | OUTPATIENT
Start: 2025-04-22 | End: 2025-04-22 | Stop reason: HOSPADM

## 2025-04-22 RX ORDER — SODIUM CHLORIDE, SODIUM LACTATE, POTASSIUM CHLORIDE, CALCIUM CHLORIDE 600; 310; 30; 20 MG/100ML; MG/100ML; MG/100ML; MG/100ML
1000 INJECTION, SOLUTION INTRAVENOUS
Status: DISCONTINUED | OUTPATIENT
Start: 2025-04-22 | End: 2025-04-22 | Stop reason: HOSPADM

## 2025-04-22 RX ORDER — AZITHROMYCIN 250 MG/1
TABLET, FILM COATED ORAL
Qty: 6 TABLET | Refills: 0 | Status: SHIPPED | OUTPATIENT
Start: 2025-04-22 | End: 2025-04-27

## 2025-04-22 RX ORDER — ACETAMINOPHEN 500 MG
1000 TABLET ORAL
Status: COMPLETED | OUTPATIENT
Start: 2025-04-22 | End: 2025-04-22

## 2025-04-22 RX ORDER — ONDANSETRON 4 MG/1
4 TABLET, ORALLY DISINTEGRATING ORAL EVERY 6 HOURS PRN
Qty: 1 TABLET | Refills: 0 | Status: SHIPPED | OUTPATIENT
Start: 2025-04-22

## 2025-04-22 RX ADMIN — ACETAMINOPHEN 1000 MG: 500 TABLET ORAL at 03:04

## 2025-04-22 NOTE — DISCHARGE INSTRUCTIONS
Please consider discontinuing the CBD gummies as this can sometimes lead to cyclic vomiting/cannabis hyperemesis.  This could potentially be contributing to your symptoms.  Recommend close follow up with your primary care physician and also gastroenterologist.  You may return to the emergency room for any acutely worsening symptoms or concerns.

## 2025-04-22 NOTE — ED NOTES
"Patient stated to RN that she would not be going home because she has a horrible headache, nauseated, and feels dizzy. Patient stated, "You can find me a place outside but I am not leaving." Notified Dr. Kim.      0401: Dr. Kim presented to bedside to discuss situation with patient  "

## 2025-04-28 LAB
OHS QRS DURATION: 72 MS
OHS QTC CALCULATION: 441 MS

## 2025-06-23 ENCOUNTER — OFFICE VISIT (OUTPATIENT)
Dept: PULMONOLOGY | Facility: CLINIC | Age: 80
End: 2025-06-23
Payer: MEDICARE

## 2025-06-23 VITALS
OXYGEN SATURATION: 93 % | HEART RATE: 102 BPM | HEIGHT: 62 IN | DIASTOLIC BLOOD PRESSURE: 72 MMHG | SYSTOLIC BLOOD PRESSURE: 140 MMHG | BODY MASS INDEX: 25.51 KG/M2 | WEIGHT: 138.63 LBS

## 2025-06-23 DIAGNOSIS — R05.3 CHRONIC COUGH: ICD-10-CM

## 2025-06-23 DIAGNOSIS — J96.11 CHRONIC RESPIRATORY FAILURE WITH HYPOXIA: ICD-10-CM

## 2025-06-23 DIAGNOSIS — R05.9 COUGH, UNSPECIFIED TYPE: Primary | ICD-10-CM

## 2025-06-23 PROCEDURE — 1126F AMNT PAIN NOTED NONE PRSNT: CPT | Mod: CPTII,S$GLB,, | Performed by: INTERNAL MEDICINE

## 2025-06-23 PROCEDURE — 99214 OFFICE O/P EST MOD 30 MIN: CPT | Mod: S$GLB,,, | Performed by: INTERNAL MEDICINE

## 2025-06-23 PROCEDURE — 3288F FALL RISK ASSESSMENT DOCD: CPT | Mod: CPTII,S$GLB,, | Performed by: INTERNAL MEDICINE

## 2025-06-23 PROCEDURE — 1159F MED LIST DOCD IN RCRD: CPT | Mod: CPTII,S$GLB,, | Performed by: INTERNAL MEDICINE

## 2025-06-23 PROCEDURE — 1101F PT FALLS ASSESS-DOCD LE1/YR: CPT | Mod: CPTII,S$GLB,, | Performed by: INTERNAL MEDICINE

## 2025-06-23 PROCEDURE — 3077F SYST BP >= 140 MM HG: CPT | Mod: CPTII,S$GLB,, | Performed by: INTERNAL MEDICINE

## 2025-06-23 PROCEDURE — 3078F DIAST BP <80 MM HG: CPT | Mod: CPTII,S$GLB,, | Performed by: INTERNAL MEDICINE

## 2025-06-23 PROCEDURE — 99999 PR PBB SHADOW E&M-EST. PATIENT-LVL III: CPT | Mod: PBBFAC,,, | Performed by: INTERNAL MEDICINE

## 2025-06-23 RX ORDER — MIRTAZAPINE 15 MG/1
15 TABLET, FILM COATED ORAL NIGHTLY
COMMUNITY
Start: 2025-05-21

## 2025-06-23 NOTE — PROGRESS NOTES
SUBJECTIVE:    Patient ID: Amanda Pereira is a 79 y.o. female.    Chief Complaint: Follow-up (Follow up SOB on exertion)    Follow-up     The patient is here feeling terrible, she states.  However, she is not using her oxygen, and she states it is in the car.  States her breathing is good in cool weather but more difficult in summer heat.  She also has a concentrator which she is not using either. She is on no Respiratory treatments.  Her most recent CT showed a marked improvement in the infiltrates she had after COVID.  Past Medical History:   Diagnosis Date    Depression     Diabetes mellitus     borderline    Kidney stone      Past Surgical History:   Procedure Laterality Date    CYSTOSCOPY N/A 9/21/2022    Procedure: CYSTOSCOPY;  Surgeon: Pepper Salas MD;  Location: [facility] OR;  Service: Urology;  Laterality: N/A;    EXTRACORPOREAL SHOCK WAVE LITHOTRIPSY Left 9/21/2022    Procedure: LITHOTRIPSY, ESWL;  Surgeon: Pepper Salas MD;  Location: [facility] OR;  Service: Urology;  Laterality: Left;    RETROGRADE PYELOGRAPHY Left 9/21/2022    Procedure: PYELOGRAM, RETROGRADE;  Surgeon: Pepper Salas MD;  Location: [facility] OR;  Service: Urology;  Laterality: Left;    TONSILLECTOMY, ADENOIDECTOMY      TUBAL LIGATION      WISDOM TOOTH EXTRACTION       Family History   Problem Relation Name Age of Onset    Coronary artery disease Father      Urolithiasis Mother      Diabetes Maternal Grandmother      Thyroid disease Sister      Prostate cancer Neg Hx      Kidney cancer Neg Hx          Social History:   Marital Status:   Occupation: Data Unavailable  Alcohol History:  reports no history of alcohol use.  Tobacco History:  reports that she has never smoked. She has never used smokeless tobacco.  Drug History:  reports no history of drug use.    Review of patient's allergies indicates:   Allergen Reactions    Pcn [penicillins] Hives    Promethazine hcl Other (See Comments)     Gets muscle spasams     Reglan [metoclopramide hcl] Other (See Comments)     Gets muscle spasams       Current Outpatient Medications   Medication Sig Dispense Refill    EScitalopram oxalate (LEXAPRO) 20 MG tablet Take 20 mg by mouth every morning.      fluticasone propionate (FLONASE) 50 mcg/actuation nasal spray 1 spray (50 mcg total) by Each Nostril route daily as needed for Rhinitis or Allergies. 15 g 0    lamoTRIgine (LAMICTAL) 150 MG Tab Take 150 mg by mouth 2 (two) times daily.      mirtazapine (REMERON) 15 MG tablet Take 15 mg by mouth every evening.      ondansetron (ZOFRAN-ODT) 4 MG TbDL Take 1 tablet (4 mg total) by mouth every 6 (six) hours as needed (Nausea). 1 tablet 0    pantoprazole (PROTONIX) 40 MG tablet Take 1 tablet (40 mg total) by mouth once daily. 30 tablet 0    traZODone (DESYREL) 150 MG tablet Take 150 mg by mouth every evening.      albuterol (PROVENTIL HFA) 90 mcg/actuation inhaler Inhale 2 puffs into the lungs every 4 (four) hours as needed for Wheezing or Shortness of Breath. Rescue 6.7 g 1    clonazePAM (KLONOPIN) 2 MG Tab Take 1-2 mg by mouth every evening. (Patient not taking: Reported on 6/23/2025)      dicyclomine (BENTYL) 10 MG capsule Take 1 capsule (10 mg total) by mouth 3 (three) times daily as needed (abdominal cramping). (Patient not taking: Reported on 6/23/2025) 12 capsule 0    doxepin (SINEQUAN) 25 MG capsule Take 25 mg by mouth every evening. (Patient not taking: Reported on 6/23/2025)      loperamide HCl (IMODIUM A-D ORAL) Take 2 mg by mouth daily as needed (Diarrhea). (Patient not taking: Reported on 6/23/2025)      methylPREDNISolone (MEDROL DOSEPACK) 4 mg tablet SMARTSIG:By Mouth As Directed (Patient not taking: Reported on 6/23/2025)      prochlorperazine (COMPAZINE) 5 MG tablet Take 1 tablet (5 mg total) by mouth every 6 (six) hours as needed (nausea). (Patient not taking: Reported on 6/23/2025) 12 tablet 0    promethazine-dextromethorphan (PROMETHAZINE-DM) 6.25-15 mg/5 mL Syrp Take 5 mLs  "by mouth every 6 (six) hours as needed. (Patient not taking: Reported on 6/23/2025)       No current facility-administered medications for this visit.       Alpha-1 Antitrypsin:  Last PFT:   Last CT:    Review of Systems  General: Feeling terrible.  Eyes: Vision is ok   ENT:  Rhinitis and sinus headaches  Heart:: palpitations.  Lungs: Breathing is much better. She is coughing a lot in the mornings and evenings and producing white mucus.  GI: No Nausea, vomiting, constipation, diarrhea, or reflux.  : Nocturia x 1  Musculoskeletal: back hurts from bending over  Skin: No lesions or rashes.  Neuro: sinus headaches  Lymph: No edema or adenopathy.  Psych: depression from being in her house so long.  Endo: lost about 11 pounds when sick with nausea    OBJECTIVE:      BP (!) 140/72   Pulse 102   Ht 5' 2" (1.575 m)   Wt 62.9 kg (138 lb 9.6 oz)   LMP  (LMP Unknown)   SpO2 (!) 93%   BMI 25.35 kg/m²   On room air  Physical Exam  GENERAL: Older patient in no distress.  HEENT: Pupils equal and reactive. Extraocular movements intact. Nose intact. Pharynx moist.  Lips are very dry.  NECK: Supple.   HEART: Regular rate and rhythm. No murmur or gallop auscultated.  LUNGS:  She has clear lung fields.. Lung excursion symmetrical. No change in fremitus.  ABDOMEN: Bowel sounds present. Non-tender, no masses palpated.  EXTREMITIES: Normal muscle tone and joint movement, no cyanosis or clubbing.   LYMPHATICS: No adenopathy palpated, no edema.  SKIN: Very dry, intact, no lesions.   NEURO: Cranial nerves II-XII intact. Motor strength 5/5 bilaterally, upper and lower extremities.  PSYCH: Appropriate affect.    Assessment:       1. Cough, unspecified type    2. Chronic respiratory failure with hypoxia    3. Chronic cough        The patient has been oxygen since her bout of covid  However, it appears her lungs have cleared up as she is oxygenating adequately on room air at this time.  She does not perceive the for the oxygen any more.  " Will check overnight pulse ox and a 6 minutes walk.  As she is walking out the door, she reports that she has been coughing excessively in morning and the evening and  has been forever and is producing  white mucus. Ordered PFT s as well.          Plan:       Cough, unspecified type  -     Complete PFT with bronchodilator; Future    Chronic respiratory failure with hypoxia  -     Stress test, pulmonary; Future; Expected date: 06/23/2025  -     [facility] - OTHER    Chronic cough               Will call patient with results  Follow up if symptoms worsen or fail to improve.

## 2025-07-15 ENCOUNTER — TELEPHONE (OUTPATIENT)
Dept: PULMONOLOGY | Facility: CLINIC | Age: 80
End: 2025-07-15
Payer: MEDICARE

## 2025-07-15 ENCOUNTER — PATIENT MESSAGE (OUTPATIENT)
Dept: PULMONOLOGY | Facility: CLINIC | Age: 80
End: 2025-07-15
Payer: MEDICARE

## 2025-07-15 NOTE — TELEPHONE ENCOUNTER
Jj can not reach her to set up overnight pulse ox. I left a message for MsLuma Randall to contact Jj herself to schedule testing.

## (undated) DEVICE — CATH POLLACK OPEN-END FLEXI-TI

## (undated) DEVICE — GLOVE SURG ULTRA TOUCH 6

## (undated) DEVICE — JELLY SURGILUBE LUBE TUBE 2OZ

## (undated) DEVICE — SCRUB DYNA-HEX LIQ 4% CHG 4OZ

## (undated) DEVICE — SYS LABEL CORRECT MED

## (undated) DEVICE — CONTAINER SPECIMEN OR STER 4OZ

## (undated) DEVICE — SET IRR URLGY 2LINE UNIV SPIKE

## (undated) DEVICE — GLOVE SURGEONS ULTRA TOUCH 6.5

## (undated) DEVICE — COVER TABLE 44X90 STERILE

## (undated) DEVICE — GOWN POLY REINF BRTH SLV XL

## (undated) DEVICE — SPONGE BULKEE II ABSRB 6X6.75

## (undated) DEVICE — GOWN POLY REINF BRTH SLV LG

## (undated) DEVICE — SYR LUER LOCK STERILE 10ML

## (undated) DEVICE — DRAPE MINOR FEN 98X77X121IN

## (undated) DEVICE — SOL IRR NACL .9% 3000ML

## (undated) DEVICE — GUIDE WIRE MOTION .035 X 150CM

## (undated) DEVICE — SOL WATER STRL IRR 1000ML

## (undated) DEVICE — BOWL STERILE LARGE 32OZ

## (undated) DEVICE — SLEEVE SCD EXPRESS KNEE MEDIUM

## (undated) DEVICE — CATH URETHRAL 16FR RED